# Patient Record
Sex: FEMALE | Race: WHITE | NOT HISPANIC OR LATINO
[De-identification: names, ages, dates, MRNs, and addresses within clinical notes are randomized per-mention and may not be internally consistent; named-entity substitution may affect disease eponyms.]

---

## 2017-01-25 ENCOUNTER — APPOINTMENT (OUTPATIENT)
Dept: PAIN MANAGEMENT | Facility: CLINIC | Age: 32
End: 2017-01-25

## 2017-01-25 VITALS
SYSTOLIC BLOOD PRESSURE: 108 MMHG | DIASTOLIC BLOOD PRESSURE: 60 MMHG | HEIGHT: 60 IN | WEIGHT: 155 LBS | HEART RATE: 73 BPM | BODY MASS INDEX: 30.43 KG/M2

## 2017-05-04 ENCOUNTER — APPOINTMENT (OUTPATIENT)
Dept: PAIN MANAGEMENT | Facility: CLINIC | Age: 32
End: 2017-05-04

## 2017-05-04 VITALS
HEART RATE: 78 BPM | WEIGHT: 155 LBS | BODY MASS INDEX: 30.43 KG/M2 | HEIGHT: 60 IN | SYSTOLIC BLOOD PRESSURE: 116 MMHG | DIASTOLIC BLOOD PRESSURE: 77 MMHG

## 2017-06-19 ENCOUNTER — APPOINTMENT (OUTPATIENT)
Dept: PAIN MANAGEMENT | Facility: CLINIC | Age: 32
End: 2017-06-19

## 2017-08-10 ENCOUNTER — APPOINTMENT (OUTPATIENT)
Dept: PAIN MANAGEMENT | Facility: CLINIC | Age: 32
End: 2017-08-10
Payer: COMMERCIAL

## 2017-08-10 VITALS
DIASTOLIC BLOOD PRESSURE: 71 MMHG | HEIGHT: 60 IN | WEIGHT: 146 LBS | SYSTOLIC BLOOD PRESSURE: 112 MMHG | HEART RATE: 73 BPM | BODY MASS INDEX: 28.66 KG/M2

## 2017-08-10 PROCEDURE — 99214 OFFICE O/P EST MOD 30 MIN: CPT

## 2017-08-16 ENCOUNTER — MESSAGE (OUTPATIENT)
Age: 32
End: 2017-08-16

## 2017-09-14 ENCOUNTER — APPOINTMENT (OUTPATIENT)
Dept: PAIN MANAGEMENT | Facility: CLINIC | Age: 32
End: 2017-09-14

## 2017-11-09 ENCOUNTER — APPOINTMENT (OUTPATIENT)
Dept: PAIN MANAGEMENT | Facility: CLINIC | Age: 32
End: 2017-11-09

## 2018-02-07 ENCOUNTER — MEDICATION RENEWAL (OUTPATIENT)
Age: 33
End: 2018-02-07

## 2018-02-12 ENCOUNTER — APPOINTMENT (OUTPATIENT)
Dept: PAIN MANAGEMENT | Facility: CLINIC | Age: 33
End: 2018-02-12

## 2018-02-14 ENCOUNTER — MEDICATION RENEWAL (OUTPATIENT)
Age: 33
End: 2018-02-14

## 2018-04-04 ENCOUNTER — MESSAGE (OUTPATIENT)
Age: 33
End: 2018-04-04

## 2018-04-09 ENCOUNTER — MESSAGE (OUTPATIENT)
Age: 33
End: 2018-04-09

## 2018-05-23 ENCOUNTER — APPOINTMENT (OUTPATIENT)
Dept: PAIN MANAGEMENT | Facility: CLINIC | Age: 33
End: 2018-05-23
Payer: COMMERCIAL

## 2018-05-23 VITALS
DIASTOLIC BLOOD PRESSURE: 75 MMHG | SYSTOLIC BLOOD PRESSURE: 118 MMHG | BODY MASS INDEX: 27.88 KG/M2 | WEIGHT: 142 LBS | HEART RATE: 77 BPM | HEIGHT: 60 IN

## 2018-05-23 PROCEDURE — 99213 OFFICE O/P EST LOW 20 MIN: CPT

## 2018-06-11 ENCOUNTER — MESSAGE (OUTPATIENT)
Age: 33
End: 2018-06-11

## 2018-11-13 ENCOUNTER — TRANSCRIPTION ENCOUNTER (OUTPATIENT)
Age: 33
End: 2018-11-13

## 2018-11-19 ENCOUNTER — APPOINTMENT (OUTPATIENT)
Dept: PAIN MANAGEMENT | Facility: CLINIC | Age: 33
End: 2018-11-19

## 2018-12-21 ENCOUNTER — TRANSCRIPTION ENCOUNTER (OUTPATIENT)
Age: 33
End: 2018-12-21

## 2019-01-03 ENCOUNTER — APPOINTMENT (OUTPATIENT)
Dept: PAIN MANAGEMENT | Facility: CLINIC | Age: 34
End: 2019-01-03
Payer: COMMERCIAL

## 2019-01-03 VITALS
WEIGHT: 130 LBS | SYSTOLIC BLOOD PRESSURE: 123 MMHG | HEART RATE: 64 BPM | HEIGHT: 60 IN | BODY MASS INDEX: 25.52 KG/M2 | DIASTOLIC BLOOD PRESSURE: 79 MMHG

## 2019-01-03 PROCEDURE — 99214 OFFICE O/P EST MOD 30 MIN: CPT

## 2019-04-10 ENCOUNTER — APPOINTMENT (OUTPATIENT)
Dept: PAIN MANAGEMENT | Facility: CLINIC | Age: 34
End: 2019-04-10

## 2019-10-28 ENCOUNTER — RECORD ABSTRACTING (OUTPATIENT)
Age: 34
End: 2019-10-28

## 2019-10-28 DIAGNOSIS — O09.292 SUPERVISION OF PREGNANCY WITH OTHER POOR REPRODUCTIVE OR OBSTETRIC HISTORY, SECOND TRIMESTER: ICD-10-CM

## 2019-10-28 DIAGNOSIS — O09.299 SUPERVISION OF PREGNANCY WITH OTHER POOR REPRODUCTIVE OR OBSTETRIC HISTORY, UNSPECIFIED TRIMESTER: ICD-10-CM

## 2019-10-28 DIAGNOSIS — Z31.69 ENCOUNTER FOR OTHER GENERAL COUNSELING AND ADVICE ON PROCREATION: ICD-10-CM

## 2019-11-07 ENCOUNTER — APPOINTMENT (OUTPATIENT)
Dept: MATERNAL FETAL MEDICINE | Facility: CLINIC | Age: 34
End: 2019-11-07

## 2019-11-07 ENCOUNTER — APPOINTMENT (OUTPATIENT)
Dept: ANTEPARTUM | Facility: CLINIC | Age: 34
End: 2019-11-07

## 2019-11-07 ENCOUNTER — APPOINTMENT (OUTPATIENT)
Dept: OBGYN | Facility: CLINIC | Age: 34
End: 2019-11-07
Payer: COMMERCIAL

## 2019-11-07 PROCEDURE — 99203 OFFICE O/P NEW LOW 30 MIN: CPT

## 2019-12-08 ENCOUNTER — FORM ENCOUNTER (OUTPATIENT)
Age: 34
End: 2019-12-08

## 2019-12-09 ENCOUNTER — RESULT REVIEW (OUTPATIENT)
Age: 34
End: 2019-12-09

## 2019-12-09 ENCOUNTER — APPOINTMENT (OUTPATIENT)
Dept: OBGYN | Facility: CLINIC | Age: 34
End: 2019-12-09
Payer: COMMERCIAL

## 2019-12-09 PROCEDURE — 99395 PREV VISIT EST AGE 18-39: CPT

## 2020-01-03 ENCOUNTER — FORM ENCOUNTER (OUTPATIENT)
Age: 35
End: 2020-01-03

## 2020-01-13 ENCOUNTER — APPOINTMENT (OUTPATIENT)
Dept: OBGYN | Facility: CLINIC | Age: 35
End: 2020-01-13
Payer: COMMERCIAL

## 2020-01-13 PROCEDURE — 99214 OFFICE O/P EST MOD 30 MIN: CPT | Mod: 25

## 2020-01-13 PROCEDURE — 76817 TRANSVAGINAL US OBSTETRIC: CPT

## 2020-01-13 PROCEDURE — 36415 COLL VENOUS BLD VENIPUNCTURE: CPT

## 2020-01-29 ENCOUNTER — FORM ENCOUNTER (OUTPATIENT)
Age: 35
End: 2020-01-29

## 2020-01-30 ENCOUNTER — APPOINTMENT (OUTPATIENT)
Dept: OBGYN | Facility: CLINIC | Age: 35
End: 2020-01-30
Payer: COMMERCIAL

## 2020-01-30 PROCEDURE — 76817 TRANSVAGINAL US OBSTETRIC: CPT

## 2020-02-02 ENCOUNTER — FORM ENCOUNTER (OUTPATIENT)
Age: 35
End: 2020-02-02

## 2020-02-12 ENCOUNTER — FORM ENCOUNTER (OUTPATIENT)
Age: 35
End: 2020-02-12

## 2020-02-13 ENCOUNTER — APPOINTMENT (OUTPATIENT)
Dept: OBGYN | Facility: CLINIC | Age: 35
End: 2020-02-13
Payer: COMMERCIAL

## 2020-02-13 PROCEDURE — 0502F SUBSEQUENT PRENATAL CARE: CPT

## 2020-02-13 PROCEDURE — 36415 COLL VENOUS BLD VENIPUNCTURE: CPT

## 2020-02-13 PROCEDURE — 76801 OB US < 14 WKS SINGLE FETUS: CPT | Mod: 59

## 2020-02-13 PROCEDURE — 76813 OB US NUCHAL MEAS 1 GEST: CPT

## 2020-02-21 ENCOUNTER — FORM ENCOUNTER (OUTPATIENT)
Age: 35
End: 2020-02-21

## 2020-02-24 ENCOUNTER — FORM ENCOUNTER (OUTPATIENT)
Age: 35
End: 2020-02-24

## 2020-02-25 ENCOUNTER — FORM ENCOUNTER (OUTPATIENT)
Age: 35
End: 2020-02-25

## 2020-02-26 ENCOUNTER — FORM ENCOUNTER (OUTPATIENT)
Age: 35
End: 2020-02-26

## 2020-03-02 ENCOUNTER — APPOINTMENT (OUTPATIENT)
Dept: OBGYN | Facility: CLINIC | Age: 35
End: 2020-03-02
Payer: COMMERCIAL

## 2020-03-02 PROCEDURE — 0502F SUBSEQUENT PRENATAL CARE: CPT

## 2020-03-05 ENCOUNTER — TRANSCRIPTION ENCOUNTER (OUTPATIENT)
Age: 35
End: 2020-03-05

## 2020-03-06 ENCOUNTER — OUTPATIENT (OUTPATIENT)
Dept: OUTPATIENT SERVICES | Facility: HOSPITAL | Age: 35
LOS: 1 days | Discharge: HOME | End: 2020-03-06
Payer: COMMERCIAL

## 2020-03-06 VITALS — HEIGHT: 60 IN | WEIGHT: 147.27 LBS

## 2020-03-06 VITALS
RESPIRATION RATE: 15 BRPM | DIASTOLIC BLOOD PRESSURE: 70 MMHG | TEMPERATURE: 99 F | OXYGEN SATURATION: 98 % | HEART RATE: 70 BPM | SYSTOLIC BLOOD PRESSURE: 116 MMHG

## 2020-03-06 DIAGNOSIS — O34.30 MATERNAL CARE FOR CERVICAL INCOMPETENCE, UNSPECIFIED TRIMESTER: ICD-10-CM

## 2020-03-06 LAB
BASOPHILS # BLD AUTO: 0.02 K/UL — SIGNIFICANT CHANGE UP (ref 0–0.2)
BASOPHILS NFR BLD AUTO: 0.3 % — SIGNIFICANT CHANGE UP (ref 0–2)
BLD GP AB SCN SERPL QL: NEGATIVE — SIGNIFICANT CHANGE UP
EOSINOPHIL # BLD AUTO: 0.16 K/UL — SIGNIFICANT CHANGE UP (ref 0–0.5)
EOSINOPHIL NFR BLD AUTO: 2.5 % — SIGNIFICANT CHANGE UP (ref 0–6)
HCT VFR BLD CALC: 31.7 % — LOW (ref 34.5–45)
HGB BLD-MCNC: 10.7 G/DL — LOW (ref 11.5–15.5)
IMM GRANULOCYTES NFR BLD AUTO: 0.6 % — SIGNIFICANT CHANGE UP (ref 0–1.5)
LYMPHOCYTES # BLD AUTO: 1.76 K/UL — SIGNIFICANT CHANGE UP (ref 1–3.3)
LYMPHOCYTES # BLD AUTO: 27.1 % — SIGNIFICANT CHANGE UP (ref 13–44)
MCHC RBC-ENTMCNC: 31.9 PG — SIGNIFICANT CHANGE UP (ref 27–34)
MCHC RBC-ENTMCNC: 33.8 GM/DL — SIGNIFICANT CHANGE UP (ref 32–36)
MCV RBC AUTO: 94.6 FL — SIGNIFICANT CHANGE UP (ref 80–100)
MONOCYTES # BLD AUTO: 0.45 K/UL — SIGNIFICANT CHANGE UP (ref 0–0.9)
MONOCYTES NFR BLD AUTO: 6.9 % — SIGNIFICANT CHANGE UP (ref 2–14)
NEUTROPHILS # BLD AUTO: 4.06 K/UL — SIGNIFICANT CHANGE UP (ref 1.8–7.4)
NEUTROPHILS NFR BLD AUTO: 62.6 % — SIGNIFICANT CHANGE UP (ref 43–77)
NRBC # BLD: 0 /100 WBCS — SIGNIFICANT CHANGE UP (ref 0–0)
PLATELET # BLD AUTO: 255 K/UL — SIGNIFICANT CHANGE UP (ref 150–400)
RBC # BLD: 3.35 M/UL — LOW (ref 3.8–5.2)
RBC # FLD: 14.1 % — SIGNIFICANT CHANGE UP (ref 10.3–14.5)
RH IG SCN BLD-IMP: POSITIVE — SIGNIFICANT CHANGE UP
WBC # BLD: 6.49 K/UL — SIGNIFICANT CHANGE UP (ref 3.8–10.5)
WBC # FLD AUTO: 6.49 K/UL — SIGNIFICANT CHANGE UP (ref 3.8–10.5)

## 2020-03-06 PROCEDURE — 86900 BLOOD TYPING SEROLOGIC ABO: CPT

## 2020-03-06 PROCEDURE — 85027 COMPLETE CBC AUTOMATED: CPT

## 2020-03-06 PROCEDURE — 59320 REVISION OF CERVIX: CPT

## 2020-03-06 PROCEDURE — 86901 BLOOD TYPING SEROLOGIC RH(D): CPT

## 2020-03-06 PROCEDURE — 59025 FETAL NON-STRESS TEST: CPT

## 2020-03-06 PROCEDURE — 86850 RBC ANTIBODY SCREEN: CPT

## 2020-03-06 RX ORDER — SODIUM CHLORIDE 9 MG/ML
1000 INJECTION, SOLUTION INTRAVENOUS
Refills: 0 | Status: DISCONTINUED | OUTPATIENT
Start: 2020-03-06 | End: 2020-03-06

## 2020-03-06 RX ORDER — INDOMETHACIN 50 MG
1 CAPSULE ORAL
Qty: 8 | Refills: 0
Start: 2020-03-06 | End: 2020-03-07

## 2020-03-06 RX ORDER — CITRIC ACID/SODIUM CITRATE 300-500 MG
15 SOLUTION, ORAL ORAL EVERY 6 HOURS
Refills: 0 | Status: DISCONTINUED | OUTPATIENT
Start: 2020-03-06 | End: 2020-03-06

## 2020-03-06 RX ORDER — INDOMETHACIN 50 MG
50 CAPSULE ORAL ONCE
Refills: 0 | Status: COMPLETED | OUTPATIENT
Start: 2020-03-06 | End: 2020-03-06

## 2020-03-06 RX ADMIN — Medication 50 MILLIGRAM(S): at 15:59

## 2020-03-09 ENCOUNTER — FORM ENCOUNTER (OUTPATIENT)
Age: 35
End: 2020-03-09

## 2020-03-12 ENCOUNTER — APPOINTMENT (OUTPATIENT)
Dept: OBGYN | Facility: CLINIC | Age: 35
End: 2020-03-12
Payer: COMMERCIAL

## 2020-03-12 PROCEDURE — 76815 OB US LIMITED FETUS(S): CPT | Mod: 59

## 2020-03-12 PROCEDURE — 36415 COLL VENOUS BLD VENIPUNCTURE: CPT

## 2020-03-12 PROCEDURE — 0502F SUBSEQUENT PRENATAL CARE: CPT

## 2020-03-12 PROCEDURE — 76817 TRANSVAGINAL US OBSTETRIC: CPT

## 2020-03-19 ENCOUNTER — APPOINTMENT (OUTPATIENT)
Dept: OBGYN | Facility: CLINIC | Age: 35
End: 2020-03-19
Payer: COMMERCIAL

## 2020-03-19 PROCEDURE — 0502F SUBSEQUENT PRENATAL CARE: CPT

## 2020-03-19 PROCEDURE — 96372 THER/PROPH/DIAG INJ SC/IM: CPT

## 2020-03-19 PROCEDURE — 76817 TRANSVAGINAL US OBSTETRIC: CPT

## 2020-03-19 PROCEDURE — 36415 COLL VENOUS BLD VENIPUNCTURE: CPT

## 2020-03-19 PROCEDURE — 76815 OB US LIMITED FETUS(S): CPT

## 2020-03-26 ENCOUNTER — APPOINTMENT (OUTPATIENT)
Dept: OBGYN | Facility: CLINIC | Age: 35
End: 2020-03-26
Payer: COMMERCIAL

## 2020-03-26 PROCEDURE — 96372 THER/PROPH/DIAG INJ SC/IM: CPT

## 2020-03-30 ENCOUNTER — APPOINTMENT (OUTPATIENT)
Dept: ANTEPARTUM | Facility: CLINIC | Age: 35
End: 2020-03-30
Payer: COMMERCIAL

## 2020-03-30 ENCOUNTER — ASOB RESULT (OUTPATIENT)
Age: 35
End: 2020-03-30

## 2020-03-30 PROCEDURE — 76817 TRANSVAGINAL US OBSTETRIC: CPT

## 2020-03-30 PROCEDURE — 76805 OB US >/= 14 WKS SNGL FETUS: CPT

## 2020-03-31 ENCOUNTER — ASOB RESULT (OUTPATIENT)
Age: 35
End: 2020-03-31

## 2020-04-02 ENCOUNTER — APPOINTMENT (OUTPATIENT)
Dept: OBGYN | Facility: CLINIC | Age: 35
End: 2020-04-02

## 2020-04-08 ENCOUNTER — APPOINTMENT (OUTPATIENT)
Dept: OBGYN | Facility: CLINIC | Age: 35
End: 2020-04-08
Payer: COMMERCIAL

## 2020-04-08 PROCEDURE — 96372 THER/PROPH/DIAG INJ SC/IM: CPT

## 2020-04-16 ENCOUNTER — APPOINTMENT (OUTPATIENT)
Dept: OBGYN | Facility: CLINIC | Age: 35
End: 2020-04-16

## 2020-04-17 ENCOUNTER — APPOINTMENT (OUTPATIENT)
Dept: OBGYN | Facility: CLINIC | Age: 35
End: 2020-04-17
Payer: COMMERCIAL

## 2020-04-17 PROCEDURE — 96372 THER/PROPH/DIAG INJ SC/IM: CPT

## 2020-04-17 PROCEDURE — 36415 COLL VENOUS BLD VENIPUNCTURE: CPT

## 2020-04-20 ENCOUNTER — ASOB RESULT (OUTPATIENT)
Age: 35
End: 2020-04-20

## 2020-04-20 ENCOUNTER — APPOINTMENT (OUTPATIENT)
Dept: ANTEPARTUM | Facility: CLINIC | Age: 35
End: 2020-04-20
Payer: COMMERCIAL

## 2020-04-20 PROCEDURE — 76811 OB US DETAILED SNGL FETUS: CPT

## 2020-04-23 ENCOUNTER — APPOINTMENT (OUTPATIENT)
Dept: OBGYN | Facility: CLINIC | Age: 35
End: 2020-04-23
Payer: COMMERCIAL

## 2020-04-23 PROCEDURE — 96372 THER/PROPH/DIAG INJ SC/IM: CPT

## 2020-04-30 ENCOUNTER — APPOINTMENT (OUTPATIENT)
Dept: OBGYN | Facility: CLINIC | Age: 35
End: 2020-04-30
Payer: COMMERCIAL

## 2020-04-30 PROCEDURE — 96372 THER/PROPH/DIAG INJ SC/IM: CPT

## 2020-05-04 ENCOUNTER — APPOINTMENT (OUTPATIENT)
Dept: OBGYN | Facility: CLINIC | Age: 35
End: 2020-05-04
Payer: COMMERCIAL

## 2020-05-04 PROCEDURE — 0502F SUBSEQUENT PRENATAL CARE: CPT

## 2020-05-07 ENCOUNTER — APPOINTMENT (OUTPATIENT)
Dept: OBGYN | Facility: CLINIC | Age: 35
End: 2020-05-07
Payer: COMMERCIAL

## 2020-05-07 PROCEDURE — 96372 THER/PROPH/DIAG INJ SC/IM: CPT

## 2020-05-14 ENCOUNTER — APPOINTMENT (OUTPATIENT)
Dept: OBGYN | Facility: CLINIC | Age: 35
End: 2020-05-14
Payer: COMMERCIAL

## 2020-05-14 PROCEDURE — 96372 THER/PROPH/DIAG INJ SC/IM: CPT

## 2020-05-14 PROCEDURE — 76816 OB US FOLLOW-UP PER FETUS: CPT

## 2020-05-14 PROCEDURE — 0502F SUBSEQUENT PRENATAL CARE: CPT

## 2020-05-14 PROCEDURE — 76817 TRANSVAGINAL US OBSTETRIC: CPT

## 2020-05-21 ENCOUNTER — APPOINTMENT (OUTPATIENT)
Dept: OBGYN | Facility: CLINIC | Age: 35
End: 2020-05-21
Payer: COMMERCIAL

## 2020-05-21 PROCEDURE — 96372 THER/PROPH/DIAG INJ SC/IM: CPT

## 2020-05-28 ENCOUNTER — FORM ENCOUNTER (OUTPATIENT)
Age: 35
End: 2020-05-28

## 2020-05-28 ENCOUNTER — APPOINTMENT (OUTPATIENT)
Dept: OBGYN | Facility: CLINIC | Age: 35
End: 2020-05-28
Payer: COMMERCIAL

## 2020-05-28 PROCEDURE — 36415 COLL VENOUS BLD VENIPUNCTURE: CPT

## 2020-05-28 PROCEDURE — 0502F SUBSEQUENT PRENATAL CARE: CPT

## 2020-05-28 PROCEDURE — 96372 THER/PROPH/DIAG INJ SC/IM: CPT

## 2020-06-04 ENCOUNTER — APPOINTMENT (OUTPATIENT)
Dept: OBGYN | Facility: CLINIC | Age: 35
End: 2020-06-04
Payer: COMMERCIAL

## 2020-06-04 PROCEDURE — 96372 THER/PROPH/DIAG INJ SC/IM: CPT

## 2020-06-11 ENCOUNTER — APPOINTMENT (OUTPATIENT)
Dept: OBGYN | Facility: CLINIC | Age: 35
End: 2020-06-11
Payer: COMMERCIAL

## 2020-06-11 PROCEDURE — 0502F SUBSEQUENT PRENATAL CARE: CPT

## 2020-06-11 PROCEDURE — 90471 IMMUNIZATION ADMIN: CPT

## 2020-06-11 PROCEDURE — 96372 THER/PROPH/DIAG INJ SC/IM: CPT

## 2020-06-11 PROCEDURE — 90715 TDAP VACCINE 7 YRS/> IM: CPT

## 2020-06-18 ENCOUNTER — APPOINTMENT (OUTPATIENT)
Dept: OBGYN | Facility: CLINIC | Age: 35
End: 2020-06-18
Payer: COMMERCIAL

## 2020-06-18 PROCEDURE — 36415 COLL VENOUS BLD VENIPUNCTURE: CPT

## 2020-06-18 PROCEDURE — 96372 THER/PROPH/DIAG INJ SC/IM: CPT

## 2020-06-24 ENCOUNTER — APPOINTMENT (OUTPATIENT)
Dept: OBGYN | Facility: CLINIC | Age: 35
End: 2020-06-24
Payer: COMMERCIAL

## 2020-06-24 PROCEDURE — 76819 FETAL BIOPHYS PROFIL W/O NST: CPT

## 2020-06-24 PROCEDURE — 0502F SUBSEQUENT PRENATAL CARE: CPT

## 2020-06-24 PROCEDURE — 76816 OB US FOLLOW-UP PER FETUS: CPT

## 2020-06-24 PROCEDURE — 96372 THER/PROPH/DIAG INJ SC/IM: CPT

## 2020-07-02 ENCOUNTER — APPOINTMENT (OUTPATIENT)
Dept: OBGYN | Facility: CLINIC | Age: 35
End: 2020-07-02
Payer: COMMERCIAL

## 2020-07-02 PROCEDURE — 96372 THER/PROPH/DIAG INJ SC/IM: CPT

## 2020-07-09 ENCOUNTER — APPOINTMENT (OUTPATIENT)
Dept: OBGYN | Facility: CLINIC | Age: 35
End: 2020-07-09
Payer: COMMERCIAL

## 2020-07-09 PROCEDURE — 0502F SUBSEQUENT PRENATAL CARE: CPT

## 2020-07-09 PROCEDURE — 96372 THER/PROPH/DIAG INJ SC/IM: CPT

## 2020-07-16 ENCOUNTER — APPOINTMENT (OUTPATIENT)
Dept: OBGYN | Facility: CLINIC | Age: 35
End: 2020-07-16
Payer: COMMERCIAL

## 2020-07-16 PROCEDURE — 96372 THER/PROPH/DIAG INJ SC/IM: CPT

## 2020-07-19 ENCOUNTER — OUTPATIENT (OUTPATIENT)
Dept: OUTPATIENT SERVICES | Facility: HOSPITAL | Age: 35
LOS: 1 days | End: 2020-07-19
Payer: COMMERCIAL

## 2020-07-19 DIAGNOSIS — O26.899 OTHER SPECIFIED PREGNANCY RELATED CONDITIONS, UNSPECIFIED TRIMESTER: ICD-10-CM

## 2020-07-19 DIAGNOSIS — Z3A.00 WEEKS OF GESTATION OF PREGNANCY NOT SPECIFIED: ICD-10-CM

## 2020-07-19 LAB
APPEARANCE UR: ABNORMAL
BILIRUB UR-MCNC: NEGATIVE — SIGNIFICANT CHANGE UP
COLOR SPEC: YELLOW — SIGNIFICANT CHANGE UP
DIFF PNL FLD: NEGATIVE — SIGNIFICANT CHANGE UP
GLUCOSE UR QL: NEGATIVE — SIGNIFICANT CHANGE UP
KETONES UR-MCNC: NEGATIVE — SIGNIFICANT CHANGE UP
LEUKOCYTE ESTERASE UR-ACNC: ABNORMAL
NITRITE UR-MCNC: NEGATIVE — SIGNIFICANT CHANGE UP
PH UR: 6.5 — SIGNIFICANT CHANGE UP (ref 5–8)
PROT UR-MCNC: ABNORMAL
SP GR SPEC: 1.02 — SIGNIFICANT CHANGE UP (ref 1.01–1.02)
UROBILINOGEN FLD QL: NEGATIVE — SIGNIFICANT CHANGE UP

## 2020-07-19 PROCEDURE — G0463: CPT

## 2020-07-19 PROCEDURE — 59025 FETAL NON-STRESS TEST: CPT

## 2020-07-19 PROCEDURE — 87086 URINE CULTURE/COLONY COUNT: CPT

## 2020-07-19 PROCEDURE — 59025 FETAL NON-STRESS TEST: CPT | Mod: 26

## 2020-07-19 PROCEDURE — 81001 URINALYSIS AUTO W/SCOPE: CPT

## 2020-07-19 RX ORDER — NITROFURANTOIN MACROCRYSTAL 50 MG
1 CAPSULE ORAL
Qty: 10 | Refills: 0
Start: 2020-07-19 | End: 2020-07-23

## 2020-07-21 ENCOUNTER — APPOINTMENT (OUTPATIENT)
Dept: OBGYN | Facility: CLINIC | Age: 35
End: 2020-07-21
Payer: COMMERCIAL

## 2020-07-21 LAB
CULTURE RESULTS: SIGNIFICANT CHANGE UP
SPECIMEN SOURCE: SIGNIFICANT CHANGE UP

## 2020-07-21 PROCEDURE — 76816 OB US FOLLOW-UP PER FETUS: CPT

## 2020-07-21 PROCEDURE — 0502F SUBSEQUENT PRENATAL CARE: CPT

## 2020-07-23 ENCOUNTER — APPOINTMENT (OUTPATIENT)
Dept: OBGYN | Facility: CLINIC | Age: 35
End: 2020-07-23
Payer: COMMERCIAL

## 2020-07-23 ENCOUNTER — FORM ENCOUNTER (OUTPATIENT)
Age: 35
End: 2020-07-23

## 2020-07-23 PROCEDURE — 96372 THER/PROPH/DIAG INJ SC/IM: CPT

## 2020-07-30 ENCOUNTER — APPOINTMENT (OUTPATIENT)
Dept: OBGYN | Facility: CLINIC | Age: 35
End: 2020-07-30
Payer: COMMERCIAL

## 2020-07-30 PROCEDURE — 96372 THER/PROPH/DIAG INJ SC/IM: CPT

## 2020-08-03 ENCOUNTER — APPOINTMENT (OUTPATIENT)
Dept: OBGYN | Facility: CLINIC | Age: 35
End: 2020-08-03
Payer: COMMERCIAL

## 2020-08-03 PROCEDURE — 96372 THER/PROPH/DIAG INJ SC/IM: CPT

## 2020-08-03 PROCEDURE — 0502F SUBSEQUENT PRENATAL CARE: CPT

## 2020-08-03 PROCEDURE — 36415 COLL VENOUS BLD VENIPUNCTURE: CPT

## 2020-08-06 ENCOUNTER — APPOINTMENT (OUTPATIENT)
Dept: OBGYN | Facility: CLINIC | Age: 35
End: 2020-08-06

## 2020-08-10 ENCOUNTER — APPOINTMENT (OUTPATIENT)
Dept: OBGYN | Facility: CLINIC | Age: 35
End: 2020-08-10
Payer: COMMERCIAL

## 2020-08-10 PROCEDURE — 0502F SUBSEQUENT PRENATAL CARE: CPT

## 2020-08-17 ENCOUNTER — APPOINTMENT (OUTPATIENT)
Dept: OBGYN | Facility: CLINIC | Age: 35
End: 2020-08-17
Payer: COMMERCIAL

## 2020-08-17 PROCEDURE — 0502F SUBSEQUENT PRENATAL CARE: CPT

## 2020-08-19 ENCOUNTER — APPOINTMENT (OUTPATIENT)
Dept: OBGYN | Facility: CLINIC | Age: 35
End: 2020-08-19
Payer: COMMERCIAL

## 2020-08-19 PROCEDURE — 99211 OFF/OP EST MAY X REQ PHY/QHP: CPT

## 2020-08-20 ENCOUNTER — FORM ENCOUNTER (OUTPATIENT)
Age: 35
End: 2020-08-20

## 2020-08-21 ENCOUNTER — APPOINTMENT (OUTPATIENT)
Dept: OBGYN | Facility: CLINIC | Age: 35
End: 2020-08-21
Payer: COMMERCIAL

## 2020-08-21 PROCEDURE — 99211 OFF/OP EST MAY X REQ PHY/QHP: CPT

## 2020-08-23 ENCOUNTER — FORM ENCOUNTER (OUTPATIENT)
Age: 35
End: 2020-08-23

## 2020-08-24 ENCOUNTER — APPOINTMENT (OUTPATIENT)
Dept: OBGYN | Facility: CLINIC | Age: 35
End: 2020-08-24
Payer: COMMERCIAL

## 2020-08-24 ENCOUNTER — TRANSCRIPTION ENCOUNTER (OUTPATIENT)
Age: 35
End: 2020-08-24

## 2020-08-24 PROCEDURE — 0502F SUBSEQUENT PRENATAL CARE: CPT

## 2020-08-25 ENCOUNTER — INPATIENT (INPATIENT)
Facility: HOSPITAL | Age: 35
LOS: 1 days | Discharge: HOME | End: 2020-08-27
Attending: OBSTETRICS & GYNECOLOGY | Admitting: OBSTETRICS & GYNECOLOGY
Payer: COMMERCIAL

## 2020-08-25 VITALS — HEIGHT: 60 IN | WEIGHT: 197.09 LBS

## 2020-08-25 DIAGNOSIS — O34.219 MATERNAL CARE FOR UNSPECIFIED TYPE SCAR FROM PREVIOUS CESAREAN DELIVERY: ICD-10-CM

## 2020-08-25 LAB
APTT BLD: 24 SEC — LOW (ref 27.5–35.5)
APTT BLD: 24.4 SEC — LOW (ref 27.5–35.5)
BASOPHILS # BLD AUTO: 0.03 K/UL — SIGNIFICANT CHANGE UP (ref 0–0.2)
BASOPHILS # BLD AUTO: 0.03 K/UL — SIGNIFICANT CHANGE UP (ref 0–0.2)
BASOPHILS NFR BLD AUTO: 0.3 % — SIGNIFICANT CHANGE UP (ref 0–2)
BASOPHILS NFR BLD AUTO: 0.5 % — SIGNIFICANT CHANGE UP (ref 0–2)
BLD GP AB SCN SERPL QL: NEGATIVE — SIGNIFICANT CHANGE UP
EOSINOPHIL # BLD AUTO: 0.06 K/UL — SIGNIFICANT CHANGE UP (ref 0–0.5)
EOSINOPHIL # BLD AUTO: 0.2 K/UL — SIGNIFICANT CHANGE UP (ref 0–0.5)
EOSINOPHIL NFR BLD AUTO: 0.6 % — SIGNIFICANT CHANGE UP (ref 0–6)
EOSINOPHIL NFR BLD AUTO: 3 % — SIGNIFICANT CHANGE UP (ref 0–6)
FIBRINOGEN PPP-MCNC: 462 MG/DL — SIGNIFICANT CHANGE UP (ref 350–510)
FIBRINOGEN PPP-MCNC: 480 MG/DL — SIGNIFICANT CHANGE UP (ref 350–510)
HCT VFR BLD CALC: 25.5 % — LOW (ref 34.5–45)
HCT VFR BLD CALC: 26.4 % — LOW (ref 34.5–45)
HCT VFR BLD CALC: 30.6 % — LOW (ref 34.5–45)
HCT VFR BLD CALC: 34.4 % — LOW (ref 34.5–45)
HGB BLD-MCNC: 11.4 G/DL — LOW (ref 11.5–15.5)
HGB BLD-MCNC: 8.4 G/DL — LOW (ref 11.5–15.5)
HGB BLD-MCNC: 8.7 G/DL — LOW (ref 11.5–15.5)
HGB BLD-MCNC: 9.9 G/DL — LOW (ref 11.5–15.5)
IMM GRANULOCYTES NFR BLD AUTO: 0.5 % — SIGNIFICANT CHANGE UP (ref 0–1.5)
IMM GRANULOCYTES NFR BLD AUTO: 1.4 % — SIGNIFICANT CHANGE UP (ref 0–1.5)
INR BLD: 0.99 RATIO — SIGNIFICANT CHANGE UP (ref 0.88–1.16)
INR BLD: 1.01 RATIO — SIGNIFICANT CHANGE UP (ref 0.88–1.16)
LYMPHOCYTES # BLD AUTO: 1.15 K/UL — SIGNIFICANT CHANGE UP (ref 1–3.3)
LYMPHOCYTES # BLD AUTO: 1.19 K/UL — SIGNIFICANT CHANGE UP (ref 1–3.3)
LYMPHOCYTES # BLD AUTO: 12 % — LOW (ref 13–44)
LYMPHOCYTES # BLD AUTO: 18 % — SIGNIFICANT CHANGE UP (ref 13–44)
MCHC RBC-ENTMCNC: 32 PG — SIGNIFICANT CHANGE UP (ref 27–34)
MCHC RBC-ENTMCNC: 32.1 PG — SIGNIFICANT CHANGE UP (ref 27–34)
MCHC RBC-ENTMCNC: 32.3 PG — SIGNIFICANT CHANGE UP (ref 27–34)
MCHC RBC-ENTMCNC: 32.4 GM/DL — SIGNIFICANT CHANGE UP (ref 32–36)
MCHC RBC-ENTMCNC: 32.5 PG — SIGNIFICANT CHANGE UP (ref 27–34)
MCHC RBC-ENTMCNC: 32.9 GM/DL — SIGNIFICANT CHANGE UP (ref 32–36)
MCHC RBC-ENTMCNC: 33 GM/DL — SIGNIFICANT CHANGE UP (ref 32–36)
MCHC RBC-ENTMCNC: 33.1 GM/DL — SIGNIFICANT CHANGE UP (ref 32–36)
MCV RBC AUTO: 96.9 FL — SIGNIFICANT CHANGE UP (ref 80–100)
MCV RBC AUTO: 98.1 FL — SIGNIFICANT CHANGE UP (ref 80–100)
MCV RBC AUTO: 98.5 FL — SIGNIFICANT CHANGE UP (ref 80–100)
MCV RBC AUTO: 99 FL — SIGNIFICANT CHANGE UP (ref 80–100)
MONOCYTES # BLD AUTO: 0.49 K/UL — SIGNIFICANT CHANGE UP (ref 0–0.9)
MONOCYTES # BLD AUTO: 0.69 K/UL — SIGNIFICANT CHANGE UP (ref 0–0.9)
MONOCYTES NFR BLD AUTO: 7.2 % — SIGNIFICANT CHANGE UP (ref 2–14)
MONOCYTES NFR BLD AUTO: 7.4 % — SIGNIFICANT CHANGE UP (ref 2–14)
NEUTROPHILS # BLD AUTO: 4.61 K/UL — SIGNIFICANT CHANGE UP (ref 1.8–7.4)
NEUTROPHILS # BLD AUTO: 7.61 K/UL — HIGH (ref 1.8–7.4)
NEUTROPHILS NFR BLD AUTO: 69.7 % — SIGNIFICANT CHANGE UP (ref 43–77)
NEUTROPHILS NFR BLD AUTO: 79.4 % — HIGH (ref 43–77)
NRBC # BLD: 0 /100 WBCS — SIGNIFICANT CHANGE UP (ref 0–0)
PLATELET # BLD AUTO: 143 K/UL — LOW (ref 150–400)
PLATELET # BLD AUTO: 147 K/UL — LOW (ref 150–400)
PLATELET # BLD AUTO: 166 K/UL — SIGNIFICANT CHANGE UP (ref 150–400)
PLATELET # BLD AUTO: 168 K/UL — SIGNIFICANT CHANGE UP (ref 150–400)
PROTHROM AB SERPL-ACNC: 11.8 SEC — SIGNIFICANT CHANGE UP (ref 10.6–13.6)
PROTHROM AB SERPL-ACNC: 12 SEC — SIGNIFICANT CHANGE UP (ref 10.6–13.6)
RBC # BLD: 2.6 M/UL — LOW (ref 3.8–5.2)
RBC # BLD: 2.68 M/UL — LOW (ref 3.8–5.2)
RBC # BLD: 3.09 M/UL — LOW (ref 3.8–5.2)
RBC # BLD: 3.55 M/UL — LOW (ref 3.8–5.2)
RBC # FLD: 13.7 % — SIGNIFICANT CHANGE UP (ref 10.3–14.5)
RBC # FLD: 13.8 % — SIGNIFICANT CHANGE UP (ref 10.3–14.5)
RBC # FLD: 13.9 % — SIGNIFICANT CHANGE UP (ref 10.3–14.5)
RBC # FLD: 13.9 % — SIGNIFICANT CHANGE UP (ref 10.3–14.5)
RH IG SCN BLD-IMP: POSITIVE — SIGNIFICANT CHANGE UP
SARS-COV-2 IGG SERPL QL IA: NEGATIVE — SIGNIFICANT CHANGE UP
SARS-COV-2 IGM SERPL IA-ACNC: <0.1 INDEX — SIGNIFICANT CHANGE UP
T PALLIDUM AB TITR SER: NEGATIVE — SIGNIFICANT CHANGE UP
WBC # BLD: 10.38 K/UL — SIGNIFICANT CHANGE UP (ref 3.8–10.5)
WBC # BLD: 11.08 K/UL — HIGH (ref 3.8–10.5)
WBC # BLD: 6.61 K/UL — SIGNIFICANT CHANGE UP (ref 3.8–10.5)
WBC # BLD: 9.59 K/UL — SIGNIFICANT CHANGE UP (ref 3.8–10.5)
WBC # FLD AUTO: 10.38 K/UL — SIGNIFICANT CHANGE UP (ref 3.8–10.5)
WBC # FLD AUTO: 11.08 K/UL — HIGH (ref 3.8–10.5)
WBC # FLD AUTO: 6.61 K/UL — SIGNIFICANT CHANGE UP (ref 3.8–10.5)
WBC # FLD AUTO: 9.59 K/UL — SIGNIFICANT CHANGE UP (ref 3.8–10.5)

## 2020-08-25 PROCEDURE — 88307 TISSUE EXAM BY PATHOLOGIST: CPT | Mod: 26

## 2020-08-25 PROCEDURE — 59510 CESAREAN DELIVERY: CPT

## 2020-08-25 RX ORDER — FAMOTIDINE 10 MG/ML
20 INJECTION INTRAVENOUS ONCE
Refills: 0 | Status: COMPLETED | OUTPATIENT
Start: 2020-08-25 | End: 2020-08-25

## 2020-08-25 RX ORDER — CITRIC ACID/SODIUM CITRATE 300-500 MG
30 SOLUTION, ORAL ORAL ONCE
Refills: 0 | Status: COMPLETED | OUTPATIENT
Start: 2020-08-25 | End: 2020-08-25

## 2020-08-25 RX ORDER — ACETAMINOPHEN 500 MG
975 TABLET ORAL
Refills: 0 | Status: DISCONTINUED | OUTPATIENT
Start: 2020-08-25 | End: 2020-08-27

## 2020-08-25 RX ORDER — METOCLOPRAMIDE HCL 10 MG
10 TABLET ORAL ONCE
Refills: 0 | Status: DISCONTINUED | OUTPATIENT
Start: 2020-08-25 | End: 2020-08-25

## 2020-08-25 RX ORDER — LANOLIN
1 OINTMENT (GRAM) TOPICAL EVERY 6 HOURS
Refills: 0 | Status: DISCONTINUED | OUTPATIENT
Start: 2020-08-25 | End: 2020-08-27

## 2020-08-25 RX ORDER — CARBOPROST TROMETHAMINE 250 UG/ML
250 INJECTION, SOLUTION INTRAMUSCULAR
Refills: 0 | Status: COMPLETED | OUTPATIENT
Start: 2020-08-25 | End: 2020-08-25

## 2020-08-25 RX ORDER — MAGNESIUM HYDROXIDE 400 MG/1
30 TABLET, CHEWABLE ORAL
Refills: 0 | Status: DISCONTINUED | OUTPATIENT
Start: 2020-08-25 | End: 2020-08-27

## 2020-08-25 RX ORDER — TRANEXAMIC ACID 100 MG/ML
1000 INJECTION, SOLUTION INTRAVENOUS ONCE
Refills: 0 | Status: COMPLETED | OUTPATIENT
Start: 2020-08-25 | End: 2020-08-25

## 2020-08-25 RX ORDER — HYDROMORPHONE HYDROCHLORIDE 2 MG/ML
0.25 INJECTION INTRAMUSCULAR; INTRAVENOUS; SUBCUTANEOUS
Refills: 0 | Status: COMPLETED | OUTPATIENT
Start: 2020-08-25 | End: 2020-08-25

## 2020-08-25 RX ORDER — OXYCODONE HYDROCHLORIDE 5 MG/1
5 TABLET ORAL ONCE
Refills: 0 | Status: DISCONTINUED | OUTPATIENT
Start: 2020-08-25 | End: 2020-08-27

## 2020-08-25 RX ORDER — DIPHENOXYLATE HCL/ATROPINE 2.5-.025MG
2 TABLET ORAL ONCE
Refills: 0 | Status: DISCONTINUED | OUTPATIENT
Start: 2020-08-25 | End: 2020-08-25

## 2020-08-25 RX ORDER — OXYCODONE HYDROCHLORIDE 5 MG/1
5 TABLET ORAL
Refills: 0 | Status: COMPLETED | OUTPATIENT
Start: 2020-08-25 | End: 2020-09-01

## 2020-08-25 RX ORDER — SODIUM CHLORIDE 9 MG/ML
1000 INJECTION, SOLUTION INTRAVENOUS ONCE
Refills: 0 | Status: COMPLETED | OUTPATIENT
Start: 2020-08-25 | End: 2020-08-25

## 2020-08-25 RX ORDER — SODIUM CHLORIDE 9 MG/ML
1000 INJECTION, SOLUTION INTRAVENOUS
Refills: 0 | Status: DISCONTINUED | OUTPATIENT
Start: 2020-08-25 | End: 2020-08-25

## 2020-08-25 RX ORDER — OXYCODONE HYDROCHLORIDE 5 MG/1
5 TABLET ORAL
Refills: 0 | Status: DISCONTINUED | OUTPATIENT
Start: 2020-08-25 | End: 2020-08-26

## 2020-08-25 RX ORDER — DIPHENHYDRAMINE HCL 50 MG
25 CAPSULE ORAL EVERY 6 HOURS
Refills: 0 | Status: DISCONTINUED | OUTPATIENT
Start: 2020-08-25 | End: 2020-08-27

## 2020-08-25 RX ORDER — DEXAMETHASONE 0.5 MG/5ML
4 ELIXIR ORAL EVERY 6 HOURS
Refills: 0 | Status: DISCONTINUED | OUTPATIENT
Start: 2020-08-25 | End: 2020-08-26

## 2020-08-25 RX ORDER — OXYTOCIN 10 UNIT/ML
333.33 VIAL (ML) INJECTION
Qty: 20 | Refills: 0 | Status: DISCONTINUED | OUTPATIENT
Start: 2020-08-25 | End: 2020-08-27

## 2020-08-25 RX ORDER — NALOXONE HYDROCHLORIDE 4 MG/.1ML
0.1 SPRAY NASAL
Refills: 0 | Status: DISCONTINUED | OUTPATIENT
Start: 2020-08-25 | End: 2020-08-26

## 2020-08-25 RX ORDER — SODIUM CHLORIDE 9 MG/ML
1000 INJECTION, SOLUTION INTRAVENOUS
Refills: 0 | Status: DISCONTINUED | OUTPATIENT
Start: 2020-08-25 | End: 2020-08-27

## 2020-08-25 RX ORDER — NALBUPHINE HYDROCHLORIDE 10 MG/ML
2.5 INJECTION, SOLUTION INTRAMUSCULAR; INTRAVENOUS; SUBCUTANEOUS EVERY 6 HOURS
Refills: 0 | Status: DISCONTINUED | OUTPATIENT
Start: 2020-08-25 | End: 2020-08-26

## 2020-08-25 RX ORDER — HEPARIN SODIUM 5000 [USP'U]/ML
5000 INJECTION INTRAVENOUS; SUBCUTANEOUS EVERY 12 HOURS
Refills: 0 | Status: DISCONTINUED | OUTPATIENT
Start: 2020-08-25 | End: 2020-08-27

## 2020-08-25 RX ORDER — IBUPROFEN 200 MG
600 TABLET ORAL EVERY 6 HOURS
Refills: 0 | Status: COMPLETED | OUTPATIENT
Start: 2020-08-25 | End: 2021-07-24

## 2020-08-25 RX ORDER — ONDANSETRON 8 MG/1
4 TABLET, FILM COATED ORAL EVERY 6 HOURS
Refills: 0 | Status: DISCONTINUED | OUTPATIENT
Start: 2020-08-25 | End: 2020-08-26

## 2020-08-25 RX ORDER — OXYTOCIN 10 UNIT/ML
5 VIAL (ML) INJECTION ONCE
Refills: 0 | Status: DISCONTINUED | OUTPATIENT
Start: 2020-08-25 | End: 2020-08-27

## 2020-08-25 RX ORDER — TETANUS TOXOID, REDUCED DIPHTHERIA TOXOID AND ACELLULAR PERTUSSIS VACCINE, ADSORBED 5; 2.5; 8; 8; 2.5 [IU]/.5ML; [IU]/.5ML; UG/.5ML; UG/.5ML; UG/.5ML
0.5 SUSPENSION INTRAMUSCULAR ONCE
Refills: 0 | Status: DISCONTINUED | OUTPATIENT
Start: 2020-08-25 | End: 2020-08-27

## 2020-08-25 RX ORDER — MORPHINE SULFATE 50 MG/1
0.1 CAPSULE, EXTENDED RELEASE ORAL ONCE
Refills: 0 | Status: DISCONTINUED | OUTPATIENT
Start: 2020-08-25 | End: 2020-08-26

## 2020-08-25 RX ORDER — SIMETHICONE 80 MG/1
80 TABLET, CHEWABLE ORAL EVERY 4 HOURS
Refills: 0 | Status: DISCONTINUED | OUTPATIENT
Start: 2020-08-25 | End: 2020-08-27

## 2020-08-25 RX ORDER — OXYCODONE HYDROCHLORIDE 5 MG/1
10 TABLET ORAL
Refills: 0 | Status: DISCONTINUED | OUTPATIENT
Start: 2020-08-25 | End: 2020-08-26

## 2020-08-25 RX ADMIN — Medication 2 TABLET(S): at 13:45

## 2020-08-25 RX ADMIN — OXYCODONE HYDROCHLORIDE 5 MILLIGRAM(S): 5 TABLET ORAL at 23:50

## 2020-08-25 RX ADMIN — Medication 1000 MILLIUNIT(S)/MIN: at 11:46

## 2020-08-25 RX ADMIN — Medication 0.2 MILLIGRAM(S): at 11:50

## 2020-08-25 RX ADMIN — CARBOPROST TROMETHAMINE 250 MICROGRAM(S): 250 INJECTION, SOLUTION INTRAMUSCULAR at 11:57

## 2020-08-25 RX ADMIN — TRANEXAMIC ACID 220 MILLIGRAM(S): 100 INJECTION, SOLUTION INTRAVENOUS at 12:04

## 2020-08-25 RX ADMIN — Medication 30 MILLILITER(S): at 09:18

## 2020-08-25 RX ADMIN — SODIUM CHLORIDE 2000 MILLILITER(S): 9 INJECTION, SOLUTION INTRAVENOUS at 09:18

## 2020-08-25 RX ADMIN — HYDROMORPHONE HYDROCHLORIDE 0.25 MILLIGRAM(S): 2 INJECTION INTRAMUSCULAR; INTRAVENOUS; SUBCUTANEOUS at 18:04

## 2020-08-25 RX ADMIN — FAMOTIDINE 20 MILLIGRAM(S): 10 INJECTION INTRAVENOUS at 09:18

## 2020-08-25 RX ADMIN — Medication 975 MILLIGRAM(S): at 20:16

## 2020-08-25 RX ADMIN — CARBOPROST TROMETHAMINE 250 MICROGRAM(S): 250 INJECTION, SOLUTION INTRAMUSCULAR at 12:08

## 2020-08-25 NOTE — PRE-ANESTHESIA EVALUATION ADULT - NSANTHADDINFOFT_GEN_ALL_CORE
CSE explained in detail;  risks include but not limited to HA, failure, nv injury.  All questions answered.

## 2020-08-25 NOTE — PRE-ANESTHESIA EVALUATION ADULT - NSANTHPMHFT_GEN_ALL_CORE
hypothyroidism  migraines (freq. as much as every 2-3 days, describes typical auro w/ B/L hand numbness, oral numbness  Sciatica for several years; unclear as to any prior w/u, denies treatment, describes primarily pain L lower back/buttock/LE  anxiety (no Rx)  s/p primary C/S 2015 (pepper, classical)  s/p elective repeat C/S 2016

## 2020-08-26 LAB
APTT BLD: 26.1 SEC — LOW (ref 27.5–35.5)
FIBRINOGEN PPP-MCNC: 554 MG/DL — HIGH (ref 350–510)
HCT VFR BLD CALC: 22.3 % — LOW (ref 34.5–45)
HCT VFR BLD CALC: 22.9 % — LOW (ref 34.5–45)
HGB BLD-MCNC: 7.3 G/DL — LOW (ref 11.5–15.5)
HGB BLD-MCNC: 7.4 G/DL — LOW (ref 11.5–15.5)
INR BLD: 0.95 RATIO — SIGNIFICANT CHANGE UP (ref 0.88–1.16)
MCHC RBC-ENTMCNC: 32 PG — SIGNIFICANT CHANGE UP (ref 27–34)
MCHC RBC-ENTMCNC: 32.2 PG — SIGNIFICANT CHANGE UP (ref 27–34)
MCHC RBC-ENTMCNC: 32.3 GM/DL — SIGNIFICANT CHANGE UP (ref 32–36)
MCHC RBC-ENTMCNC: 32.7 GM/DL — SIGNIFICANT CHANGE UP (ref 32–36)
MCV RBC AUTO: 98.2 FL — SIGNIFICANT CHANGE UP (ref 80–100)
MCV RBC AUTO: 99.1 FL — SIGNIFICANT CHANGE UP (ref 80–100)
NRBC # BLD: 0 /100 WBCS — SIGNIFICANT CHANGE UP (ref 0–0)
NRBC # BLD: 0 /100 WBCS — SIGNIFICANT CHANGE UP (ref 0–0)
PLATELET # BLD AUTO: 138 K/UL — LOW (ref 150–400)
PLATELET # BLD AUTO: 148 K/UL — LOW (ref 150–400)
PROTHROM AB SERPL-ACNC: 11.3 SEC — SIGNIFICANT CHANGE UP (ref 10.6–13.6)
RBC # BLD: 2.27 M/UL — LOW (ref 3.8–5.2)
RBC # BLD: 2.31 M/UL — LOW (ref 3.8–5.2)
RBC # FLD: 13.9 % — SIGNIFICANT CHANGE UP (ref 10.3–14.5)
RBC # FLD: 14.1 % — SIGNIFICANT CHANGE UP (ref 10.3–14.5)
WBC # BLD: 8.1 K/UL — SIGNIFICANT CHANGE UP (ref 3.8–10.5)
WBC # BLD: 8.5 K/UL — SIGNIFICANT CHANGE UP (ref 3.8–10.5)
WBC # FLD AUTO: 8.1 K/UL — SIGNIFICANT CHANGE UP (ref 3.8–10.5)
WBC # FLD AUTO: 8.5 K/UL — SIGNIFICANT CHANGE UP (ref 3.8–10.5)

## 2020-08-26 RX ORDER — IBUPROFEN 200 MG
600 TABLET ORAL EVERY 6 HOURS
Refills: 0 | Status: DISCONTINUED | OUTPATIENT
Start: 2020-08-26 | End: 2020-08-27

## 2020-08-26 RX ORDER — SENNA PLUS 8.6 MG/1
2 TABLET ORAL AT BEDTIME
Refills: 0 | Status: DISCONTINUED | OUTPATIENT
Start: 2020-08-26 | End: 2020-08-27

## 2020-08-26 RX ORDER — ASCORBIC ACID 60 MG
500 TABLET,CHEWABLE ORAL THREE TIMES A DAY
Refills: 0 | Status: DISCONTINUED | OUTPATIENT
Start: 2020-08-26 | End: 2020-08-27

## 2020-08-26 RX ORDER — OXYCODONE HYDROCHLORIDE 5 MG/1
5 TABLET ORAL
Refills: 0 | Status: DISCONTINUED | OUTPATIENT
Start: 2020-08-26 | End: 2020-08-27

## 2020-08-26 RX ORDER — FERROUS SULFATE 325(65) MG
325 TABLET ORAL THREE TIMES A DAY
Refills: 0 | Status: DISCONTINUED | OUTPATIENT
Start: 2020-08-26 | End: 2020-08-27

## 2020-08-26 RX ADMIN — OXYCODONE HYDROCHLORIDE 5 MILLIGRAM(S): 5 TABLET ORAL at 09:15

## 2020-08-26 RX ADMIN — OXYCODONE HYDROCHLORIDE 5 MILLIGRAM(S): 5 TABLET ORAL at 11:32

## 2020-08-26 RX ADMIN — Medication 975 MILLIGRAM(S): at 20:38

## 2020-08-26 RX ADMIN — HEPARIN SODIUM 5000 UNIT(S): 5000 INJECTION INTRAVENOUS; SUBCUTANEOUS at 06:22

## 2020-08-26 RX ADMIN — OXYCODONE HYDROCHLORIDE 5 MILLIGRAM(S): 5 TABLET ORAL at 14:10

## 2020-08-26 RX ADMIN — Medication 600 MILLIGRAM(S): at 17:20

## 2020-08-26 RX ADMIN — OXYCODONE HYDROCHLORIDE 5 MILLIGRAM(S): 5 TABLET ORAL at 20:36

## 2020-08-26 RX ADMIN — OXYCODONE HYDROCHLORIDE 5 MILLIGRAM(S): 5 TABLET ORAL at 13:00

## 2020-08-26 RX ADMIN — SODIUM CHLORIDE 125 MILLILITER(S): 9 INJECTION, SOLUTION INTRAVENOUS at 05:01

## 2020-08-26 RX ADMIN — OXYCODONE HYDROCHLORIDE 10 MILLIGRAM(S): 5 TABLET ORAL at 05:02

## 2020-08-26 RX ADMIN — SENNA PLUS 2 TABLET(S): 8.6 TABLET ORAL at 23:53

## 2020-08-26 RX ADMIN — Medication 25 MILLIGRAM(S): at 00:45

## 2020-08-26 RX ADMIN — Medication 325 MILLIGRAM(S): at 14:10

## 2020-08-26 RX ADMIN — Medication 975 MILLIGRAM(S): at 15:54

## 2020-08-26 RX ADMIN — Medication 975 MILLIGRAM(S): at 02:30

## 2020-08-26 RX ADMIN — Medication 975 MILLIGRAM(S): at 09:15

## 2020-08-26 RX ADMIN — OXYCODONE HYDROCHLORIDE 5 MILLIGRAM(S): 5 TABLET ORAL at 00:00

## 2020-08-26 RX ADMIN — Medication 975 MILLIGRAM(S): at 08:05

## 2020-08-26 RX ADMIN — Medication 975 MILLIGRAM(S): at 14:10

## 2020-08-26 RX ADMIN — Medication 500 MILLIGRAM(S): at 14:10

## 2020-08-26 RX ADMIN — Medication 975 MILLIGRAM(S): at 01:59

## 2020-08-26 RX ADMIN — Medication 975 MILLIGRAM(S): at 21:15

## 2020-08-26 RX ADMIN — OXYCODONE HYDROCHLORIDE 5 MILLIGRAM(S): 5 TABLET ORAL at 17:19

## 2020-08-26 RX ADMIN — HEPARIN SODIUM 5000 UNIT(S): 5000 INJECTION INTRAVENOUS; SUBCUTANEOUS at 17:19

## 2020-08-26 RX ADMIN — OXYCODONE HYDROCHLORIDE 5 MILLIGRAM(S): 5 TABLET ORAL at 08:10

## 2020-08-26 RX ADMIN — SIMETHICONE 80 MILLIGRAM(S): 80 TABLET, CHEWABLE ORAL at 14:11

## 2020-08-26 RX ADMIN — Medication 600 MILLIGRAM(S): at 11:16

## 2020-08-26 RX ADMIN — Medication 600 MILLIGRAM(S): at 13:00

## 2020-08-27 ENCOUNTER — TRANSCRIPTION ENCOUNTER (OUTPATIENT)
Age: 35
End: 2020-08-27

## 2020-08-27 VITALS
SYSTOLIC BLOOD PRESSURE: 113 MMHG | DIASTOLIC BLOOD PRESSURE: 73 MMHG | RESPIRATION RATE: 18 BRPM | OXYGEN SATURATION: 98 % | TEMPERATURE: 99 F | HEART RATE: 86 BPM

## 2020-08-27 PROCEDURE — 86780 TREPONEMA PALLIDUM: CPT

## 2020-08-27 PROCEDURE — P9016: CPT

## 2020-08-27 PROCEDURE — 86901 BLOOD TYPING SEROLOGIC RH(D): CPT

## 2020-08-27 PROCEDURE — 86900 BLOOD TYPING SEROLOGIC ABO: CPT

## 2020-08-27 PROCEDURE — 88307 TISSUE EXAM BY PATHOLOGIST: CPT

## 2020-08-27 PROCEDURE — 85730 THROMBOPLASTIN TIME PARTIAL: CPT

## 2020-08-27 PROCEDURE — 85027 COMPLETE CBC AUTOMATED: CPT

## 2020-08-27 PROCEDURE — 86850 RBC ANTIBODY SCREEN: CPT

## 2020-08-27 PROCEDURE — 86769 SARS-COV-2 COVID-19 ANTIBODY: CPT

## 2020-08-27 PROCEDURE — 85610 PROTHROMBIN TIME: CPT

## 2020-08-27 PROCEDURE — 59025 FETAL NON-STRESS TEST: CPT

## 2020-08-27 PROCEDURE — 85384 FIBRINOGEN ACTIVITY: CPT

## 2020-08-27 PROCEDURE — 86923 COMPATIBILITY TEST ELECTRIC: CPT

## 2020-08-27 RX ORDER — IBUPROFEN 200 MG
1 TABLET ORAL
Qty: 0 | Refills: 0 | DISCHARGE
Start: 2020-08-27

## 2020-08-27 RX ORDER — ASCORBIC ACID 60 MG
1 TABLET,CHEWABLE ORAL
Qty: 0 | Refills: 0 | DISCHARGE
Start: 2020-08-27

## 2020-08-27 RX ORDER — ACETAMINOPHEN 500 MG
3 TABLET ORAL
Qty: 0 | Refills: 0 | DISCHARGE
Start: 2020-08-27

## 2020-08-27 RX ORDER — LEVOTHYROXINE SODIUM 125 MCG
1 TABLET ORAL
Qty: 0 | Refills: 0 | DISCHARGE
Start: 2020-08-27

## 2020-08-27 RX ORDER — FERROUS SULFATE 325(65) MG
1 TABLET ORAL
Qty: 0 | Refills: 0 | DISCHARGE
Start: 2020-08-27

## 2020-08-27 RX ORDER — OXYCODONE HYDROCHLORIDE 5 MG/1
1 TABLET ORAL
Qty: 5 | Refills: 0
Start: 2020-08-27

## 2020-08-27 RX ORDER — LEVOTHYROXINE SODIUM 125 MCG
150 TABLET ORAL DAILY
Refills: 0 | Status: DISCONTINUED | OUTPATIENT
Start: 2020-08-27 | End: 2020-08-27

## 2020-08-27 RX ORDER — OXYCODONE HYDROCHLORIDE 5 MG/1
1 TABLET ORAL
Qty: 0 | Refills: 0 | DISCHARGE
Start: 2020-08-27

## 2020-08-27 RX ADMIN — Medication 975 MILLIGRAM(S): at 09:08

## 2020-08-27 RX ADMIN — Medication 600 MILLIGRAM(S): at 04:00

## 2020-08-27 RX ADMIN — Medication 600 MILLIGRAM(S): at 00:01

## 2020-08-27 RX ADMIN — Medication 500 MILLIGRAM(S): at 00:01

## 2020-08-27 RX ADMIN — Medication 975 MILLIGRAM(S): at 14:22

## 2020-08-27 RX ADMIN — Medication 500 MILLIGRAM(S): at 08:19

## 2020-08-27 RX ADMIN — Medication 600 MILLIGRAM(S): at 11:50

## 2020-08-27 RX ADMIN — Medication 600 MILLIGRAM(S): at 06:31

## 2020-08-27 RX ADMIN — Medication 975 MILLIGRAM(S): at 02:46

## 2020-08-27 RX ADMIN — Medication 325 MILLIGRAM(S): at 08:19

## 2020-08-27 RX ADMIN — Medication 325 MILLIGRAM(S): at 14:22

## 2020-08-27 RX ADMIN — Medication 975 MILLIGRAM(S): at 04:00

## 2020-08-27 RX ADMIN — Medication 600 MILLIGRAM(S): at 07:00

## 2020-08-27 RX ADMIN — Medication 150 MICROGRAM(S): at 09:09

## 2020-08-27 RX ADMIN — Medication 600 MILLIGRAM(S): at 12:46

## 2020-08-27 RX ADMIN — Medication 325 MILLIGRAM(S): at 00:01

## 2020-08-27 RX ADMIN — OXYCODONE HYDROCHLORIDE 5 MILLIGRAM(S): 5 TABLET ORAL at 02:47

## 2020-08-27 RX ADMIN — Medication 975 MILLIGRAM(S): at 08:19

## 2020-08-27 RX ADMIN — Medication 500 MILLIGRAM(S): at 14:22

## 2020-08-27 RX ADMIN — OXYCODONE HYDROCHLORIDE 5 MILLIGRAM(S): 5 TABLET ORAL at 11:49

## 2020-08-27 RX ADMIN — HEPARIN SODIUM 5000 UNIT(S): 5000 INJECTION INTRAVENOUS; SUBCUTANEOUS at 06:33

## 2020-08-27 NOTE — DISCHARGE NOTE OB - CARE PLAN
Principal Discharge DX:	 delivery delivered  Goal:	recovery  Assessment and plan of treatment:	call your doctor for fevers, chills, headache, blurry vision, nausea, vomiting, heavy vaginal bleeding, pain not controlled by medication, pain/swelling in legs, opening/drainage from incision. no heavy lifting, nothing in the vagina, no submerging bottom in water.

## 2020-08-27 NOTE — PROGRESS NOTE ADULT - PROBLEM SELECTOR PLAN 1
- Continue regular diet  - Increase ambulation  - Continue motrin, tylenol, oxycodone PRN for pain control.   -H/H is stable    Paramjit Lomeli, PGY-1
- Continue regular diet.  - Increase ambulation.  - Continue motrin, tylenol, oxycodone PRN for pain control  - F/u AM CBC  -Monitor VS  -Fundal checks for bleeding    Erich Orellana PGY-3

## 2020-08-27 NOTE — DISCHARGE NOTE OB - HOSPITAL COURSE
delivery c/b EBL . pt with hemoglobin 7.4 on discharge and asymptomatic. pt counseled re blood transfusion and asx from anemia and declining. vitals are stable. discharged home on pod2 in stable condition with strict precautions reviewed. to f/u in office in two weeks or sooner prn.

## 2020-08-27 NOTE — PROGRESS NOTE ADULT - ASSESSMENT
A/P: 33yo POD#1 s/p rLTCS w/ EBL of 2600 2/2 focal accreta.  Patient received TXA, methergine, Cytotec, hemabate. Hct trended with appropriate drop. Vitals have been stable.
A/P: 33yo POD#2 s/p LTCS.  Patient is stable and doing well post-operatively.

## 2020-08-27 NOTE — DISCHARGE NOTE OB - CARE PROVIDER_API CALL
Jersey Cota  MATERNAL/FETAL MEDICINE  [address] [place] [address], [facility] [address]  [place] [address]  Phone: [phone]  Fax: [phone]  Follow Up Time: 2 weeks

## 2020-08-27 NOTE — DISCHARGE NOTE OB - PLAN OF CARE
recovery call your doctor for fevers, chills, headache, blurry vision, nausea, vomiting, heavy vaginal bleeding, pain not controlled by medication, pain/swelling in legs, opening/drainage from incision. no heavy lifting, nothing in the vagina, no submerging bottom in water.

## 2020-08-27 NOTE — DISCHARGE NOTE OB - INSTRUCTIONS
Keep follow up appointment with doctor as instructed and call doctor with heavy vaginal bleeding or passing large amount of clots, feeling dizzy, light headed, chest discomfort and with any questions or concerns.

## 2020-08-27 NOTE — PROGRESS NOTE ADULT - ATTENDING COMMENTS
pt seen and plan discussed. denies sx of anemia. s/p multiple discussions and does not want blood transfusion. hgb stable yesterday and vital signs stable. pt for discharge with iron/vit c bid and f/u in office. reviewed strict precautions with pt. to f/u in office in two weeks or sooner kim Bucio

## 2020-08-27 NOTE — DISCHARGE NOTE OB - MEDICATION SUMMARY - MEDICATIONS TO STOP TAKING
I will STOP taking the medications listed below when I get home from the hospital:    indomethacin 25 mg oral capsule  -- 1 cap(s) by mouth every 6 hours. Take first dose 6 hours after dose given in PACU  -- Do not take aspirin or aspirin containing products without knowledge and consent of your physician.  It is very important that you take or use this exactly as directed.  Do not skip doses or discontinue unless directed by your doctor.  May cause drowsiness or dizziness.  Obtain medical advice before taking any non-prescription drugs as some may affect the action of this medication.  Take with food or milk.    Macrobid 100 mg oral capsule  -- 1 cap(s) by mouth every 12 hours   -- Finish all this medication unless otherwise directed by prescriber.  May discolor urine or feces.  Take with food or milk.

## 2020-08-27 NOTE — DISCHARGE NOTE OB - MEDICATION SUMMARY - MEDICATIONS TO TAKE
I will START or STAY ON the medications listed below when I get home from the hospital:    acetaminophen 325 mg oral tablet  -- 3 tab(s) by mouth every 6 hours, As Needed  -- Indication: For  delivery delivered    ibuprofen 600 mg oral tablet  -- 1 tab(s) by mouth every 6 hours, As Needed  -- Indication: For  delivery delivered    oxyCODONE 5 mg oral tablet  -- 1 tab(s) by mouth every 4 hours while awake, As Needed -10)  -- Indication: For  delivery delivered    ferrous sulfate 325 mg (65 mg elemental iron) oral tablet  -- 1 tab(s) by mouth 2 times a day  -- Indication: For  delivery delivered    Colace 100 mg oral capsule  -- 1 cap(s) by mouth 2 times a day  -- Indication: For  delivery delivered    levothyroxine 150 mcg (0.15 mg) oral tablet  -- 1 tab(s) by mouth once a day  -- Indication: For  delivery delivered    ascorbic acid 500 mg oral tablet  -- 1 tab(s) by mouth 3 times a day  -- Indication: For  delivery delivered

## 2020-08-27 NOTE — PROGRESS NOTE ADULT - SUBJECTIVE AND OBJECTIVE BOX
Day 1 of Anesthesia Pain Management Service    SUBJECTIVE: I'm having cramping  Pain Scale Score:          [X] Refer to charted pain scores    THERAPY:  s/p 100 mcg PF morphine on 8/25/2020       MEDICATIONS  (STANDING):  acetaminophen   Tablet .. 975 milliGRAM(s) Oral <User Schedule>  ascorbic acid 500 milliGRAM(s) Oral three times a day  diphtheria/tetanus/pertussis (acellular) Vaccine (ADAcel) 0.5 milliLiter(s) IntraMuscular once  ferrous    sulfate 325 milliGRAM(s) Oral three times a day  heparin   Injectable 5000 Unit(s) SubCutaneous every 12 hours  ibuprofen  Tablet. 600 milliGRAM(s) Oral every 6 hours  lactated ringers. 1000 milliLiter(s) (125 mL/Hr) IV Continuous <Continuous>  morphine PF Spinal 0.1 milliGRAM(s) IntraThecal. once  oxytocin Infusion 333.333 milliUNIT(s)/Min (1000 mL/Hr) IV Continuous <Continuous>  oxytocin Infusion 333.333 milliUNIT(s)/Min (1000 mL/Hr) IV Continuous <Continuous>  oxytocin Injectable 5 Unit(s) IntraMuscular once  senna 2 Tablet(s) Oral at bedtime    MEDICATIONS  (PRN):  dexAMETHasone  Injectable 4 milliGRAM(s) IV Push every 6 hours PRN Nausea  diphenhydrAMINE 25 milliGRAM(s) Oral every 6 hours PRN Itching  lanolin Ointment 1 Application(s) Topical every 6 hours PRN Sore Nipples  magnesium hydroxide Suspension 30 milliLiter(s) Oral two times a day PRN Constipation  nalbuphine Injectable 2.5 milliGRAM(s) IV Push every 6 hours PRN Pruritus  naloxone Injectable 0.1 milliGRAM(s) IV Push every 3 minutes PRN For ANY of the following changes in patient status:  A. Breaths Per Minute LESS THAN 10, B. Oxygen saturation LESS THAN 90%, C. Sedation score of 6 for Stop After: 4 Times  ondansetron Injectable 4 milliGRAM(s) IV Push every 6 hours PRN Nausea  oxyCODONE    IR 5 milliGRAM(s) Oral every 3 hours PRN Moderate to Severe Pain (4-10)  oxyCODONE    IR 5 milliGRAM(s) Oral once PRN Moderate to Severe Pain (4-10)  oxyCODONE    IR 5 milliGRAM(s) Oral every 3 hours PRN Mild Pain (1 - 3)  oxyCODONE    IR 10 milliGRAM(s) Oral every 3 hours PRN Moderate Pain (4 - 6)  simethicone 80 milliGRAM(s) Chew every 4 hours PRN Gas      OBJECTIVE:    Sedation:        	[X] Alert	 [ ] Drowsy	[ ] Arousable      [ ] Asleep       [ ] Unresponsive    Side Effects:	[X] None 	[ ] Nausea	[ ] Vomiting         [ ] Pruritus  		[ ] Weakness            [ ] Numbness	          [ ] Other:    Vital Signs Last 24 Hrs  T(C): 36.9 (26 Aug 2020 06:40), Max: 37 (25 Aug 2020 22:15)  T(F): 98.4 (26 Aug 2020 06:40), Max: 98.6 (25 Aug 2020 22:15)  HR: 94 (26 Aug 2020 06:40) (82 - 98)  BP: 103/65 (26 Aug 2020 06:40) (57/38 - 130/51)  BP(mean): 75 (25 Aug 2020 22:15) (43 - 78)  RR: 18 (26 Aug 2020 08:01) (14 - 36)  SpO2: 98% (26 Aug 2020 08:01) (92% - 100%)    ASSESSMENT/ PLAN: Received Oxycodone prn for incisional pain and cramping. Waiting on blood results for possible ketorolac / Motrin dosing prn   [X] Patient transitioned to prn analgesics  [X] Pain management per primary service, pain service to sign off   [X]Documentation and Verification of current medications
OB Progress Note:  Delivery, POD#1    S: 33yo POD#1 s/p rLTCS w/ EBL of 2600 2/2 focal accreta. No events overnight . Her pain is well controlled. She is tolerating a regular diet has not yet passed flatus. Denies N/V. Denies CP/SOB/lightheadedness/dizziness.   She is ambulating without difficulty.   Joseph in place w/ adequate UOP  Denies heavy vaginal bleeding.    O:   Vital Signs Last 24 Hrs  T(C): 36.8 (25 Aug 2020 23:30), Max: 37 (25 Aug 2020 22:15)  T(F): 98.3 (25 Aug 2020 23:30), Max: 98.6 (25 Aug 2020 22:15)  HR: 91 (25 Aug 2020 23:30) (82 - 98)  BP: 98/62 (25 Aug 2020 23:30) (57/38 - 130/51)  BP(mean): 75 (25 Aug 2020 22:15) (43 - 78)  RR: 18 (25 Aug 2020 23:30) (14 - 36)  SpO2: 98% (25 Aug 2020 23:30) (92% - 100%)    Labs:  Blood type: A Positive  Rubella IgG: RPR: Negative                          8.4<L>   9.59 >-----------< 143<L>    (  @ 21:38 )             25.5<L>                        8.7<L>   10.38 >-----------< 147<L>    (  @ 18:29 )             26.4<L>                        9.9<L>   11.08<H> >-----------< 166    (  @ 13:52 )             30.6<L>                        11.4<L>   6.61 >-----------< 168    (  @ 08:59 )             34.4<L>                  PE:  General: NAD  Abdomen: Mildly distended, appropriately tender, Fundus firm, incision c/d/i.  VE: No heavy vaginal bleeding  Extremities: No erythema, no pitting edema
OB Progress Note: LTCS, POD#2    S:  33 yo POD#2 s/p rLTCS w/ EBL 2600 2/2 focal accreta. Pain well controlled, tolerating regular diet, passing faltus, voiding spontaneously, ambulating without difficulty. Denies heavy vaginal bleeding, CP/SOB, lightheadedness/dizziness, nausea/vomiting,     O:  Vitals:  Vital Signs Last 24 Hrs  T(C): 36.6 (27 Aug 2020 06:03), Max: 37 (26 Aug 2020 08:29)  T(F): 97.9 (27 Aug 2020 06:03), Max: 98.6 (26 Aug 2020 08:29)  HR: 89 (27 Aug 2020 06:03) (89 - 94)  BP: 110/68 (27 Aug 2020 06:03) (106/66 - 117/68)  BP(mean): --  RR: 18 (27 Aug 2020 06:03) (18 - 20)  SpO2: 97% (27 Aug 2020 06:03) (97% - 99%)    MEDICATIONS  (STANDING):  acetaminophen   Tablet .. 975 milliGRAM(s) Oral <User Schedule>  ascorbic acid 500 milliGRAM(s) Oral three times a day  diphtheria/tetanus/pertussis (acellular) Vaccine (ADAcel) 0.5 milliLiter(s) IntraMuscular once  ferrous    sulfate 325 milliGRAM(s) Oral three times a day  heparin   Injectable 5000 Unit(s) SubCutaneous every 12 hours  ibuprofen  Tablet. 600 milliGRAM(s) Oral every 6 hours  lactated ringers. 1000 milliLiter(s) (125 mL/Hr) IV Continuous <Continuous>  oxytocin Infusion 333.333 milliUNIT(s)/Min (1000 mL/Hr) IV Continuous <Continuous>  oxytocin Infusion 333.333 milliUNIT(s)/Min (1000 mL/Hr) IV Continuous <Continuous>  oxytocin Injectable 5 Unit(s) IntraMuscular once  senna 2 Tablet(s) Oral at bedtime      MEDICATIONS  (PRN):  diphenhydrAMINE 25 milliGRAM(s) Oral every 6 hours PRN Itching  lanolin Ointment 1 Application(s) Topical every 6 hours PRN Sore Nipples  magnesium hydroxide Suspension 30 milliLiter(s) Oral two times a day PRN Constipation  oxyCODONE    IR 5 milliGRAM(s) Oral once PRN Moderate to Severe Pain (4-10)  oxyCODONE    IR 5 milliGRAM(s) Oral every 3 hours PRN Moderate to Severe Pain (4-10)  simethicone 80 milliGRAM(s) Chew every 4 hours PRN Gas      Labs:  Blood type: A Positive  Rubella IgG: RPR: Negative                          7.4<L>   8.50 >-----------< 148<L>    ( 08-26 @ 10:31 )             22.9<L>                        7.3<L>   8.10 >-----------< 138<L>    ( 08-26 @ 06:52 )             22.3<L>                        8.4<L>   9.59 >-----------< 143<L>    ( 08-25 @ 21:38 )             25.5<L>                        8.7<L>   10.38 >-----------< 147<L>    ( 08-25 @ 18:29 )             26.4<L>                        9.9<L>   11.08<H> >-----------< 166    ( 08-25 @ 13:52 )             30.6<L>                        11.4<L>   6.61 >-----------< 168    ( 08-25 @ 08:59 )             34.4<L>                  PE:  General: NAD  Abdomen: Soft, appropriately tender, Fundus firm incision c/d/i.  VE: No heavy vaginal bleeding  Extremities: No erythema, no pitting edema    A/P: 35yo POD#2 s/p LTCS.  Patient is stable and doing well post-operatively.    - Continue regular diet  - Increase ambulation  - Continue motrin, tylenol, oxycodone PRN for pain control.     Paramjit Lomeli, PGY-1

## 2020-08-31 ENCOUNTER — APPOINTMENT (OUTPATIENT)
Dept: OBGYN | Facility: CLINIC | Age: 35
End: 2020-08-31
Payer: COMMERCIAL

## 2020-08-31 ENCOUNTER — INPATIENT (INPATIENT)
Facility: HOSPITAL | Age: 35
LOS: 3 days | Discharge: HOME | DRG: 776 | End: 2020-09-04
Attending: OBSTETRICS & GYNECOLOGY | Admitting: OBSTETRICS & GYNECOLOGY
Payer: COMMERCIAL

## 2020-08-31 VITALS
SYSTOLIC BLOOD PRESSURE: 152 MMHG | TEMPERATURE: 99 F | WEIGHT: 179.9 LBS | HEART RATE: 96 BPM | DIASTOLIC BLOOD PRESSURE: 88 MMHG | RESPIRATION RATE: 22 BRPM | OXYGEN SATURATION: 99 % | HEIGHT: 63 IN

## 2020-08-31 DIAGNOSIS — N93.9 ABNORMAL UTERINE AND VAGINAL BLEEDING, UNSPECIFIED: ICD-10-CM

## 2020-08-31 DIAGNOSIS — Z98.891 HISTORY OF UTERINE SCAR FROM PREVIOUS SURGERY: Chronic | ICD-10-CM

## 2020-08-31 LAB
ALBUMIN SERPL ELPH-MCNC: 3.7 G/DL — SIGNIFICANT CHANGE UP (ref 3.3–5)
ALP SERPL-CCNC: 81 U/L — SIGNIFICANT CHANGE UP (ref 40–120)
ALT FLD-CCNC: 18 U/L — SIGNIFICANT CHANGE UP (ref 10–45)
ANION GAP SERPL CALC-SCNC: 12 MMOL/L — SIGNIFICANT CHANGE UP (ref 5–17)
APTT BLD: 31.6 SEC — SIGNIFICANT CHANGE UP (ref 27.5–35.5)
AST SERPL-CCNC: 18 U/L — SIGNIFICANT CHANGE UP (ref 10–40)
BASOPHILS # BLD AUTO: 0.02 K/UL — SIGNIFICANT CHANGE UP (ref 0–0.2)
BASOPHILS NFR BLD AUTO: 0.2 % — SIGNIFICANT CHANGE UP (ref 0–2)
BILIRUB SERPL-MCNC: 0.2 MG/DL — SIGNIFICANT CHANGE UP (ref 0.2–1.2)
BLD GP AB SCN SERPL QL: NEGATIVE — SIGNIFICANT CHANGE UP
BUN SERPL-MCNC: 8 MG/DL — SIGNIFICANT CHANGE UP (ref 7–23)
CALCIUM SERPL-MCNC: 9.4 MG/DL — SIGNIFICANT CHANGE UP (ref 8.4–10.5)
CHLORIDE SERPL-SCNC: 104 MMOL/L — SIGNIFICANT CHANGE UP (ref 96–108)
CO2 SERPL-SCNC: 21 MMOL/L — LOW (ref 22–31)
CREAT ?TM UR-MCNC: 71 MG/DL — SIGNIFICANT CHANGE UP
CREAT SERPL-MCNC: 0.73 MG/DL — SIGNIFICANT CHANGE UP (ref 0.5–1.3)
EOSINOPHIL # BLD AUTO: 0.3 K/UL — SIGNIFICANT CHANGE UP (ref 0–0.5)
EOSINOPHIL NFR BLD AUTO: 3.4 % — SIGNIFICANT CHANGE UP (ref 0–6)
GAS PNL BLDV: SIGNIFICANT CHANGE UP
GLUCOSE SERPL-MCNC: 97 MG/DL — SIGNIFICANT CHANGE UP (ref 70–99)
HCT VFR BLD CALC: 25 % — LOW (ref 34.5–45)
HGB BLD-MCNC: 7.9 G/DL — LOW (ref 11.5–15.5)
IMM GRANULOCYTES NFR BLD AUTO: 0.8 % — SIGNIFICANT CHANGE UP (ref 0–1.5)
INR BLD: 1.04 RATIO — SIGNIFICANT CHANGE UP (ref 0.88–1.16)
LYMPHOCYTES # BLD AUTO: 0.73 K/UL — LOW (ref 1–3.3)
LYMPHOCYTES # BLD AUTO: 8.2 % — LOW (ref 13–44)
MCHC RBC-ENTMCNC: 31.5 PG — SIGNIFICANT CHANGE UP (ref 27–34)
MCHC RBC-ENTMCNC: 31.6 GM/DL — LOW (ref 32–36)
MCV RBC AUTO: 99.6 FL — SIGNIFICANT CHANGE UP (ref 80–100)
MONOCYTES # BLD AUTO: 0.54 K/UL — SIGNIFICANT CHANGE UP (ref 0–0.9)
MONOCYTES NFR BLD AUTO: 6.1 % — SIGNIFICANT CHANGE UP (ref 2–14)
NEUTROPHILS # BLD AUTO: 7.22 K/UL — SIGNIFICANT CHANGE UP (ref 1.8–7.4)
NEUTROPHILS NFR BLD AUTO: 81.3 % — HIGH (ref 43–77)
NRBC # BLD: 0 /100 WBCS — SIGNIFICANT CHANGE UP (ref 0–0)
PLATELET # BLD AUTO: 304 K/UL — SIGNIFICANT CHANGE UP (ref 150–400)
POTASSIUM SERPL-MCNC: 4.3 MMOL/L — SIGNIFICANT CHANGE UP (ref 3.5–5.3)
POTASSIUM SERPL-SCNC: 4.3 MMOL/L — SIGNIFICANT CHANGE UP (ref 3.5–5.3)
PROT ?TM UR-MCNC: 14 MG/DL — HIGH (ref 0–12)
PROT SERPL-MCNC: 6.4 G/DL — SIGNIFICANT CHANGE UP (ref 6–8.3)
PROT/CREAT UR-RTO: 0.2 RATIO — SIGNIFICANT CHANGE UP (ref 0–0.2)
PROTHROM AB SERPL-ACNC: 12.3 SEC — SIGNIFICANT CHANGE UP (ref 10.6–13.6)
RBC # BLD: 2.51 M/UL — LOW (ref 3.8–5.2)
RBC # FLD: 13.4 % — SIGNIFICANT CHANGE UP (ref 10.3–14.5)
RH IG SCN BLD-IMP: POSITIVE — SIGNIFICANT CHANGE UP
SODIUM SERPL-SCNC: 137 MMOL/L — SIGNIFICANT CHANGE UP (ref 135–145)
WBC # BLD: 8.88 K/UL — SIGNIFICANT CHANGE UP (ref 3.8–10.5)
WBC # FLD AUTO: 8.88 K/UL — SIGNIFICANT CHANGE UP (ref 3.8–10.5)

## 2020-08-31 PROCEDURE — 99223 1ST HOSP IP/OBS HIGH 75: CPT

## 2020-08-31 PROCEDURE — 76831 ECHO EXAM UTERUS: CPT

## 2020-08-31 PROCEDURE — 74177 CT ABD & PELVIS W/CONTRAST: CPT | Mod: 26

## 2020-08-31 PROCEDURE — 99285 EMERGENCY DEPT VISIT HI MDM: CPT

## 2020-08-31 RX ORDER — AZTREONAM 2 G
1000 VIAL (EA) INJECTION ONCE
Refills: 0 | Status: COMPLETED | OUTPATIENT
Start: 2020-08-31 | End: 2020-08-31

## 2020-08-31 RX ORDER — SENNA PLUS 8.6 MG/1
2 TABLET ORAL AT BEDTIME
Refills: 0 | Status: DISCONTINUED | OUTPATIENT
Start: 2020-08-31 | End: 2020-09-04

## 2020-08-31 RX ORDER — ACETAMINOPHEN 500 MG
975 TABLET ORAL ONCE
Refills: 0 | Status: COMPLETED | OUTPATIENT
Start: 2020-08-31 | End: 2020-08-31

## 2020-08-31 RX ORDER — LEVOTHYROXINE SODIUM 125 MCG
150 TABLET ORAL DAILY
Refills: 0 | Status: DISCONTINUED | OUTPATIENT
Start: 2020-08-31 | End: 2020-09-04

## 2020-08-31 RX ORDER — SODIUM CHLORIDE 9 MG/ML
1000 INJECTION INTRAMUSCULAR; INTRAVENOUS; SUBCUTANEOUS ONCE
Refills: 0 | Status: COMPLETED | OUTPATIENT
Start: 2020-08-31 | End: 2020-08-31

## 2020-08-31 RX ORDER — ACETAMINOPHEN 500 MG
975 TABLET ORAL EVERY 6 HOURS
Refills: 0 | Status: DISCONTINUED | OUTPATIENT
Start: 2020-08-31 | End: 2020-09-04

## 2020-08-31 RX ORDER — FERROUS SULFATE 325(65) MG
325 TABLET ORAL
Refills: 0 | Status: DISCONTINUED | OUTPATIENT
Start: 2020-08-31 | End: 2020-09-04

## 2020-08-31 RX ORDER — HEPARIN SODIUM 5000 [USP'U]/ML
5000 INJECTION INTRAVENOUS; SUBCUTANEOUS EVERY 12 HOURS
Refills: 0 | Status: DISCONTINUED | OUTPATIENT
Start: 2020-08-31 | End: 2020-09-04

## 2020-08-31 RX ORDER — AZTREONAM 2 G
1000 VIAL (EA) INJECTION EVERY 12 HOURS
Refills: 0 | Status: DISCONTINUED | OUTPATIENT
Start: 2020-09-01 | End: 2020-09-01

## 2020-08-31 RX ORDER — ASCORBIC ACID 60 MG
500 TABLET,CHEWABLE ORAL DAILY
Refills: 0 | Status: DISCONTINUED | OUTPATIENT
Start: 2020-08-31 | End: 2020-09-04

## 2020-08-31 RX ORDER — IBUPROFEN 200 MG
600 TABLET ORAL EVERY 6 HOURS
Refills: 0 | Status: DISCONTINUED | OUTPATIENT
Start: 2020-08-31 | End: 2020-09-04

## 2020-08-31 RX ADMIN — Medication 975 MILLIGRAM(S): at 21:47

## 2020-08-31 RX ADMIN — SODIUM CHLORIDE 1000 MILLILITER(S): 9 INJECTION INTRAMUSCULAR; INTRAVENOUS; SUBCUTANEOUS at 22:07

## 2020-08-31 RX ADMIN — Medication 50 MILLIGRAM(S): at 21:19

## 2020-08-31 RX ADMIN — Medication 975 MILLIGRAM(S): at 20:49

## 2020-08-31 RX ADMIN — SODIUM CHLORIDE 1000 MILLILITER(S): 9 INJECTION INTRAMUSCULAR; INTRAVENOUS; SUBCUTANEOUS at 20:49

## 2020-08-31 NOTE — ED PROVIDER NOTE - PROGRESS NOTE DETAILS
Tong: patient febrile to 102F. Nolberto blood cultures, will give tylenol and IVF. Awaiting OB recs for choice of antibiotics. Patient reports bilateral breast soreness with yellow discharge. On exam (chaperone LEIF Burk), no obvious signs of mastitis, no drainage from nipples appreciated, mildly ttp bilaterally.

## 2020-08-31 NOTE — H&P ADULT - ASSESSMENT
33yo  POD#6 s/p rLTCS EBL 2600 2/2 focal placenta accreta admitted with fever. DDx: endometritis vs mastitis vs retained POC.     Neuro: PO Motrin/Tylenol PRN  - CT Head if intractable HA  CV: hemodynamically stable, H/H stable since discharge  - elevated BPs in pp period, continue to monitor VS; HELLP labs wnl  Pulm: saturating well on room air  FEN: SLIV, regular diet, replete lytes PRN  : voiding freely, pad counts  - BSS showing thin EM -> official TVUS to assess for retained POC  - CTAP with contrast to r/o intra-abdominal/pelvic abscess in setting of fevers  Heme: HSQ and SCDs for DVT ppx, coags wnl  ID: febrile x2 38.9 ( @), 38.3 ( @ ); Aztreonam 1g q12h; F/U COVID  - Consult ID in AM for recs  Endo: h/o hypothyroidism, for home Synthroid  Dispo: admit for inpatient care    seen with Cipriano Juarez Alkasab Risha Sinha R2  [id number] 33yo  POD#6 s/p rLTCS EBL 2600 2/2 focal placenta accreta admitted with fever. DDx: endometritis vs mastitis vs retained POC.     Neuro: PO Motrin/Tylenol PRN  - CT Head if intractable HA  CV: hemodynamically stable, H/H stable since discharge  - elevated BPs in pp period, continue to monitor VS; HELLP labs wnl  Pulm: saturating well on room air  FEN: SLIV, regular diet, replete lytes PRN  : voiding freely, pad counts  - BSS showing thin EM -> official TVUS to assess for retained POC  - CTAP with contrast to r/o intra-abdominal/pelvic abscess in setting of fevers  Heme: HSQ and SCDs for DVT ppx, coags wnl  ID: febrile x2 38.9 ( @), 38.3 ( @ ); Aztreonam 1g q12h; F/U COVID  - F/U BCx x2, UCx, b/l breast milk cx ()  - Consult ID in AM for recs  Endo: h/o hypothyroidism, for home Synthroid  Dispo: admit for inpatient care    seen with Cipriano Juarez Alkasab  [facility] R2  [id number]

## 2020-08-31 NOTE — H&P ADULT - NSHPLABSRESULTS_GEN_ALL_CORE
7.9    8.88  )-----------( 304      ( 31 Aug 2020 20:17 )             25.0     08-31    137  |  104  |  8   ----------------------------<  97  4.3   |  21<L>  |  0.73    Ca    9.4      31 Aug 2020 20:17    TPro  6.4  /  Alb  3.7  /  TBili  0.2  /  DBili  x   /  AST  18  /  ALT  18  /  AlkPhos  81  08-31    PT/INR - ( 31 Aug 2020 20:17 )   PT: 12.3 sec;   INR: 1.04 ratio         PTT - ( 31 Aug 2020 20:17 )  PTT:31.6 sec      Blood Type: A Positive      RADIOLOGY & ADDITIONAL STUDIES:

## 2020-08-31 NOTE — ED PROVIDER NOTE - OBJECTIVE STATEMENT
34F  C/S last week presenting with vaginal bleeding daily since the surgery. Saw her OB outpatient today who performed an TVUS showing retained POC. Patient states she is going through 2 pads an hour. Feels dizzy and nauesous. Denies fever, abdominal pain, dyspnea.

## 2020-08-31 NOTE — ED ADULT NURSE NOTE - OBJECTIVE STATEMENT
35 y/o Female presenting to the ED ambulatory, A&Ox3, s/p  section on Tuesday , with active bleeding about 2 pads per hour daily. Pt went to see her OB today where TVUS revealed retained products. Pt arrived today for D&C, OB aware of pt prior to arrival. Pt reports Nausea and dizziness. Pt denies CP, SOB, cough, fever, chills, weakness, numbness, tingling. Incision appears clean and dry, no pus or redness noted. OB at beside preforming ultrasound and discussing plan of care with patient. Pt reports slight pain in the area. Safety and comfort measures provided, bed locked and in lowest position, side rails up for safety. Call bell within reach. Awaiting all results.

## 2020-08-31 NOTE — H&P ADULT - HISTORY OF PRESENT ILLNESS
33yo  POD#6 s/p rLTCS EBL 2600 2/2 focal placenta accreta presenting from office with concern for retained POC in setting of increased lochia. Pt pregnancy and hospital course notable for     G_P_   Last Menstrual Period   presents with     OB/GYN HISTORY:   PAST MEDICAL & SURGICAL HISTORY:    Allergies    aspirin (Anaphylaxis; Hives)  Ceclor (Rash)  penicillins (Hives)    Intolerances      MEDICATIONS  (STANDING):    MEDICATIONS  (PRN):    FAMILY HISTORY:    SOCIAL HISTORY:  REVIEW OF SYSTEMS:    CONSTITUTIONAL: No weakness, fevers or chills  EYES/ENT: No visual changes;  No vertigo or throat pain   NECK: No pain or stiffness  RESPIRATORY: No cough, wheezing, hemoptysis; No shortness of breath  CARDIOVASCULAR: No chest pain or palpitations  GASTROINTESTINAL: No abdominal or epigastric pain. No nausea, vomiting, or hematemesis; No diarrhea or constipation. No melena or hematochezia.  GENITOURINARY: No dysuria, frequency or hematuria  NEUROLOGICAL: No numbness or weakness  SKIN: No itching, burning, rashes, or lesions   All other review of systems is negative unless indicated above.    Name of GYN Physician:  Date of Last Pap:  History of Abnormal Pap:  Date of Last Mammogram:  Date of Last Colonoscopy:    Vital Signs Last 24 Hrs  T(C): 38.9 (31 Aug 2020 20:29), Max: 38.9 (31 Aug 2020 20:29)  T(F): 102 (31 Aug 2020 20:29), Max: 102 (31 Aug 2020 20:29)  HR: 96 (31 Aug 2020 20:54) (96 - 100)  BP: 129/70 (31 Aug 2020 21:15) (129/70 - 152/88)  BP(mean): 87 (31 Aug 2020 21:15) (87 - 87)  RR: 18 (31 Aug 2020 20:54) (18 - 22)  SpO2: 99% (31 Aug 2020 20:54) (99% - 99%)    PHYSICAL EXAM:      Constitutional: alert and oriented x 3    Breasts: no tenderness, no nodules    Respiratory: clear    Cardiovascular: regular rate and rhythm    Gastrointestinal: soft, non tender, + bowel sounds. No hepatosplenomegaly, no palpable masses    Genitourinary: NEFG  Cervix: closed/ long, no CMT  Uterus: normal size, non tender  Adnexa: non tender, no palpable masses    Rectal:     Extremities:    Neurological:    Skin:    Lymph Nodes:        LABS:                        7.9    8.88  )-----------( 304      ( 31 Aug 2020 20:17 )             25.0         137  |  104  |  8   ----------------------------<  97  4.3   |  21<L>  |  0.73    Ca    9.4      31 Aug 2020 20:17    TPro  6.4  /  Alb  3.7  /  TBili  0.2  /  DBili  x   /  AST  18  /  ALT  18  /  AlkPhos  81      PT/INR - ( 31 Aug 2020 20:17 )   PT: 12.3 sec;   INR: 1.04 ratio         PTT - ( 31 Aug 2020 20:17 )  PTT:31.6 sec      Blood Type: A Positive      RADIOLOGY & ADDITIONAL STUDIES: 35yo  POD#6 s/p rLTCS EBL 2600 2/2 focal placenta accreta presenting from OBGYN office (Dr. Cota) with concern for retained POC in setting of increased lochia. Pt pregnancy notable for cervical insufficiency s/p Shirodkar cerclage and reported elevated BPs (no baseline HELLP labs). Pt had an uncomplicated postoperative course until this AM when she awoke with heavy vaginal bleeding saturating her pad. She reports that she continued to change pads [id number], requiring 7-8 pads throughout the day. She presented to the office where she underwent an ultrasound that was concerning for retained POC. She was give Cytotec and instructed to present to ED with any symptoms of anemia or continued vaginal bleeding.    In setting of having lightheadedness after appointment, she presented to ED. She denies CP, SOB, dizziness, fevers, dysuria, nausea/vomiting, abdominal or pelvic pain, breast pain. Pt is not breastfeeding or pumping. She reports slight HA but believes its 2/2 dehydration and having a new child at home. +fatigue. Denies vision changes (floaters, blurriness) or RUQ/epigastric pain.    While in ED, BSS showing thin EM stripe. Decision was made to proceed with official ultrasound imaging.   BPs in ED: 152/81 (), 146/82 (), 138/91 () -> HELLP labs sent. Pt received Tylenol for HA. Reports as feeling pressure behind eyes but unsure if 2/2 crying. Continues to deny vision changes, RUQ/epigastric pain, nausea/vomiting.   142/80 () -> 129/70 () -> 132/69 () -> 132/74 ()    Pt additionally found to be febrile to 38.9C. Decision made to admit for poss endometritis vs mastitis. She reports having a h/o mastitis with a previous pregnancy.     Vital Signs Last 24 Hrs  T(C): 38.9 (31 Aug 2020 20:29), Max: 38.9 (31 Aug 2020 20:29)  T(F): 102 (31 Aug 2020 20:29), Max: 102 (31 Aug 2020 20:29)  HR: 96 (31 Aug 2020 20:54) (96 - 100)  BP: 129/70 (31 Aug 2020 21:15) (129/70 - 152/88)  BP(mean): 87 (31 Aug 2020 21:15) (87 - 87)  RR: 18 (31 Aug 2020 20:54) (18 - 22)  SpO2: 99% (31 Aug 2020 20:54) (99% - 99%)

## 2020-08-31 NOTE — H&P ADULT - NSHPPHYSICALEXAM_GEN_ALL_CORE
Constitutional: tearful, A&Ox3  Breasts: +engorged, nontender, nonerythematous  Respiratory: CTAB  Cardiovascular: Normal S1/S2  Gastrointestinal: soft, no rebound/guarding, uterine fundus nontender  Incision: Pfannenstiel C/D/I with steri strips  Genitourinary: NEFG, pad 3/4 saturated with old blood  Speculum: approx 50cc clot old blood in vault removed; no active bleeding from os   Extremities: +2 edema b/l LE  Neurological: +2 DTR Constitutional: tearful, A&Ox3  Breasts: +engorged, nontender, nonerythematous, no skin changes   Respiratory: CTAB  Cardiovascular: Normal S1/S2  Gastrointestinal: soft, no rebound/guarding, uterine fundus nontender  Incision: Pfannenstiel C/D/I with steri strips  Genitourinary: NEFG, pad 3/4 saturated with old blood  Speculum: approx 50cc clot old blood in vault removed; no active bleeding from os   Extremities: +2 edema b/l LE  Neurological: +2 DTR Constitutional: tearful, A&Ox3  Breasts: +engorged, nontender, nonerythematous, no skin changes   Respiratory: CTAB  Cardiovascular: Normal S1/S2  Gastrointestinal: soft, no rebound/guarding, uterine fundus nontender  Incision: Pfannenstiel C/D/I with steri strips, not indurated, no skin changes  Genitourinary: NEFG, pad 3/4 saturated with old blood  Speculum: approx 50cc clot old blood in vault removed; no active bleeding from os   Extremities: +2 edema b/l LE  Neurological: +2 DTR

## 2020-08-31 NOTE — ED ADULT NURSE NOTE - NS ED NOTE ABUSE SUSPICION NEGLECT YN
Patient given discharge instructions, instructions for use of prescribed medication x 1 and f/u with orthopedics. Patient verbalized understanding and states \"I have a knee brace at home that I use.      Hunter Hollins RN  06/10/19 [id number] No

## 2020-08-31 NOTE — ED ADULT NURSE REASSESSMENT NOTE - NS ED NURSE REASSESS COMMENT FT1
MD aware of fever. OB at bedside to collect breast milk cultures. Blood cultures sent. Safety and comfort measures provided, bed locked and in lowest position, side rails up for safety. Call bell within reach. Awaiting transvaginal ultrasound

## 2020-08-31 NOTE — ED PROVIDER NOTE - PHYSICAL EXAMINATION
GENERAL: non-toxic appearing, in NAD  HEAD: atraumatic, normocephalic  EYES: vision grossly intact, no conjunctivitis or discharge  EARS: hearing grossly intact  NOSE: no nasal discharge, epistaxis   CARDIAC: RRR, normal S1S2,  no appreciable murmurs, no cyanosis, cap refill < 2 seconds  PULM: no respiratory distress, oxygen saturation on RA wnl, CTAB, no crackles, rales, rhonchi, or wheezing  GI: abdomen nondistended, soft, nontender, no guarding or rebound tenderness, no palpable masses  NEURO: awake and alert, follow commands, normal speech, PERRLA, EOMI, no focal motor or sensory deficits, normal gait  MSK: spine appears normal, no joint swelling or erythema, no gross deformities of extremities  EXT: no peripheral edema, calf tenderness, redness or swelling  SKIN: warm, dry, and intact, no rashes  PSYCH: appropriate mood and affect  : deferred to OB resident who was at bedside when patient was being evalutated

## 2020-08-31 NOTE — ED PROVIDER NOTE - ATTENDING CONTRIBUTION TO CARE
34F  C/S last week presenting with vaginal bleeding daily since the surgery here for retained products seen on outpt us, repeat us ordered, seen by ob/gyn for admission for possible d and c but us unremarkable at the bedside,  abd soft mild lower tenderness noted, spiked fever in ED, ob aware abx ordered, cultures sent, ct ordered, thought is the fever is due to mastitis admitted to OB for further work up.

## 2020-08-31 NOTE — H&P ADULT - NSHPREVIEWOFSYSTEMS_GEN_ALL_CORE
CONSTITUTIONAL: +fever   RESPIRATORY: No cough, wheezing; no shortness of breath  CARDIOVASCULAR: No chest pain or palpitations  GASTROINTESTINAL: No abdominal or epigastric pain. No nausea, vomiting; No diarrhea or constipation.   GENITOURINARY: No dysuria, frequency or hematuria  NEUROLOGICAL: No numbness or weakness  SKIN: No itching, burning, rashes, or lesions   All other review of systems is negative unless indicated above.

## 2020-08-31 NOTE — ED PROVIDER NOTE - CLINICAL SUMMARY MEDICAL DECISION MAKING FREE TEXT BOX
34F  C/S last week presenting with vaginal bleeding daily since the surgery. Had retained POC on outpatient TVUS today. On PE, appears well, VS wnl, otherwise non-focal exam.  exam deferred to OB resident as she was in the room upon ED evaluation. Will check pre-ops, +/- repeat TVUS, likely TBA for d/c for retained POC.

## 2020-09-01 DIAGNOSIS — N71.9 INFLAMMATORY DISEASE OF UTERUS, UNSPECIFIED: ICD-10-CM

## 2020-09-01 LAB
BASOPHILS # BLD AUTO: 0.01 K/UL — SIGNIFICANT CHANGE UP (ref 0–0.2)
BASOPHILS NFR BLD AUTO: 0.1 % — SIGNIFICANT CHANGE UP (ref 0–2)
CULTURE RESULTS: SIGNIFICANT CHANGE UP
EOSINOPHIL # BLD AUTO: 0.32 K/UL — SIGNIFICANT CHANGE UP (ref 0–0.5)
EOSINOPHIL NFR BLD AUTO: 4.1 % — SIGNIFICANT CHANGE UP (ref 0–6)
GRAM STN FLD: SIGNIFICANT CHANGE UP
GRAM STN FLD: SIGNIFICANT CHANGE UP
HCT VFR BLD CALC: 26.3 % — LOW (ref 34.5–45)
HGB BLD-MCNC: 8.3 G/DL — LOW (ref 11.5–15.5)
IMM GRANULOCYTES NFR BLD AUTO: 1.3 % — SIGNIFICANT CHANGE UP (ref 0–1.5)
LYMPHOCYTES # BLD AUTO: 1.19 K/UL — SIGNIFICANT CHANGE UP (ref 1–3.3)
LYMPHOCYTES # BLD AUTO: 15.3 % — SIGNIFICANT CHANGE UP (ref 13–44)
MCHC RBC-ENTMCNC: 31.3 PG — SIGNIFICANT CHANGE UP (ref 27–34)
MCHC RBC-ENTMCNC: 31.6 GM/DL — LOW (ref 32–36)
MCV RBC AUTO: 99.2 FL — SIGNIFICANT CHANGE UP (ref 80–100)
MONOCYTES # BLD AUTO: 0.59 K/UL — SIGNIFICANT CHANGE UP (ref 0–0.9)
MONOCYTES NFR BLD AUTO: 7.6 % — SIGNIFICANT CHANGE UP (ref 2–14)
NEUTROPHILS # BLD AUTO: 5.55 K/UL — SIGNIFICANT CHANGE UP (ref 1.8–7.4)
NEUTROPHILS NFR BLD AUTO: 71.6 % — SIGNIFICANT CHANGE UP (ref 43–77)
NRBC # BLD: 0 /100 WBCS — SIGNIFICANT CHANGE UP (ref 0–0)
PLATELET # BLD AUTO: 325 K/UL — SIGNIFICANT CHANGE UP (ref 150–400)
RBC # BLD: 2.65 M/UL — LOW (ref 3.8–5.2)
RBC # FLD: 13.5 % — SIGNIFICANT CHANGE UP (ref 10.3–14.5)
SARS-COV-2 RNA SPEC QL NAA+PROBE: SIGNIFICANT CHANGE UP
SPECIMEN SOURCE: SIGNIFICANT CHANGE UP
WBC # BLD: 7.76 K/UL — SIGNIFICANT CHANGE UP (ref 3.8–10.5)
WBC # FLD AUTO: 7.76 K/UL — SIGNIFICANT CHANGE UP (ref 3.8–10.5)

## 2020-09-01 PROCEDURE — 99223 1ST HOSP IP/OBS HIGH 75: CPT

## 2020-09-01 PROCEDURE — 76856 US EXAM PELVIC COMPLETE: CPT | Mod: 26

## 2020-09-01 PROCEDURE — 93976 VASCULAR STUDY: CPT | Mod: 26,59

## 2020-09-01 PROCEDURE — 99232 SBSQ HOSP IP/OBS MODERATE 35: CPT

## 2020-09-01 RX ORDER — AZTREONAM 2 G
1000 VIAL (EA) INJECTION EVERY 8 HOURS
Refills: 0 | Status: DISCONTINUED | OUTPATIENT
Start: 2020-09-01 | End: 2020-09-03

## 2020-09-01 RX ORDER — METRONIDAZOLE 500 MG
500 TABLET ORAL ONCE
Refills: 0 | Status: COMPLETED | OUTPATIENT
Start: 2020-09-01 | End: 2020-09-01

## 2020-09-01 RX ORDER — AZTREONAM 2 G
VIAL (EA) INJECTION
Refills: 0 | Status: DISCONTINUED | OUTPATIENT
Start: 2020-09-01 | End: 2020-09-03

## 2020-09-01 RX ORDER — VANCOMYCIN HCL 1 G
1000 VIAL (EA) INTRAVENOUS EVERY 12 HOURS
Refills: 0 | Status: DISCONTINUED | OUTPATIENT
Start: 2020-09-01 | End: 2020-09-03

## 2020-09-01 RX ORDER — METRONIDAZOLE 500 MG
500 TABLET ORAL EVERY 8 HOURS
Refills: 0 | Status: DISCONTINUED | OUTPATIENT
Start: 2020-09-01 | End: 2020-09-03

## 2020-09-01 RX ORDER — DIPHENHYDRAMINE HCL 50 MG
25 CAPSULE ORAL EVERY 4 HOURS
Refills: 0 | Status: DISCONTINUED | OUTPATIENT
Start: 2020-09-01 | End: 2020-09-04

## 2020-09-01 RX ORDER — AZTREONAM 2 G
1000 VIAL (EA) INJECTION ONCE
Refills: 0 | Status: COMPLETED | OUTPATIENT
Start: 2020-09-01 | End: 2020-09-01

## 2020-09-01 RX ORDER — METRONIDAZOLE 500 MG
TABLET ORAL
Refills: 0 | Status: DISCONTINUED | OUTPATIENT
Start: 2020-09-01 | End: 2020-09-03

## 2020-09-01 RX ADMIN — Medication 25 MILLIGRAM(S): at 15:40

## 2020-09-01 RX ADMIN — Medication 975 MILLIGRAM(S): at 12:32

## 2020-09-01 RX ADMIN — HEPARIN SODIUM 5000 UNIT(S): 5000 INJECTION INTRAVENOUS; SUBCUTANEOUS at 17:33

## 2020-09-01 RX ADMIN — Medication 325 MILLIGRAM(S): at 17:33

## 2020-09-01 RX ADMIN — Medication 500 MILLIGRAM(S): at 11:42

## 2020-09-01 RX ADMIN — Medication 50 MILLIGRAM(S): at 22:16

## 2020-09-01 RX ADMIN — Medication 975 MILLIGRAM(S): at 11:41

## 2020-09-01 RX ADMIN — Medication 150 MICROGRAM(S): at 05:52

## 2020-09-01 RX ADMIN — Medication 975 MILLIGRAM(S): at 05:52

## 2020-09-01 RX ADMIN — Medication 975 MILLIGRAM(S): at 17:32

## 2020-09-01 RX ADMIN — Medication 100 MILLIGRAM(S): at 22:15

## 2020-09-01 RX ADMIN — Medication 50 MILLIGRAM(S): at 09:31

## 2020-09-01 RX ADMIN — Medication 325 MILLIGRAM(S): at 05:52

## 2020-09-01 RX ADMIN — Medication 250 MILLIGRAM(S): at 17:32

## 2020-09-01 RX ADMIN — Medication 25 MILLIGRAM(S): at 19:07

## 2020-09-01 RX ADMIN — Medication 100 MILLIGRAM(S): at 15:37

## 2020-09-01 RX ADMIN — Medication 50 MILLIGRAM(S): at 14:45

## 2020-09-01 RX ADMIN — HEPARIN SODIUM 5000 UNIT(S): 5000 INJECTION INTRAVENOUS; SUBCUTANEOUS at 05:52

## 2020-09-01 NOTE — ED ADULT NURSE REASSESSMENT NOTE - NS ED NURSE REASSESS COMMENT FT1
Pt received from LEIF BARKER in purple, pt NAD, VSS, ambulated independently to bathroom. Pt pending covid swab before bed assignment.

## 2020-09-01 NOTE — PROGRESS NOTE ADULT - SUBJECTIVE AND OBJECTIVE BOX
OB Progress Note:  Delivery, POD#7    S: 35yo POD#7 s/p LTCS a/w elevated BP, fever 2/2 endometritis vs retanied POC vs mastitis POD#6 s/p rLTCS (2600/3400/175) and partial cerclage removal c/b focal accreta and uterine atony. She said that her pain has significantly improved. She endorses some breast tenderness. She is not breast feeding, but she is expressing milk. Her pain is well controlled. She is tolerating a regular diet   She is ambulating without difficulty.   Voiding spontaneously  Denies heavy vaginal bleeding.    O:   Vital Signs Last 24 Hrs  T(C): 37.9 (01 Sep 2020 05:39), Max: 38.9 (31 Aug 2020 20:29)  T(F): 100.2 (01 Sep 2020 05:39), Max: 102 (31 Aug 2020 20:29)  HR: 85 (01 Sep 2020 05:39) (82 - 101)  BP: 128/82 (01 Sep 2020 05:39) (128/82 - 152/88)  BP(mean): 87 (31 Aug 2020 21:15) (87 - 87)  RR: 18 (01 Sep 2020 05:39) (17 - 22)  SpO2: 98% (01 Sep 2020 05:39) (98% - 100%)    Labs:  Blood type: A Positive  Rubella IgG: RPR: Negative                          7.9<L>   8.88 >-----------< 304    (  @ 20:17 )             25.0<L>    20 @ 20:17      137  |  104  |  8   ----------------------------<  97  4.3   |  21<L>  |  0.73        Ca    9.4      31 Aug 2020 20:17    TPro  6.4  /  Alb  3.7  /  TBili  0.2  /  DBili  x   /  AST  18  /  ALT  18  /  AlkPhos  81  20 @ 20:17          PE:  General: NAD  Breast: Area on right breast approximately 4cm at 2-3o'clock that is erythematous & tender.   Abdomen: Mildly distended, appropriately tender, Fundus firm, incision c/d/i.  VE: No heavy vaginal bleeding  Extremities: No erythema, no pitting edema    TVUS (): Questionable increased vascularity w/i endometrial canal favoring normal uterine tissue vs. RPOC(p)  CTAP (): Small post-op fluid collection vs hematoma w/in pelvis anterior to lower uterus. Post op air in endometrial canal (normal vs. endometritis) (p)

## 2020-09-01 NOTE — PROGRESS NOTE ADULT - ASSESSMENT
A/P: 33yo POD#1 s/p LTCS a/w elevated BP, fever 2/2 endometritis vs retanied POC vs mastitis POD#6 s/p rLTCS (2600/3400/175) and partial cerclage removal c/b focal accreta and uterine atony. Breast milk culture grew gram variable coccobacilli. Pain improved after antibiotics. - Increase ambulation. A/P: 33yo POD#7 s/p LTCS a/w elevated BP, fever 2/2 endometritis vs retanied POC vs mastitis POD#6 s/p rLTCS (2600/3400/175) and partial cerclage removal c/b focal accreta and uterine atony. Breast milk culture grew gram variable coccobacilli. Pain improved after antibiotics. - Increase ambulation.

## 2020-09-01 NOTE — CONSULT NOTE ADULT - ASSESSMENT
33yo  POD#6 s/p rLTCS EBL 2600 2/2 focal placenta accreta presenting from OBGYN office (Dr. Cota) with concern for retained POC in setting of increased lochia. Pt pregnancy notable for cervical insufficiency s/p Shirodkar cerclage and reported elevated BPs (no baseline HELLP labs). Pt had an uncomplicated postoperative course until this AM when she awoke with heavy vaginal bleeding saturating her pad. She reports that she continued to change pads [id number], requiring 7-8 pads throughout the day. She presented to the office where she underwent an ultrasound that was concerning for retained POC. She was give Cytotec and instructed to present to ED with any symptoms of anemia or continued vaginal bleeding.    In setting of having lightheadedness after appointment, she presented to ED. She denies CP, SOB, dizziness, fevers, dysuria, nausea/vomiting, abdominal or pelvic pain, breast pain. Pt is not breastfeeding or pumping. She reports slight HA but believes its 2/2 dehydration and having a new child at home. +fatigue. Denies vision changes (floaters, blurriness) or RUQ/epigastric pain.    While in ED, BSS showing thin EM stripe. Decision was made to proceed with official ultrasound imaging.   BPs in ED: 152/81 (193), 146/82 (), 138/91 () -> HELLP labs sent. Pt received Tylenol for HA. Reports as feeling pressure behind eyes but unsure if 2/2 crying. Continues to deny vision changes, RUQ/epigastric pain, nausea/vomiting.   142/80 () -> 129/70 () -> 132/69 () -> 132/74 ()    Pt additionally found to be febrile to 38.9C. Decision made to admit for poss endometritis vs mastitis. She reports having a h/o mastitis with a previous pregnancy.   Pt is not breast feeding and is letting the breasts "dry up"    Unclear if patient is actually infected or not. Pt with normal WBC but has fever  Pt had blood and urine cultured. unclear the significance of the breast milk as the breasts do not seem infected    OB and Radiology are carefully following the uterus for any evidence of RP  Await urine cultures and blood cultures    Would include gram positive and anaerobic coverage  Hesitant to challenge pt with a carbapenem with h/o anaphylaxis to aspirin and being such an "alergic :" person  would start vancomycin ( follow levels) and flagyl and increase the aztreonam to 1 gm IVSs Q 8 hours    Suggest allergy input to see if we could use ertapenem    follow HCT    follow WBC and temp curve

## 2020-09-01 NOTE — CONSULT NOTE ADULT - SUBJECTIVE AND OBJECTIVE BOX
Patient is a 34y old  Female who presents with a chief complaint of     HPI:  35yo  POD#6 s/p rLTCS EBL 2600 2/2 focal placenta accreta presenting from OBGYN office (Dr. Cota) with concern for retained POC in setting of increased lochia. Pt pregnancy notable for cervical insufficiency s/p Shirodkar cerclage and reported elevated BPs (no baseline HELLP labs). Pt had an uncomplicated postoperative course until this AM when she awoke with heavy vaginal bleeding saturating her pad. She reports that she continued to change pads [id number], requiring 7-8 pads throughout the day. She presented to the office where she underwent an ultrasound that was concerning for retained POC. She was give Cytotec and instructed to present to ED with any symptoms of anemia or continued vaginal bleeding.    In setting of having lightheadedness after appointment, she presented to ED. She denies CP, SOB, dizziness, fevers, dysuria, nausea/vomiting, abdominal or pelvic pain, breast pain. Pt is not breastfeeding or pumping. She reports slight HA but believes its 2/2 dehydration and having a new child at home. +fatigue. Denies vision changes (floaters, blurriness) or RUQ/epigastric pain.    While in ED, BSS showing thin EM stripe. Decision was made to proceed with official ultrasound imaging.   BPs in ED: 152/81 (193), 146/82 (), 138/91 () -> HELLP labs sent. Pt received Tylenol for HA. Reports as feeling pressure behind eyes but unsure if 2/2 crying. Continues to deny vision changes, RUQ/epigastric pain, nausea/vomiting.   142/80 () -> 129/70 () -> 132/69 () -> 132/74 ()    Pt additionally found to be febrile to 38.9C. Decision made to admit for poss endometritis vs mastitis. She reports having a h/o mastitis with a previous pregnancy.   Pt is not breast feeding and is letting the breasts "dry up"    Vital Signs Last 24 Hrs  T(C): 38.9 (31 Aug 2020 20:29), Max: 38.9 (31 Aug 2020 20:29)  T(F): 102 (31 Aug 2020 20:29), Max: 102 (31 Aug 2020 20:29)  HR: 96 (31 Aug 2020 20:54) (96 - 100)  BP: 129/70 (31 Aug 2020 21:15) (129/70 - 152/88)  BP(mean): 87 (31 Aug 2020 21:15) (87 - 87)  RR: 18 (31 Aug 2020 20:54) (18 - 22)  SpO2: 99% (31 Aug 2020 20:54) (99% - 99%) (31 Aug 2020 21:36)      PAST MEDICAL & SURGICAL HISTORY:  Migraines  Hypothyroidism  Vaginal delivery  H/O  section: 1x LTCS, 1x CCS      Social history:   Marital Status:   Occupation: teaches special ed  Lives with:  and children    Substance Use : denies  Tobacco Usage:  (  x ) never smoked   (   ) former smoker   (   ) current smoker  (     ) pack year  (        ) last tobacco use date  Alcohol Usage: denies  Travel: denies  Pets: denies          FAMILY HISTORY:  mother - 60- thyroid cancer  father -63- htn    REVIEW OF SYSTEMS  General:	fevers, chills    Skin:No rash  	  Ophthalmologic:Denies any visual complaints,discharge redness or photophobia  	  ENMT:No nasal discharge,headache,sinus congestion or throat pain.No dental complaints    Respiratory and Thorax:No cough,sputum or chest pain.Denies shortness of breath  	  Cardiovascular:	No chest pain,palpitaions or dizziness    Gastrointestinal:	soft stools BID    Genitourinary:	No dysuria,or flank pain  has frequency   less vaginal bleeding today    Musculoskeletal:	No joint swelling or pain.No weakness    Neurological:No confusion,diziness.No extremity weakness.No bladder or bowel incontinence	    Psychiatric:No delusions or hallucinations	    Hematology/Lymphatics:	No LN swelling.No gum bleeding     Endocrine:	No recent weight gain or loss. No abnormal heat/cold intolerance    Allergic/Immunologic:	No hives or rash     Allergies    aspirin (Anaphylaxis; Hives)- trouble breathing  Ceclor (Rash)  keflex-> rash on neck and itching  penicillins - rash when young    Intolerances        Antimicrobials:       MEDICATIONS  (prior antimicrobials ):  aztreonam  IVPB   50 mL/Hr IV Intermittent (20 @ 21:19)    aztreonam  IVPB   50 mL/Hr IV Intermittent (20 @ 09:31)             aztreonam  IVPB 1000 milliGRAM(s) IV Intermittent every 12 hours      MEDICATIONS  (STANDING):  ascorbic acid 500 milliGRAM(s) Oral daily  aztreonam  IVPB 1000 milliGRAM(s) IV Intermittent every 12 hours  ferrous    sulfate 325 milliGRAM(s) Oral two times a day  heparin   Injectable 5000 Unit(s) SubCutaneous every 12 hours  levothyroxine 150 MICROGram(s) Oral daily  senna 2 Tablet(s) Oral at bedtime    MEDICATIONS  (PRN):  acetaminophen   Tablet .. 975 milliGRAM(s) Oral every 6 hours PRN Moderate Pain (4 - 6)  ibuprofen  Tablet. 600 milliGRAM(s) Oral every 6 hours PRN Moderate Pain (4 - 6)        Vital Signs Last 24 Hrs  T(C): 37.4 (01 Sep 2020 13:00), Max: 38.9 (31 Aug 2020 20:29)  T(F): 99.4 (01 Sep 2020 13:00), Max: 102 (31 Aug 2020 20:29)  HR: 89 (01 Sep 2020 13:00) (82 - 101)  BP: 130/85 (01 Sep 2020 13:00) (127/82 - 152/88)  BP(mean): 87 (31 Aug 2020 21:15) (87 - 87)  RR: 18 (01 Sep 2020 13:00) (17 - 22)  SpO2: 98% (01 Sep 2020 13:00) (98% - 100%)    PHYSICAL EXAM:Pt is upset       Constitutional:Comfortable.Awake and alert  No cachexia     Eyes:PERRL EOMI.NO discharge or conjunctival injection    ENMT:No sinus tenderness.No thrush.No pharyngeal exudate or erythema.F    Neck:Supple,No LN,no JVD    breasts- engorged, no erythema   Respiratory:Good air entry bilaterally,CTA    Cardiovascular:S1 S2     Gastrointestinal:Soft BS(+) no tenderness    incision- C/D/I    Extremities:bipedal  edema.    Vascular:peripheral pulses felt    Neurological:AAO X 3,No grossly focal deficits    Skin:No rash     Lymph Nodes:No palpable LNs    Musculoskeletal:No joint swelling or LOM    Psychiatric:Affect normal.                          7.9    8.88  )-----------( 304      ( 31 Aug 2020 20:17 )             25.0     LIVER FUNCTIONS - ( 31 Aug 2020 20:17 )  Alb: 3.7 g/dL / Pro: 6.4 g/dL / ALK PHOS: 81 U/L / ALT: 18 U/L / AST: 18 U/L / GGT: x                 137  |  104  |  8   ----------------------------<  97  4.3   |  21<L>  |  0.73    Ca    9.4      31 Aug 2020 20:17    TPro  6.4  /  Alb  3.7  /  TBili  0.2  /  DBili  x   /  AST  18  /  ALT  18  /  AlkPhos  81                RECENT CULTURES:   @ 01:25  .Body Fluid Breast Milk  --            Radiology:        < from: US Pelvis Complete (20 @ 00:25) >  ******PRELIMINARY REPORT******    ******PRELIMINARY REPORT******          EXAM:  US TRANSVAGINAL                          EXAM:  US DPLX PELVIC                          EXAM:  US PELVIC COMPLETE                            PROCEDURE DATE:  2020      ******PRELIMINARY REPORT******    ******PRELIMINARY REPORT******              INTERPRETATION:  limited study secondary to large amount of air within the endometrial canal. questionable area of increaesd vascularity within the endometrial canal,favored to represent normal uterine tissue over RPOC. recommend continued trend beta hCG and possible f/u ultrasound to ensure no RPOC.  reviewed with radiology attending Dr. Vasquez.  f/u official report in AM.            ******PRELIMINARY REPORT******    ******PRELIMINARY REPORT******          MI HASSAN M.D., RADIOLOGY RESIDENT    < end of copied text >    < from: CT Abdomen and Pelvis w/ IV Cont (20 @ 23:24) >  EXAM:  CT ABDOMEN AND PELVIS IC                            PROCEDURE DATE:  2020            INTERPRETATION:  CLINICAL INFORMATION: Abdominal pain with recent .    COMPARISON: None.    PROCEDURE:  CT of the Abdomen and Pelvis was performed with intravenous contrast.  Intravenous contrast: 90 ml Omnipaque 350. 10 ml discarded.  Oral contrast: None.  Sagittal and coronal reformats were performed.    FINDINGS:  LOWER CHEST: Within normal limits.    LIVER: Within normal limits.  BILE DUCTS: Normal caliber.  GALLBLADDER: Within normal limits.  SPLEEN: Within normal limits.  PANCREAS: Within normal limits.  ADRENALS: Within normal limits.  KIDNEYS/URETERS: Within normal limits.    BLADDER: Within normal limits.  REPRODUCTIVE ORGANS:Status post  with small free fluid and hemorrhage anterior to the uterus. Multiple foci of air within the endometrial canal.    BOWEL: No bowel obstruction. Appendix is normal.  PERITONEUM: Trace ascites. No pneumoperitoneum.  VESSELS: Within normal limits.  RETROPERITONEUM/LYMPH NODES: No lymphadenopathy.  ABDOMINAL WALL: Postsurgical changes.  BONES: Within normal limits.    IMPRESSION:    Status post  with small free fluid and hemorrhage anterior to the uterus.    Multiple foci of air within the endometrial canal; correlate for endometritis.              BOBBY BERUMEN M.D., RADIOLOGY RESIDENT  This document has been electronically signed.  SELVIN VÁZQUEZ M.D., ATTENDING RADIOLOGIST  This document has been electronically signed. Sep  1 2020  9:52AM              < end of copied text >

## 2020-09-02 LAB
-  AMPICILLIN/SULBACTAM: SIGNIFICANT CHANGE UP
-  AMPICILLIN/SULBACTAM: SIGNIFICANT CHANGE UP
-  CEFAZOLIN: SIGNIFICANT CHANGE UP
-  CEFAZOLIN: SIGNIFICANT CHANGE UP
-  CLINDAMYCIN: SIGNIFICANT CHANGE UP
-  CLINDAMYCIN: SIGNIFICANT CHANGE UP
-  ERYTHROMYCIN: SIGNIFICANT CHANGE UP
-  ERYTHROMYCIN: SIGNIFICANT CHANGE UP
-  GENTAMICIN: SIGNIFICANT CHANGE UP
-  GENTAMICIN: SIGNIFICANT CHANGE UP
-  OXACILLIN: SIGNIFICANT CHANGE UP
-  OXACILLIN: SIGNIFICANT CHANGE UP
-  PENICILLIN: SIGNIFICANT CHANGE UP
-  PENICILLIN: SIGNIFICANT CHANGE UP
-  RIFAMPIN: SIGNIFICANT CHANGE UP
-  RIFAMPIN: SIGNIFICANT CHANGE UP
-  TETRACYCLINE: SIGNIFICANT CHANGE UP
-  TETRACYCLINE: SIGNIFICANT CHANGE UP
-  TRIMETHOPRIM/SULFAMETHOXAZOLE: SIGNIFICANT CHANGE UP
-  TRIMETHOPRIM/SULFAMETHOXAZOLE: SIGNIFICANT CHANGE UP
-  VANCOMYCIN: SIGNIFICANT CHANGE UP
-  VANCOMYCIN: SIGNIFICANT CHANGE UP
BASOPHILS # BLD AUTO: 0.02 K/UL — SIGNIFICANT CHANGE UP (ref 0–0.2)
BASOPHILS NFR BLD AUTO: 0.2 % — SIGNIFICANT CHANGE UP (ref 0–2)
EOSINOPHIL # BLD AUTO: 0.54 K/UL — HIGH (ref 0–0.5)
EOSINOPHIL NFR BLD AUTO: 6.5 % — HIGH (ref 0–6)
HCT VFR BLD CALC: 27.7 % — LOW (ref 34.5–45)
HGB BLD-MCNC: 8.5 G/DL — LOW (ref 11.5–15.5)
IMM GRANULOCYTES NFR BLD AUTO: 0.8 % — SIGNIFICANT CHANGE UP (ref 0–1.5)
LYMPHOCYTES # BLD AUTO: 1.29 K/UL — SIGNIFICANT CHANGE UP (ref 1–3.3)
LYMPHOCYTES # BLD AUTO: 15.6 % — SIGNIFICANT CHANGE UP (ref 13–44)
MCHC RBC-ENTMCNC: 30.5 PG — SIGNIFICANT CHANGE UP (ref 27–34)
MCHC RBC-ENTMCNC: 30.7 GM/DL — LOW (ref 32–36)
MCV RBC AUTO: 99.3 FL — SIGNIFICANT CHANGE UP (ref 80–100)
METHOD TYPE: SIGNIFICANT CHANGE UP
MONOCYTES # BLD AUTO: 0.57 K/UL — SIGNIFICANT CHANGE UP (ref 0–0.9)
MONOCYTES NFR BLD AUTO: 6.9 % — SIGNIFICANT CHANGE UP (ref 2–14)
NEUTROPHILS # BLD AUTO: 5.76 K/UL — SIGNIFICANT CHANGE UP (ref 1.8–7.4)
NEUTROPHILS NFR BLD AUTO: 70 % — SIGNIFICANT CHANGE UP (ref 43–77)
NRBC # BLD: 0 /100 WBCS — SIGNIFICANT CHANGE UP (ref 0–0)
PLATELET # BLD AUTO: 364 K/UL — SIGNIFICANT CHANGE UP (ref 150–400)
RBC # BLD: 2.79 M/UL — LOW (ref 3.8–5.2)
RBC # FLD: 13.5 % — SIGNIFICANT CHANGE UP (ref 10.3–14.5)
WBC # BLD: 8.25 K/UL — SIGNIFICANT CHANGE UP (ref 3.8–10.5)
WBC # FLD AUTO: 8.25 K/UL — SIGNIFICANT CHANGE UP (ref 3.8–10.5)

## 2020-09-02 PROCEDURE — 99232 SBSQ HOSP IP/OBS MODERATE 35: CPT

## 2020-09-02 RX ADMIN — Medication 975 MILLIGRAM(S): at 11:39

## 2020-09-02 RX ADMIN — Medication 250 MILLIGRAM(S): at 20:16

## 2020-09-02 RX ADMIN — Medication 25 MILLIGRAM(S): at 07:55

## 2020-09-02 RX ADMIN — Medication 50 MILLIGRAM(S): at 14:35

## 2020-09-02 RX ADMIN — HEPARIN SODIUM 5000 UNIT(S): 5000 INJECTION INTRAVENOUS; SUBCUTANEOUS at 17:56

## 2020-09-02 RX ADMIN — Medication 600 MILLIGRAM(S): at 14:35

## 2020-09-02 RX ADMIN — Medication 250 MILLIGRAM(S): at 07:55

## 2020-09-02 RX ADMIN — Medication 325 MILLIGRAM(S): at 17:55

## 2020-09-02 RX ADMIN — Medication 975 MILLIGRAM(S): at 12:15

## 2020-09-02 RX ADMIN — Medication 100 MILLIGRAM(S): at 23:57

## 2020-09-02 RX ADMIN — Medication 150 MICROGRAM(S): at 06:35

## 2020-09-02 RX ADMIN — Medication 50 MILLIGRAM(S): at 05:18

## 2020-09-02 RX ADMIN — Medication 325 MILLIGRAM(S): at 05:57

## 2020-09-02 RX ADMIN — Medication 500 MILLIGRAM(S): at 17:54

## 2020-09-02 RX ADMIN — Medication 100 MILLIGRAM(S): at 06:35

## 2020-09-02 RX ADMIN — Medication 975 MILLIGRAM(S): at 05:17

## 2020-09-02 RX ADMIN — Medication 100 MILLIGRAM(S): at 15:41

## 2020-09-02 RX ADMIN — Medication 975 MILLIGRAM(S): at 17:55

## 2020-09-02 RX ADMIN — Medication 600 MILLIGRAM(S): at 15:30

## 2020-09-02 RX ADMIN — HEPARIN SODIUM 5000 UNIT(S): 5000 INJECTION INTRAVENOUS; SUBCUTANEOUS at 05:57

## 2020-09-02 RX ADMIN — Medication 975 MILLIGRAM(S): at 06:00

## 2020-09-02 RX ADMIN — Medication 25 MILLIGRAM(S): at 20:14

## 2020-09-02 NOTE — PROGRESS NOTE ADULT - SUBJECTIVE AND OBJECTIVE BOX
Patient is a 34y old  Female who presents with a chief complaint of   Being followed by ID for        Interval history:  pt feeling improved  less bleeding  No other acute events        PAST MEDICAL & SURGICAL HISTORY:  Migraines  Hypothyroidism  Vaginal delivery  H/O  section: 1x LTCS, 1x CCS    Allergies    aspirin (Anaphylaxis; Hives)  Ceclor (Rash)  penicillins (Hives)    Intolerances      Antimicrobials:    aztreonam  IVPB      aztreonam  IVPB 1000 milliGRAM(s) IV Intermittent every 8 hours  metroNIDAZOLE  IVPB 500 milliGRAM(s) IV Intermittent every 8 hours  metroNIDAZOLE  IVPB      vancomycin  IVPB 1000 milliGRAM(s) IV Intermittent every 12 hours    MEDICATIONS  (STANDING):  ascorbic acid 500 milliGRAM(s) Oral daily  aztreonam  IVPB      aztreonam  IVPB 1000 milliGRAM(s) IV Intermittent every 8 hours  ferrous    sulfate 325 milliGRAM(s) Oral two times a day  heparin   Injectable 5000 Unit(s) SubCutaneous every 12 hours  levothyroxine 150 MICROGram(s) Oral daily  metroNIDAZOLE  IVPB 500 milliGRAM(s) IV Intermittent every 8 hours  metroNIDAZOLE  IVPB      senna 2 Tablet(s) Oral at bedtime  vancomycin  IVPB 1000 milliGRAM(s) IV Intermittent every 12 hours      Vital Signs Last 24 Hrs  T(C): 37 (20 @ 17:00), Max: 37.3 (20 @ 00:11)  T(F): 98.6 (20 @ 17:00), Max: 99.2 (20 @ 00:11)  HR: 80 (20 @ 17:00) (80 - 93)  BP: 116/75 (20 @ 17:00) (116/75 - 132/89)  BP(mean): --  RR: 18 (20 @ 17:00) (17 - 18)  SpO2: 98% (20 @ 17:00) (95% - 98%)    Physical Exam:    Constitutional well preserved,comfortable,pleasant    HEENT PERRLA EOMI,No pallor or icterus    No oral exudate or erythema    Neck supple no JVD or LN    Chest Good AE,CTA    CVS RRR S1 S2     Abd soft BS normal No tenderness      incision - C/D/I    Ext No cyanosis clubbing or edema    IV site no erythema tenderness or discharge    Joints no swelling or LOM    CNS AAO X 3 no focal    Lab Data:                          8.5    8.25  )-----------( 364      ( 02 Sep 2020 06:48 )             27.7           137  |  104  |  8   ----------------------------<  97  4.3   |  21<L>  |  0.73    Ca    9.4      31 Aug 2020 20:17    TPro  6.4  /  Alb  3.7  /  TBili  0.2  /  DBili  x   /  AST  18  /  ALT  18  /  AlkPhos  81            .Body Fluid Breast Milk  20   Few Staphylococcus epidermidis  --    polymorphonuclear leukocytes seen  Gram Variable Coccobacilli seen  by cytocentrifuge      .Body Fluid Breast Milk  20   Few Staphylococcus epidermidis  --    polymorphonuclear leukocytes seen  No organisms seen  by cytocentrifuge      .Urine Clean Catch (Midstream)  20   <10,000 CFU/mL Normal Urogenital Sasha  --  --      .Blood Blood-Peripheral  20   No growth to date.  --  --      WBC Count: 8.25 (20 @ 06:48)  WBC Count: 7.76 (20 @ 12:44)  WBC Count: 8.88 (20 @ 20:17)      < from: US Transvaginal (20 @ 09:11) >  ******PRELIMINARY REPORT******    ******PRELIMINARY REPORT******          EXAM:  US TRANSVAGINAL                          EXAM:  US DPLX PELVIC                          EXAM:  US PELVIC COMPLETE                            PROCEDURE DATE:  2020      ******PRELIMINARY REPORT******    ******PRELIMINARY REPORT******              INTERPRETATION:  limited study secondary to large amount of air within the endometrial canal. questionable area of increaesd vascularity within the endometrial canal,favored to represent normal uterine tissue over RPOC. recommend continued trend beta hCG and possible f/u ultrasound to ensure no RPOC.  reviewed with radiology attending Dr. Vasquez.  f/u official report in AM.            ******PRELIMINARY REPORT******    ******PRELIMINARY REPORT******          MI HASSAN M.D., RADIOLOGY RESIDENT    < end of copied text >

## 2020-09-02 NOTE — PROGRESS NOTE ADULT - ASSESSMENT
33yo  POD#6 s/p rLTCS EBL 2600 2/2 focal placenta accreta presenting from OBGYN office (Dr. Cota) with concern for retained POC in setting of increased lochia. Pt pregnancy notable for cervical insufficiency s/p Shirodkar cerclage and reported elevated BPs (no baseline HELLP labs). Pt had an uncomplicated postoperative course until this AM when she awoke with heavy vaginal bleeding saturating her pad. She reports that she continued to change pads [id number], requiring 7-8 pads throughout the day. She presented to the office where she underwent an ultrasound that was concerning for retained POC. She was give Cytotec and instructed to present to ED with any symptoms of anemia or continued vaginal bleeding.    In setting of having lightheadedness after appointment, she presented to ED. She denies CP, SOB, dizziness, fevers, dysuria, nausea/vomiting, abdominal or pelvic pain, breast pain. Pt is not breastfeeding or pumping. She reports slight HA but believes its 2/2 dehydration and having a new child at home. +fatigue. Denies vision changes (floaters, blurriness) or RUQ/epigastric pain.    While in ED, BSS showing thin EM stripe. Decision was made to proceed with official ultrasound imaging.   BPs in ED: 152/81 (193), 146/82 (), 138/91 () -> HELLP labs sent. Pt received Tylenol for HA. Reports as feeling pressure behind eyes but unsure if 2/2 crying. Continues to deny vision changes, RUQ/epigastric pain, nausea/vomiting.   142/80 () -> 129/70 () -> 132/69 () -> 132/74 ()    Pt additionally found to be febrile to 38.9C. Decision made to admit for poss endometritis vs mastitis. She reports having a h/o mastitis with a previous pregnancy.   Pt is not breast feeding and is letting the breasts "dry up"    Unclear if patient is actually infected or not. Pt with normal WBC but had fever, this has resolved, ? from the antibiotics  Pt had blood and urine cultured. unclear the significance of the breast milk as the breasts do not seem infected    OB and Radiology are carefully following the uterus for any evidence of retained prodcuts  Await urine cultures and blood cultures- so far NGTD    continue vancomycin/flagyl and aztreonam  Suggest allergy input to see if we could use ertapenem, if we need to do a longer course of antibiotics for now diagnosis is still not clear    follow HCT    follow WBC and temp curve

## 2020-09-02 NOTE — PROGRESS NOTE ADULT - ASSESSMENT
A/P: 35yo POD#8 s/p LTCS a/w elevated BP, fever 2/2 endometritis vs retanied POC vs mastitis POD#6 s/p rLTCS (2600/3400/175) and partial cerclage removal c/b focal accreta and uterine atony. Likely endometritis given the CT A/P & TVUS results.

## 2020-09-02 NOTE — PROGRESS NOTE ADULT - SUBJECTIVE AND OBJECTIVE BOX
OB Progress Note:  Delivery, POD#8    S:  33yo POD#8 s/p LTCS a/w elevated BP, fever 2/2 endometritis vs retanied POC vs mastitis POD#6 s/p rLTCS (2600/3400/175) and partial cerclage removal c/b focal accreta and uterine atony.  Breast tenderness improved after binding. Her pain is well controlled. She is tolerating a regular diet has not yet passed flatus. Denies N/V. Denies CP/SOB/lightheadedness/dizziness.   She is ambulating without difficulty.   Voiding spontaneously  Denies heavy vaginal bleeding.    O:   Vital Signs Last 24 Hrs  T(C): 37.3 (02 Sep 2020 00:11), Max: 37.9 (01 Sep 2020 05:39)  T(F): 99.2 (02 Sep 2020 00:11), Max: 100.2 (01 Sep 2020 05:39)  HR: 85 (02 Sep 2020 00:11) (83 - 93)  BP: 118/80 (02 Sep 2020 00:11) (118/80 - 130/85)  BP(mean): --  RR: 18 (02 Sep 2020 00:11) (17 - 18)  SpO2: 97% (02 Sep 2020 00:11) (97% - 99%)    Labs:  Blood type: A Positive  Rubella IgG: RPR: Negative                          8.3<L>   7.76 >-----------< 325    (  @ 12:44 )             26.3<L>                        7.9<L>   8.88 >-----------< 304    (  @ 20:17 )             25.0<L>    20 @ 20:17      137  |  104  |  8   ----------------------------<  97  4.3   |  21<L>  |  0.73        Ca    9.4      31 Aug 2020 20:17    TPro  6.4  /  Alb  3.7  /  TBili  0.2  /  DBili  x   /  AST  18  /  ALT  18  /  AlkPhos  81  20 @ 20:17    TVUS (): Air within endometrial cavity suggestive of endometritis  CTAP (8/31): Small free fluid & hemorrhage anterior to uterus. Multiple foci of air w/in endometrial canal correlate for endometritis      PE:  General: NAD  Abdomen: Mildly distended, appropriately tender, Fundus firm, incision c/d/i.  VE: No heavy vaginal bleeding  Extremities: No erythema, no pitting edema

## 2020-09-03 ENCOUNTER — APPOINTMENT (OUTPATIENT)
Dept: PAIN MANAGEMENT | Facility: CLINIC | Age: 35
End: 2020-09-03

## 2020-09-03 LAB
ALBUMIN SERPL ELPH-MCNC: 3.5 G/DL — SIGNIFICANT CHANGE UP (ref 3.3–5)
ALP SERPL-CCNC: 84 U/L — SIGNIFICANT CHANGE UP (ref 40–120)
ALT FLD-CCNC: 9 U/L — LOW (ref 10–45)
ANION GAP SERPL CALC-SCNC: 16 MMOL/L — SIGNIFICANT CHANGE UP (ref 5–17)
APTT BLD: 26 SEC — LOW (ref 27.5–35.5)
AST SERPL-CCNC: 12 U/L — SIGNIFICANT CHANGE UP (ref 10–40)
BASOPHILS # BLD AUTO: 0.03 K/UL — SIGNIFICANT CHANGE UP (ref 0–0.2)
BASOPHILS # BLD AUTO: 0.03 K/UL — SIGNIFICANT CHANGE UP (ref 0–0.2)
BASOPHILS NFR BLD AUTO: 0.4 % — SIGNIFICANT CHANGE UP (ref 0–2)
BASOPHILS NFR BLD AUTO: 0.4 % — SIGNIFICANT CHANGE UP (ref 0–2)
BILIRUB SERPL-MCNC: 0.1 MG/DL — LOW (ref 0.2–1.2)
BUN SERPL-MCNC: 6 MG/DL — LOW (ref 7–23)
CALCIUM SERPL-MCNC: 9.4 MG/DL — SIGNIFICANT CHANGE UP (ref 8.4–10.5)
CHLORIDE SERPL-SCNC: 104 MMOL/L — SIGNIFICANT CHANGE UP (ref 96–108)
CO2 SERPL-SCNC: 20 MMOL/L — LOW (ref 22–31)
CREAT SERPL-MCNC: 0.6 MG/DL — SIGNIFICANT CHANGE UP (ref 0.5–1.3)
EOSINOPHIL # BLD AUTO: 0.59 K/UL — HIGH (ref 0–0.5)
EOSINOPHIL # BLD AUTO: 0.74 K/UL — HIGH (ref 0–0.5)
EOSINOPHIL NFR BLD AUTO: 10.9 % — HIGH (ref 0–6)
EOSINOPHIL NFR BLD AUTO: 8.4 % — HIGH (ref 0–6)
FIBRINOGEN PPP-MCNC: 665 MG/DL — HIGH (ref 350–510)
GLUCOSE SERPL-MCNC: 77 MG/DL — SIGNIFICANT CHANGE UP (ref 70–99)
HCT VFR BLD CALC: 26.3 % — LOW (ref 34.5–45)
HCT VFR BLD CALC: 27.7 % — LOW (ref 34.5–45)
HGB BLD-MCNC: 8.3 G/DL — LOW (ref 11.5–15.5)
HGB BLD-MCNC: 8.7 G/DL — LOW (ref 11.5–15.5)
IMM GRANULOCYTES NFR BLD AUTO: 0.9 % — SIGNIFICANT CHANGE UP (ref 0–1.5)
IMM GRANULOCYTES NFR BLD AUTO: 1.1 % — SIGNIFICANT CHANGE UP (ref 0–1.5)
INR BLD: 1.07 RATIO — SIGNIFICANT CHANGE UP (ref 0.88–1.16)
LDH SERPL L TO P-CCNC: 243 U/L — HIGH (ref 50–242)
LYMPHOCYTES # BLD AUTO: 1.28 K/UL — SIGNIFICANT CHANGE UP (ref 1–3.3)
LYMPHOCYTES # BLD AUTO: 1.41 K/UL — SIGNIFICANT CHANGE UP (ref 1–3.3)
LYMPHOCYTES # BLD AUTO: 18.9 % — SIGNIFICANT CHANGE UP (ref 13–44)
LYMPHOCYTES # BLD AUTO: 20 % — SIGNIFICANT CHANGE UP (ref 13–44)
MCHC RBC-ENTMCNC: 31.3 PG — SIGNIFICANT CHANGE UP (ref 27–34)
MCHC RBC-ENTMCNC: 31.4 GM/DL — LOW (ref 32–36)
MCHC RBC-ENTMCNC: 31.6 GM/DL — LOW (ref 32–36)
MCHC RBC-ENTMCNC: 31.6 PG — SIGNIFICANT CHANGE UP (ref 27–34)
MCV RBC AUTO: 100 FL — SIGNIFICANT CHANGE UP (ref 80–100)
MCV RBC AUTO: 99.6 FL — SIGNIFICANT CHANGE UP (ref 80–100)
MONOCYTES # BLD AUTO: 0.44 K/UL — SIGNIFICANT CHANGE UP (ref 0–0.9)
MONOCYTES # BLD AUTO: 0.53 K/UL — SIGNIFICANT CHANGE UP (ref 0–0.9)
MONOCYTES NFR BLD AUTO: 6.5 % — SIGNIFICANT CHANGE UP (ref 2–14)
MONOCYTES NFR BLD AUTO: 7.5 % — SIGNIFICANT CHANGE UP (ref 2–14)
NEUTROPHILS # BLD AUTO: 4.21 K/UL — SIGNIFICANT CHANGE UP (ref 1.8–7.4)
NEUTROPHILS # BLD AUTO: 4.41 K/UL — SIGNIFICANT CHANGE UP (ref 1.8–7.4)
NEUTROPHILS NFR BLD AUTO: 62.4 % — SIGNIFICANT CHANGE UP (ref 43–77)
NEUTROPHILS NFR BLD AUTO: 62.6 % — SIGNIFICANT CHANGE UP (ref 43–77)
NRBC # BLD: 0 /100 WBCS — SIGNIFICANT CHANGE UP (ref 0–0)
NRBC # BLD: 0 /100 WBCS — SIGNIFICANT CHANGE UP (ref 0–0)
PLATELET # BLD AUTO: 346 K/UL — SIGNIFICANT CHANGE UP (ref 150–400)
PLATELET # BLD AUTO: 351 K/UL — SIGNIFICANT CHANGE UP (ref 150–400)
POTASSIUM SERPL-MCNC: 3.9 MMOL/L — SIGNIFICANT CHANGE UP (ref 3.5–5.3)
POTASSIUM SERPL-SCNC: 3.9 MMOL/L — SIGNIFICANT CHANGE UP (ref 3.5–5.3)
PROT SERPL-MCNC: 6.5 G/DL — SIGNIFICANT CHANGE UP (ref 6–8.3)
PROTHROM AB SERPL-ACNC: 12.7 SEC — SIGNIFICANT CHANGE UP (ref 10.6–13.6)
RBC # BLD: 2.63 M/UL — LOW (ref 3.8–5.2)
RBC # BLD: 2.78 M/UL — LOW (ref 3.8–5.2)
RBC # FLD: 13.2 % — SIGNIFICANT CHANGE UP (ref 10.3–14.5)
RBC # FLD: 13.7 % — SIGNIFICANT CHANGE UP (ref 10.3–14.5)
SODIUM SERPL-SCNC: 140 MMOL/L — SIGNIFICANT CHANGE UP (ref 135–145)
URATE SERPL-MCNC: 4.9 MG/DL — SIGNIFICANT CHANGE UP (ref 2.5–7)
VANCOMYCIN TROUGH SERPL-MCNC: 9 UG/ML — LOW (ref 10–20)
WBC # BLD: 6.76 K/UL — SIGNIFICANT CHANGE UP (ref 3.8–10.5)
WBC # BLD: 7.05 K/UL — SIGNIFICANT CHANGE UP (ref 3.8–10.5)
WBC # FLD AUTO: 6.76 K/UL — SIGNIFICANT CHANGE UP (ref 3.8–10.5)
WBC # FLD AUTO: 7.05 K/UL — SIGNIFICANT CHANGE UP (ref 3.8–10.5)

## 2020-09-03 PROCEDURE — 99232 SBSQ HOSP IP/OBS MODERATE 35: CPT

## 2020-09-03 RX ORDER — LOPERAMIDE HCL 2 MG
2 TABLET ORAL ONCE
Refills: 0 | Status: COMPLETED | OUTPATIENT
Start: 2020-09-03 | End: 2020-09-03

## 2020-09-03 RX ADMIN — Medication 2 MILLIGRAM(S): at 14:02

## 2020-09-03 RX ADMIN — Medication 975 MILLIGRAM(S): at 17:07

## 2020-09-03 RX ADMIN — Medication 250 MILLIGRAM(S): at 08:45

## 2020-09-03 RX ADMIN — HEPARIN SODIUM 5000 UNIT(S): 5000 INJECTION INTRAVENOUS; SUBCUTANEOUS at 18:22

## 2020-09-03 RX ADMIN — Medication 100 MILLIGRAM(S): at 13:34

## 2020-09-03 RX ADMIN — Medication 300 MILLIGRAM(S): at 21:31

## 2020-09-03 RX ADMIN — Medication 50 MILLIGRAM(S): at 11:01

## 2020-09-03 RX ADMIN — Medication 500 MILLIGRAM(S): at 11:01

## 2020-09-03 RX ADMIN — HEPARIN SODIUM 5000 UNIT(S): 5000 INJECTION INTRAVENOUS; SUBCUTANEOUS at 06:18

## 2020-09-03 RX ADMIN — Medication 150 MICROGRAM(S): at 06:18

## 2020-09-03 RX ADMIN — Medication 100 MILLIGRAM(S): at 06:17

## 2020-09-03 RX ADMIN — Medication 50 MILLIGRAM(S): at 02:30

## 2020-09-03 RX ADMIN — Medication 975 MILLIGRAM(S): at 16:13

## 2020-09-03 RX ADMIN — Medication 325 MILLIGRAM(S): at 06:18

## 2020-09-03 RX ADMIN — Medication 325 MILLIGRAM(S): at 18:22

## 2020-09-03 NOTE — PROGRESS NOTE ADULT - ASSESSMENT
35yo  POD#6 s/p rLTCS EBL 2600 2/2 focal placenta accreta presenting from OBGYN office (Dr. Cota) with concern for retained POC in setting of increased lochia. Pt pregnancy notable for cervical insufficiency s/p Shirodkar cerclage and reported elevated BPs (no baseline HELLP labs). Pt had an uncomplicated postoperative course until this AM when she awoke with heavy vaginal bleeding saturating her pad. She reports that she continued to change pads [id number], requiring 7-8 pads throughout the day. She presented to the office where she underwent an ultrasound that was concerning for retained POC. She was give Cytotec and instructed to present to ED with any symptoms of anemia or continued vaginal bleeding.    In setting of having lightheadedness after appointment, she presented to ED. She denies CP, SOB, dizziness, fevers, dysuria, nausea/vomiting, abdominal or pelvic pain, breast pain. Pt is not breastfeeding or pumping. She reports slight HA but believes its 2/2 dehydration and having a new child at home. +fatigue. Denies vision changes (floaters, blurriness) or RUQ/epigastric pain.    While in ED, BSS showing thin EM stripe. Decision was made to proceed with official ultrasound imaging.   BPs in ED: 152/81 (193), 146/82 (), 138/91 () -> HELLP labs sent. Pt received Tylenol for HA. Reports as feeling pressure behind eyes but unsure if 2/2 crying. Continues to deny vision changes, RUQ/epigastric pain, nausea/vomiting.   142/80 () -> 129/70 () -> 132/69 () -> 132/74 ()    Pt additionally found to be febrile to 38.9C. Decision made to admit for poss endometritis vs mastitis. She reports having a h/o mastitis with a previous pregnancy.   Pt is not breast feeding and is letting the breasts "dry up"    Unclear if patient is actually infected or not. Pt with normal WBC but had fever, this has resolved, ? from the antibiotics  Pt had blood and urine cultured. unclear the significance of the breast milk as the breasts do not seem infected    OB and Radiology are carefully following the uterus for any evidence of retained prodcuts  Await urine cultures and blood cultures- so far NGTD    was on vancomycin/flagyl and aztreonam and changed to po in anticipation of possible discharge soon    follow HCT    follow WBC and temp curve    consider probiotics    monitor for signs of allergic reaction

## 2020-09-03 NOTE — PROGRESS NOTE ADULT - SUBJECTIVE AND OBJECTIVE BOX
Patient is a 34y old  Female who presents with a chief complaint of   Being followed by ID for        Interval history:  No other acute events      ROS:  No cough,SOB,CP  No N/V/D  No abd pain  No urinary complaints  No HA  No joint or limb pain  No other complaints    PAST MEDICAL & SURGICAL HISTORY:  Migraines  Hypothyroidism  Vaginal delivery  H/O  section: 1x LTCS, 1x CCS    Allergies    aspirin (Anaphylaxis; Hives)  Ceclor (Rash)  penicillins (Hives)    Intolerances      Antimicrobials:    clindamycin   Capsule 300 milliGRAM(s) Oral every 8 hours  levoFLOXacin  Tablet 500 milliGRAM(s) Oral every 24 hours    MEDICATIONS  (STANDING):  ascorbic acid 500 milliGRAM(s) Oral daily  clindamycin   Capsule 300 milliGRAM(s) Oral every 8 hours  ferrous    sulfate 325 milliGRAM(s) Oral two times a day  heparin   Injectable 5000 Unit(s) SubCutaneous every 12 hours  levoFLOXacin  Tablet 500 milliGRAM(s) Oral every 24 hours  levothyroxine 150 MICROGram(s) Oral daily  senna 2 Tablet(s) Oral at bedtime      Vital Signs Last 24 Hrs  T(C): 37.6 (20 @ 14:39), Max: 37.6 (20 @ 14:39)  T(F): 99.7 (20 @ 14:39), Max: 99.7 (20 @ 14:39)  HR: 86 (20 @ 14:39) (70 - 86)  BP: 137/89 (20 @ 14:39) (116/75 - 137/89)  BP(mean): --  RR: 18 (20 @ 14:39) (18 - 18)  SpO2: 98% (20 @ 14:39) (98% - 98%)    Physical Exam:    Constitutional well preserved,comfortable,pleasant    HEENT PERRLA EOMI,No pallor or icterus    No oral exudate or erythema    Neck supple no JVD or LN    Chest Good AE,CTA    CVS RRR S1 S2 WNl No murmur or rub or gallop    Abd soft BS normal No tenderness no masses    Ext No cyanosis clubbing or edema    IV site no erythema tenderness or discharge    Joints no swelling or LOM    CNS AAO X 3 no focal    Lab Data:                          8.3    6.76  )-----------( 351      ( 03 Sep 2020 07:01 )             26.3                   .Body Fluid Breast Milk  20   Few Staphylococcus epidermidis  Few Lactobacillus gasseri "Susceptibilities not performed"  --  Staphylococcus epidermidis  Lactobacillus gasseri      .Body Fluid Breast Milk  20   Few Staphylococcus epidermidis  --  Staphylococcus epidermidis      .Urine Clean Catch (Midstream)  20   <10,000 CFU/mL Normal Urogenital Sasha  --  --      .Blood Blood-Peripheral  20   No growth to date.  --  --        Vancomycin Level, Trough: 9.0 ug/mL (20 @ 07:01)      WBC Count: 6.76 (20 @ 07:01)  WBC Count: 8.25 (20 @ 06:48)  WBC Count: 7.76 (20 @ 12:44)  WBC Count: 8.88 (20 @ 20:17) Patient is a 34y old  Female who presents with a chief complaint of   Being followed by ID for        Interval history:  pt concerned that she might be sent home too quickly  no breast pain  Pt with watery diarrhea earlier, lab wouldn't accept specimen for c diff , so patient took one imodium  No other acute events        PAST MEDICAL & SURGICAL HISTORY:  Migraines  Hypothyroidism  Vaginal delivery  H/O  section: 1x LTCS, 1x CCS    Allergies    aspirin (Anaphylaxis; Hives)  Ceclor (Rash)  penicillins (Hives)    Intolerances      Antimicrobials:    clindamycin   Capsule 300 milliGRAM(s) Oral every 8 hours  levoFLOXacin  Tablet 500 milliGRAM(s) Oral every 24 hours    MEDICATIONS  (STANDING):  ascorbic acid 500 milliGRAM(s) Oral daily  clindamycin   Capsule 300 milliGRAM(s) Oral every 8 hours  ferrous    sulfate 325 milliGRAM(s) Oral two times a day  heparin   Injectable 5000 Unit(s) SubCutaneous every 12 hours  levoFLOXacin  Tablet 500 milliGRAM(s) Oral every 24 hours  levothyroxine 150 MICROGram(s) Oral daily  senna 2 Tablet(s) Oral at bedtime      Vital Signs Last 24 Hrs  T(C): 37.6 (20 @ 14:39), Max: 37.6 (20 @ 14:39)  T(F): 99.7 (20 @ 14:39), Max: 99.7 (20 @ 14:39)  HR: 86 (20 @ 14:39) (70 - 86)  BP: 137/89 (20 @ 14:39) (116/75 - 137/89)  BP(mean): --  RR: 18 (20 @ 14:39) (18 - 18)  SpO2: 98% (20 @ 14:39) (98% - 98%)    Physical Exam:    Constitutional well preserved,comfortable,pleasant    HEENT PERRLA EOMI,No pallor or icterus    No oral exudate or erythema    Neck supple no JVD or LN    Chest Good AE,CTA    CVS RRR S1 S2     Abd soft BS normal No tenderness  incision C/D/I    Ext No cyanosis clubbing or edema    IV site no erythema tenderness or discharge    Joints no swelling or LOM    CNS AAO X 3 no focal    Lab Data:                          8.3    6.76  )-----------( 351      ( 03 Sep 2020 07:01 )             26.3         .Body Fluid Breast Milk  20   Few Staphylococcus epidermidis  Few Lactobacillus gasseri "Susceptibilities not performed"  --  Staphylococcus epidermidis  Lactobacillus gasseri      .Body Fluid Breast Milk  20   Few Staphylococcus epidermidis  --  Staphylococcus epidermidis      .Urine Clean Catch (Midstream)  20   <10,000 CFU/mL Normal Urogenital Sasha  --  --      .Blood Blood-Peripheral  20   No growth to date.  --  --        Vancomycin Level, Trough: 9.0 ug/mL (20 @ 07:01)      WBC Count: 6.76 (20 @ 07:01)  WBC Count: 8.25 (20 @ 06:48)  WBC Count: 7.76 (20 @ 12:44)  WBC Count: 8.88 (20 @ 20:17)

## 2020-09-03 NOTE — PROGRESS NOTE ADULT - ATTENDING COMMENTS
pt seen and examined. discussed hospital course with patient. ultrasound with no retained products of conception. ID following and transitioned to oral antibiotics today. wbc nl. h/h stable. VSS. patient feeling well. will observe on oral antibiotics and evaluate for discharge tomorrow.    AYDEE Bucio

## 2020-09-03 NOTE — PROGRESS NOTE ADULT - ASSESSMENT
A/P: s/p LTCS a/w elevated BP, fever 2/2 endometritis vs retanied POC vs mastitis POD#6 s/p rLTCS (2600/3400/175) and partial cerclage removal c/b focal accreta and uterine atony.   Bleeding improved with minimal tenderness. VS stable. Patient doing well.

## 2020-09-03 NOTE — PROGRESS NOTE ADULT - SUBJECTIVE AND OBJECTIVE BOX
OB Progress Note:  Delivery, POD#9    S: 33yo POD#9 s/p LTCS a/w elevated BP, fever 2/2 endometritis vs retanied POC vs mastitis POD#6 s/p rLTCS (2600/3400/175) and partial cerclage removal c/b focal accreta and uterine atony. Breast/abdominal tenderness improved. Her pain is well controlled. She is tolerating a regular diet has not yet passed flatus. Denies N/V. Denies CP/SOB/lightheadedness/dizziness.   She is ambulating without difficulty.   Voiding spontaneously  Denies heavy vaginal bleeding.    O:   Vital Signs Last 24 Hrs  T(C): 36.9 (03 Sep 2020 00:34), Max: 37.2 (02 Sep 2020 13:22)  T(F): 98.4 (03 Sep 2020 00:34), Max: 99 (02 Sep 2020 13:22)  HR: 70 (03 Sep 2020 00:34) (70 - 87)  BP: 128/89 (03 Sep 2020 00:34) (116/75 - 132/89)  BP(mean): --  RR: 18 (03 Sep 2020 00:34) (18 - 18)  SpO2: 98% (03 Sep 2020 00:34) (95% - 98%)    Labs:  Blood type: A Positive  Rubella IgG: RPR: Negative                          8.5<L>   8.25 >-----------< 364    (  @ 06:48 )             27.7<L>                        8.3<L>   7.76 >-----------< 325    (  @ 12:44 )             26.3<L>                        7.9<L>   8.88 >-----------< 304    (  @ 20:17 )             25.0<L>    20 @ 20:17      137  |  104  |  8   ----------------------------<  97  4.3   |  21<L>  |  0.73        Ca    9.4      31 Aug 2020 20:17    TPro  6.4  /  Alb  3.7  /  TBili  0.2  /  DBili  x   /  AST  18  /  ALT  18  /  AlkPhos  81  20 @ 20:17          PE:  General: NAD  Abdomen: Mildly distended, appropriately tender, Fundus firm, incision c/d/i.  VE: No heavy vaginal bleeding noted on pad, last changed last night  Extremities: No erythema, no pitting edema

## 2020-09-04 ENCOUNTER — FORM ENCOUNTER (OUTPATIENT)
Age: 35
End: 2020-09-04

## 2020-09-04 ENCOUNTER — TRANSCRIPTION ENCOUNTER (OUTPATIENT)
Age: 35
End: 2020-09-04

## 2020-09-04 VITALS
DIASTOLIC BLOOD PRESSURE: 72 MMHG | OXYGEN SATURATION: 98 % | TEMPERATURE: 99 F | SYSTOLIC BLOOD PRESSURE: 120 MMHG | RESPIRATION RATE: 18 BRPM | HEART RATE: 96 BPM

## 2020-09-04 LAB
ALBUMIN SERPL ELPH-MCNC: 3.6 G/DL — SIGNIFICANT CHANGE UP (ref 3.3–5)
ALP SERPL-CCNC: 84 U/L — SIGNIFICANT CHANGE UP (ref 40–120)
ALT FLD-CCNC: 7 U/L — LOW (ref 10–45)
ANION GAP SERPL CALC-SCNC: 14 MMOL/L — SIGNIFICANT CHANGE UP (ref 5–17)
APTT BLD: 29.7 SEC — SIGNIFICANT CHANGE UP (ref 27.5–35.5)
AST SERPL-CCNC: 10 U/L — SIGNIFICANT CHANGE UP (ref 10–40)
BASOPHILS # BLD AUTO: 0.03 K/UL — SIGNIFICANT CHANGE UP (ref 0–0.2)
BASOPHILS NFR BLD AUTO: 0.4 % — SIGNIFICANT CHANGE UP (ref 0–2)
BILIRUB SERPL-MCNC: 0.2 MG/DL — SIGNIFICANT CHANGE UP (ref 0.2–1.2)
BUN SERPL-MCNC: 8 MG/DL — SIGNIFICANT CHANGE UP (ref 7–23)
CALCIUM SERPL-MCNC: 9.5 MG/DL — SIGNIFICANT CHANGE UP (ref 8.4–10.5)
CHLORIDE SERPL-SCNC: 104 MMOL/L — SIGNIFICANT CHANGE UP (ref 96–108)
CO2 SERPL-SCNC: 23 MMOL/L — SIGNIFICANT CHANGE UP (ref 22–31)
CREAT SERPL-MCNC: 0.76 MG/DL — SIGNIFICANT CHANGE UP (ref 0.5–1.3)
EOSINOPHIL # BLD AUTO: 0.59 K/UL — HIGH (ref 0–0.5)
EOSINOPHIL NFR BLD AUTO: 8.7 % — HIGH (ref 0–6)
FIBRINOGEN PPP-MCNC: 688 MG/DL — HIGH (ref 350–510)
GLUCOSE SERPL-MCNC: 85 MG/DL — SIGNIFICANT CHANGE UP (ref 70–99)
HCT VFR BLD CALC: 28.2 % — LOW (ref 34.5–45)
HGB BLD-MCNC: 8.9 G/DL — LOW (ref 11.5–15.5)
IMM GRANULOCYTES NFR BLD AUTO: 1.3 % — SIGNIFICANT CHANGE UP (ref 0–1.5)
INR BLD: 1.17 RATIO — HIGH (ref 0.88–1.16)
LDH SERPL L TO P-CCNC: 226 U/L — SIGNIFICANT CHANGE UP (ref 50–242)
LYMPHOCYTES # BLD AUTO: 1.23 K/UL — SIGNIFICANT CHANGE UP (ref 1–3.3)
LYMPHOCYTES # BLD AUTO: 18.1 % — SIGNIFICANT CHANGE UP (ref 13–44)
MCHC RBC-ENTMCNC: 30.9 PG — SIGNIFICANT CHANGE UP (ref 27–34)
MCHC RBC-ENTMCNC: 31.6 GM/DL — LOW (ref 32–36)
MCV RBC AUTO: 97.9 FL — SIGNIFICANT CHANGE UP (ref 80–100)
MONOCYTES # BLD AUTO: 0.55 K/UL — SIGNIFICANT CHANGE UP (ref 0–0.9)
MONOCYTES NFR BLD AUTO: 8.1 % — SIGNIFICANT CHANGE UP (ref 2–14)
NEUTROPHILS # BLD AUTO: 4.31 K/UL — SIGNIFICANT CHANGE UP (ref 1.8–7.4)
NEUTROPHILS NFR BLD AUTO: 63.4 % — SIGNIFICANT CHANGE UP (ref 43–77)
NRBC # BLD: 0 /100 WBCS — SIGNIFICANT CHANGE UP (ref 0–0)
PLATELET # BLD AUTO: 332 K/UL — SIGNIFICANT CHANGE UP (ref 150–400)
POTASSIUM SERPL-MCNC: 4.4 MMOL/L — SIGNIFICANT CHANGE UP (ref 3.5–5.3)
POTASSIUM SERPL-SCNC: 4.4 MMOL/L — SIGNIFICANT CHANGE UP (ref 3.5–5.3)
PROT SERPL-MCNC: 6.4 G/DL — SIGNIFICANT CHANGE UP (ref 6–8.3)
PROTHROM AB SERPL-ACNC: 13.8 SEC — HIGH (ref 10.6–13.6)
RBC # BLD: 2.88 M/UL — LOW (ref 3.8–5.2)
RBC # FLD: 13.3 % — SIGNIFICANT CHANGE UP (ref 10.3–14.5)
SODIUM SERPL-SCNC: 141 MMOL/L — SIGNIFICANT CHANGE UP (ref 135–145)
URATE SERPL-MCNC: 5.5 MG/DL — SIGNIFICANT CHANGE UP (ref 2.5–7)
WBC # BLD: 6.8 K/UL — SIGNIFICANT CHANGE UP (ref 3.8–10.5)
WBC # FLD AUTO: 6.8 K/UL — SIGNIFICANT CHANGE UP (ref 3.8–10.5)

## 2020-09-04 PROCEDURE — 99238 HOSP IP/OBS DSCHRG MGMT 30/<: CPT

## 2020-09-04 PROCEDURE — 84295 ASSAY OF SERUM SODIUM: CPT

## 2020-09-04 PROCEDURE — 85027 COMPLETE CBC AUTOMATED: CPT

## 2020-09-04 PROCEDURE — 36415 COLL VENOUS BLD VENIPUNCTURE: CPT

## 2020-09-04 PROCEDURE — 93975 VASCULAR STUDY: CPT

## 2020-09-04 PROCEDURE — 87040 BLOOD CULTURE FOR BACTERIA: CPT

## 2020-09-04 PROCEDURE — 80053 COMPREHEN METABOLIC PANEL: CPT

## 2020-09-04 PROCEDURE — 84156 ASSAY OF PROTEIN URINE: CPT

## 2020-09-04 PROCEDURE — 87186 SC STD MICRODIL/AGAR DIL: CPT

## 2020-09-04 PROCEDURE — 84132 ASSAY OF SERUM POTASSIUM: CPT

## 2020-09-04 PROCEDURE — 76856 US EXAM PELVIC COMPLETE: CPT

## 2020-09-04 PROCEDURE — 83615 LACTATE (LD) (LDH) ENZYME: CPT

## 2020-09-04 PROCEDURE — 82435 ASSAY OF BLOOD CHLORIDE: CPT

## 2020-09-04 PROCEDURE — 87205 SMEAR GRAM STAIN: CPT

## 2020-09-04 PROCEDURE — 85014 HEMATOCRIT: CPT

## 2020-09-04 PROCEDURE — 87075 CULTR BACTERIA EXCEPT BLOOD: CPT

## 2020-09-04 PROCEDURE — 82330 ASSAY OF CALCIUM: CPT

## 2020-09-04 PROCEDURE — 87086 URINE CULTURE/COLONY COUNT: CPT

## 2020-09-04 PROCEDURE — 85384 FIBRINOGEN ACTIVITY: CPT

## 2020-09-04 PROCEDURE — 74177 CT ABD & PELVIS W/CONTRAST: CPT

## 2020-09-04 PROCEDURE — 84550 ASSAY OF BLOOD/URIC ACID: CPT

## 2020-09-04 PROCEDURE — 86850 RBC ANTIBODY SCREEN: CPT

## 2020-09-04 PROCEDURE — 82803 BLOOD GASES ANY COMBINATION: CPT

## 2020-09-04 PROCEDURE — 85730 THROMBOPLASTIN TIME PARTIAL: CPT

## 2020-09-04 PROCEDURE — 99285 EMERGENCY DEPT VISIT HI MDM: CPT | Mod: 25

## 2020-09-04 PROCEDURE — 99232 SBSQ HOSP IP/OBS MODERATE 35: CPT

## 2020-09-04 PROCEDURE — 86901 BLOOD TYPING SEROLOGIC RH(D): CPT

## 2020-09-04 PROCEDURE — 80202 ASSAY OF VANCOMYCIN: CPT

## 2020-09-04 PROCEDURE — 86900 BLOOD TYPING SEROLOGIC ABO: CPT

## 2020-09-04 PROCEDURE — 76830 TRANSVAGINAL US NON-OB: CPT

## 2020-09-04 PROCEDURE — 87070 CULTURE OTHR SPECIMN AEROBIC: CPT

## 2020-09-04 PROCEDURE — 85610 PROTHROMBIN TIME: CPT

## 2020-09-04 PROCEDURE — 85018 HEMOGLOBIN: CPT

## 2020-09-04 PROCEDURE — 83605 ASSAY OF LACTIC ACID: CPT

## 2020-09-04 PROCEDURE — 82570 ASSAY OF URINE CREATININE: CPT

## 2020-09-04 PROCEDURE — 82947 ASSAY GLUCOSE BLOOD QUANT: CPT

## 2020-09-04 RX ORDER — DOCUSATE SODIUM 100 MG
1 CAPSULE ORAL
Qty: 0 | Refills: 0 | DISCHARGE

## 2020-09-04 RX ADMIN — Medication 150 MICROGRAM(S): at 06:03

## 2020-09-04 RX ADMIN — Medication 25 MILLIGRAM(S): at 00:55

## 2020-09-04 RX ADMIN — HEPARIN SODIUM 5000 UNIT(S): 5000 INJECTION INTRAVENOUS; SUBCUTANEOUS at 06:03

## 2020-09-04 RX ADMIN — Medication 975 MILLIGRAM(S): at 12:19

## 2020-09-04 RX ADMIN — Medication 300 MILLIGRAM(S): at 14:19

## 2020-09-04 RX ADMIN — Medication 500 MILLIGRAM(S): at 12:19

## 2020-09-04 RX ADMIN — Medication 300 MILLIGRAM(S): at 06:03

## 2020-09-04 RX ADMIN — Medication 325 MILLIGRAM(S): at 06:03

## 2020-09-04 NOTE — DISCHARGE NOTE PROVIDER - NSDCFUSCHEDAPPT_GEN_ALL_CORE_FT
JYOTSNA ACKERMAN ; 09/08/2020 ; NPP OB/GYN  [facility]  JYOTSNA ACKERMAN ; 10/22/2020 ; NPP Neuro Pain [facility]

## 2020-09-04 NOTE — PROGRESS NOTE ADULT - SUBJECTIVE AND OBJECTIVE BOX
OB Progress Note:  Delivery, POD#10    S: 35yo POD#10 s/p LTCS a/w elevated BP, fever 2/2 endometritis vs retanied POC vs mastitis POD#6 s/p rLTCS (2600/3400/175) and partial cerclage removal c/b focal accreta and uterine atony. Pain has resolved. Did not change pad overnight She is tolerating a regular diet has not yet passed flatus. Denies N/V. Denies CP/SOB/lightheadedness/dizziness.   She is ambulating without difficulty.   Voiding spontaneously  Denies heavy vaginal bleeding.    O:   Vital Signs Last 24 Hrs  T(C): 36.9 (04 Sep 2020 00:38), Max: 37.6 (03 Sep 2020 14:39)  T(F): 98.4 (04 Sep 2020 00:38), Max: 99.7 (03 Sep 2020 14:39)  HR: 85 (04 Sep 2020 00:38) (73 - 94)  BP: 133/86 (04 Sep 2020 00:38) (132/88 - 139/92)  BP(mean): --  RR: 18 (04 Sep 2020 00:38) (18 - 20)  SpO2: 97% (04 Sep 2020 00:38) (97% - 99%)    Labs:  Blood type: A Positive  Rubella IgG: RPR: Negative                          8.7<L>   7.05 >-----------< 346    (  @ 19:38 )             27.7<L>                        8.3<L>   6.76 >-----------< 351    (  @ 07:01 )             26.3<L>                        8.5<L>   8.25 >-----------< 364    (  @ 06:48 )             27.7<L>                        8.3<L>   7.76 >-----------< 325    (  @ 12:44 )             26.3<L>    - @ 19:38      140  |  104  |  6<L>  ----------------------------<  77  3.9   |  20<L>  |  0.60        Ca    9.4      03 Sep 2020 19:38    TPro  6.5  /  Alb  3.5  /  TBili  0.1<L>  /  DBili  x   /  AST  12  /  ALT  9<L>  /  AlkPhos  84  20 @ 19:38        PE:  General: NAD  Abdomen: Mildly distended, appropriately tender, Fundus firm, incision c/d/i.  VE: No heavy vaginal bleeding  Extremities: No erythema, no pitting edema OB Progress Note:  Delivery, POD#10    S: 35yo POD#10 s/p LTCS a/w elevated BP, fever 2/2 endometritis vs retanied POC vs mastitis POD#6 s/p rLTCS (2600/3400/175) and partial cerclage removal c/b focal accreta and uterine atony. Pain has resolved. Did not change pad overnight She is tolerating a regular diet has not yet passed flatus. Denies N/V. Denies CP/SOB/lightheadedness/dizziness.   She is ambulating without difficulty.   Voiding spontaneously  Denies heavy vaginal bleeding.    O:   Vital Signs Last 24 Hrs  T(C): 36.9 (04 Sep 2020 00:38), Max: 37.6 (03 Sep 2020 14:39)  T(F): 98.4 (04 Sep 2020 00:38), Max: 99.7 (03 Sep 2020 14:39)  HR: 85 (04 Sep 2020 00:38) (73 - 94)  BP: 133/86 (04 Sep 2020 00:38) (132/88 - 139/92)  BP(mean): --  RR: 18 (04 Sep 2020 00:38) (18 - 20)  SpO2: 97% (04 Sep 2020 00:38) (97% - 99%)    Labs:  Blood type: A Positive  Rubella IgG: RPR: Negative                        8.9    6.80  )-----------( 332      ( 04 Sep 2020 06:55 )             28.2                           8.7<L>   7.05 >-----------< 346    (  @ 19:38 )             27.7<L>                        8.3<L>   6.76 >-----------< 351    (  @ 07:01 )             26.3<L>                        8.5<L>   8.25 >-----------< 364    (  @ 06:48 )             27.7<L>                        8.3<L>   7.76 >-----------< 325    (  @ 12:44 )             26.3<L>    - @ 19:38      140  |  104  |  6<L>  ----------------------------<  77  3.9   |  20<L>  |  0.60        Ca    9.4      03 Sep 2020 19:38    TPro  6.5  /  Alb  3.5  /  TBili  0.1<L>  /  DBili  x   /  AST  12  /  ALT  9<L>  /  AlkPhos  84  20 @ 19:38        PE:  General: NAD  Abdomen: Mildly distended, appropriately tender, Fundus firm, incision c/d/i.  VE: No heavy vaginal bleeding  Extremities: No erythema, no pitting edema

## 2020-09-04 NOTE — DISCHARGE NOTE NURSING/CASE MANAGEMENT/SOCIAL WORK - PATIENT PORTAL LINK FT
You can access the FollowMyHealth Patient Portal offered by [facility] by registering at the following website: [email or web address]. By joining [employer]’s FollowMyHealth portal, you will also be able to view your health information using other applications (apps) compatible with our system.

## 2020-09-04 NOTE — DISCHARGE NOTE NURSING/CASE MANAGEMENT/SOCIAL WORK - NSDCPNINST_GEN_ALL_CORE
Call MD for any elevated temps, chills , body aches, heavy vaginal bleeding , chest pain, shortness of breath, difficulties tolerating medicatiions

## 2020-09-04 NOTE — DISCHARGE NOTE PROVIDER - CARE PROVIDER_API CALL
Cherri Dow)  OBSN  General  [address] [place] [address], [address] [id number]  Phone: [phone]  Fax: [phone]  Follow Up Time:

## 2020-09-04 NOTE — DISCHARGE NOTE PROVIDER - NSDCMRMEDTOKEN_GEN_ALL_CORE_FT
acetaminophen 325 mg oral tablet: 3 tab(s) orally every 6 hours, As Needed  clindamycin 300 mg oral capsule: 1 cap(s) orally every 8 hours   ibuprofen 600 mg oral tablet: 1 tab(s) orally every 6 hours, As Needed  levoFLOXacin 500 mg oral tablet: 1 tab(s) orally once a day

## 2020-09-04 NOTE — DISCHARGE NOTE NURSING/CASE MANAGEMENT/SOCIAL WORK - NSDCPETBCESMAN_GEN_ALL_CORE
If you are a smoker, it is important for your health to stop smoking. Please be aware that second hand smoke is also harmful. 109

## 2020-09-04 NOTE — PROGRESS NOTE ADULT - PROBLEM SELECTOR PLAN 1
-VS  -AM HELLP Labs, previous wnl. Down-trending WBC  -TVUS suggesting endometritis  -ID: Levequin/Clinda  -F/U BCx x2 (8/31)- NGTD  -UCx Neg    Erich Orellana PGY-3
- Continue motrin, tylenol, oxycodone PRN for pain control.  -f/u final reads on TVUS/CTAP  -ID Consult in AM   -Continue Aztreonam   -f/u AM CBC w/ diff  -CT Head if headache persists  -f/u BCx/UCx  -Monitor VS  -Continue HSQ    Erich Orellana PGY-3
- Continue regular diet.  - Increase ambulation.  - Continue motrin, tylenol, oxycodone PRN for pain control.   -VS  -Daily CBC w diff  -F/U TVUS(f)  -ID: Aztreonam, Vanc, Flagyl  -F/U BCx x2 (8/31), UCx (8/31)  -f/u final TVUS read    Erich Orellana PGY-3
- Increase ambulation.  - Continue motrin, tylenol, oxycodone PRN for pain control.   -VS  -Daily CBC w diff  -F/U TVUS(f)  -ID: Aztreonam, Vanc, Flagyl, appreciate Recs  -F/U BCx x2 (8/31)- NGTD  -UCx Neg  -f/u final TVUS read    Erich Orellana PGY-3

## 2020-09-04 NOTE — DISCHARGE NOTE PROVIDER - HOSPITAL COURSE
Patient is a 34 yhear old  admitted with elevated fevers/BP, secondary to endometritis, POD10 status post rLTCS and cerclage removal. CD with partial acreta. TVUS and CT performed showing endometritis. Mild range BPs, with normal HELLP labs. BldCx, NGTD. Patient seen by ID with plans for home PO Cipro/flagyl. Afebrile x 72 hours on discharge.

## 2020-09-04 NOTE — PROGRESS NOTE ADULT - ASSESSMENT
35yo  POD#6 s/p rLTCS EBL 2600 2/2 focal placenta accreta presenting from OBGYN office (Dr. Cota) with concern for retained POC in setting of increased lochia. Pt pregnancy notable for cervical insufficiency s/p Shirodkar cerclage and reported elevated BPs (no baseline HELLP labs). Pt had an uncomplicated postoperative course until this AM when she awoke with heavy vaginal bleeding saturating her pad. She reports that she continued to change pads [id number], requiring 7-8 pads throughout the day. She presented to the office where she underwent an ultrasound that was concerning for retained POC. She was give Cytotec and instructed to present to ED with any symptoms of anemia or continued vaginal bleeding.    In setting of having lightheadedness after appointment, she presented to ED. She denies CP, SOB, dizziness, fevers, dysuria, nausea/vomiting, abdominal or pelvic pain, breast pain. Pt is not breastfeeding or pumping. She reports slight HA but believes its 2/2 dehydration and having a new child at home. +fatigue. Denies vision changes (floaters, blurriness) or RUQ/epigastric pain.    While in ED, BSS showing thin EM stripe. Decision was made to proceed with official ultrasound imaging.   BPs in ED: 152/81 (193), 146/82 (), 138/91 () -> HELLP labs sent. Pt received Tylenol for HA. Reports as feeling pressure behind eyes but unsure if 2/2 crying. Continues to deny vision changes, RUQ/epigastric pain, nausea/vomiting.   142/80 () -> 129/70 () -> 132/69 () -> 132/74 ()    Pt additionally found to be febrile to 38.9C. Decision made to admit for poss endometritis vs mastitis. She reports having a h/o mastitis with a previous pregnancy.   Pt is not breast feeding and is letting the breasts "dry up"    Unclear if patient is actually infected or not. Pt with normal WBC but had fever, this has resolved, ? from the antibiotics  Pt had blood and urine cultured. unclear the significance of the breast milk as the breasts do not seem infected    OB and Radiology are carefully following the uterus for any evidence of retained prodcuts  Await urine cultures and blood cultures- so far NGTD    was on vancomycin/flagyl and aztreonam and changed to po , tolerating and will d/c today to complete ab course  pt advised to take probiotic     will follow up with GYN

## 2020-09-04 NOTE — PROGRESS NOTE ADULT - ATTENDING COMMENTS
Patient seen at bedside, plan of care discussed at length. Has been afebrile >24 hours, no leukocytosis. seen by ID with reccs for home ciprofloxacin, Levaquin x 7 days. Patient BPs well controlled. labs reviewed. Has an appointment to see me Tuesday. Discussed plan of care, patient in agreement with plan. all questions answered. given precautions of when to Call the office.

## 2020-09-04 NOTE — PROGRESS NOTE ADULT - ASSESSMENT
A/P: s/p LTCS a/w elevated BP, fever 2/2 endometritis vs retanied POC vs mastitis POD#6 s/p rLTCS (2600/3400/175) and partial cerclage removal c/b focal accreta and uterine atony.  Bleeding improved with no tenderness. Patient afebrile overnight w/ PO antibiotics. She had mild range BP. HELLP Labs wnl. VS stable. Patient doing well.

## 2020-09-04 NOTE — PROGRESS NOTE ADULT - SUBJECTIVE AND OBJECTIVE BOX
Patient is a 34y old  Female who presents with a chief complaint of vaginal bleeding (04 Sep 2020 10:11)    Being followed by ID for        Interval history:  pt seen  for discharge today  only one bowel mvmt, soft  no urinary sxs  feeling better  vaginal bleeding is less  No other acute events        PAST MEDICAL & SURGICAL HISTORY:  Migraines  Hypothyroidism  Vaginal delivery  H/O  section: 1x LTCS, 1x CCS    Allergies    aspirin (Anaphylaxis; Hives)  Ceclor (Rash)  penicillins (Hives)    Intolerances      Antimicrobials:    clindamycin   Capsule 300 milliGRAM(s) Oral every 8 hours  levoFLOXacin  Tablet 500 milliGRAM(s) Oral every 24 hours    MEDICATIONS  (STANDING):  ascorbic acid 500 milliGRAM(s) Oral daily  clindamycin   Capsule 300 milliGRAM(s) Oral every 8 hours  ferrous    sulfate 325 milliGRAM(s) Oral two times a day  heparin   Injectable 5000 Unit(s) SubCutaneous every 12 hours  levoFLOXacin  Tablet 500 milliGRAM(s) Oral every 24 hours  levothyroxine 150 MICROGram(s) Oral daily  senna 2 Tablet(s) Oral at bedtime      Vital Signs Last 24 Hrs  T(C): 37.4 (20 @ 13:42), Max: 37.4 (20 @ 13:42)  T(F): 99.3 (20 @ 13:42), Max: 99.3 (20 @ 13:42)  HR: 96 (20 @ 13:42) (85 - 96)  BP: 120/72 (20 @ 13:42) (120/72 - 139/92)  BP(mean): --  RR: 18 (20 @ 13:42) (18 - 20)  SpO2: 98% (20 @ 13:42) (97% - 99%)    Physical Exam:    Constitutional well preserved,comfortable,pleasant    HEENT PERRLA EOMI,No pallor or icterus    No oral exudate or erythema    Neck supple no JVD or LN    Chest Good AE,CTA    CVS RRR S1 S2     Abd soft BS normal No tenderness  c -section well healed    Ext No cyanosis clubbing or edema    IV site no erythema tenderness or discharge    Joints no swelling or LOM    CNS AAO X 3 no focal    Lab Data:                          8.9    6.80  )-----------( 332      ( 04 Sep 2020 06:55 )             28.2           141  |  104  |  8   ----------------------------<  85  4.4   |  23  |  0.76    Ca    9.5      04 Sep 2020 06:55    TPro  6.4  /  Alb  3.6  /  TBili  0.2  /  DBili  x   /  AST  10  /  ALT  7<L>  /  AlkPhos  84            .Body Fluid Breast Milk  20   Few Staphylococcus epidermidis  Few Lactobacillus gasseri "Susceptibilities not performed"  --  Staphylococcus epidermidis  Lactobacillus gasseri      .Body Fluid Breast Milk  20   Few Staphylococcus epidermidis  --  Staphylococcus epidermidis      .Urine Clean Catch (Midstream)  20   <10,000 CFU/mL Normal Urogenital Sasha  --  --      .Blood Blood-Peripheral  20   No growth to date.  --  --                Vancomycin Level, Trough: 9.0 ug/mL (20 @ 07:01)      WBC Count: 6.80 (20 @ 06:55)  WBC Count: 7.05 (20 @ 19:38)  WBC Count: 6.76 (20 @ 07:01)  WBC Count: 8.25 (20 @ 06:48)  WBC Count: 7.76 (20 @ 12:44)  WBC Count: 8.88 (20 @ 20:17)

## 2020-09-05 LAB
CULTURE RESULTS: SIGNIFICANT CHANGE UP
CULTURE RESULTS: SIGNIFICANT CHANGE UP
ORGANISM # SPEC MICROSCOPIC CNT: SIGNIFICANT CHANGE UP
SPECIMEN SOURCE: SIGNIFICANT CHANGE UP
SPECIMEN SOURCE: SIGNIFICANT CHANGE UP

## 2020-09-06 LAB
CULTURE RESULTS: SIGNIFICANT CHANGE UP
CULTURE RESULTS: SIGNIFICANT CHANGE UP
SPECIMEN SOURCE: SIGNIFICANT CHANGE UP
SPECIMEN SOURCE: SIGNIFICANT CHANGE UP

## 2020-09-08 ENCOUNTER — APPOINTMENT (OUTPATIENT)
Dept: OBGYN | Facility: CLINIC | Age: 35
End: 2020-09-08
Payer: COMMERCIAL

## 2020-09-08 LAB — SURGICAL PATHOLOGY STUDY: SIGNIFICANT CHANGE UP

## 2020-09-08 PROCEDURE — 0502F SUBSEQUENT PRENATAL CARE: CPT

## 2020-09-09 PROBLEM — E03.9 HYPOTHYROIDISM, UNSPECIFIED: Chronic | Status: ACTIVE | Noted: 2020-08-31

## 2020-09-09 PROBLEM — G43.909 MIGRAINE, UNSPECIFIED, NOT INTRACTABLE, WITHOUT STATUS MIGRAINOSUS: Chronic | Status: ACTIVE | Noted: 2020-08-31

## 2020-09-10 ENCOUNTER — INPATIENT (INPATIENT)
Facility: HOSPITAL | Age: 35
LOS: 2 days | Discharge: HOME | DRG: 776 | End: 2020-09-13
Attending: INTERNAL MEDICINE | Admitting: INTERNAL MEDICINE
Payer: COMMERCIAL

## 2020-09-10 VITALS
RESPIRATION RATE: 18 BRPM | WEIGHT: 169.98 LBS | SYSTOLIC BLOOD PRESSURE: 112 MMHG | OXYGEN SATURATION: 97 % | HEIGHT: 63 IN | HEART RATE: 115 BPM | TEMPERATURE: 99 F | DIASTOLIC BLOOD PRESSURE: 75 MMHG

## 2020-09-10 DIAGNOSIS — I26.99 OTHER PULMONARY EMBOLISM WITHOUT ACUTE COR PULMONALE: ICD-10-CM

## 2020-09-10 DIAGNOSIS — N71.9 INFLAMMATORY DISEASE OF UTERUS, UNSPECIFIED: ICD-10-CM

## 2020-09-10 DIAGNOSIS — Z98.891 HISTORY OF UTERINE SCAR FROM PREVIOUS SURGERY: Chronic | ICD-10-CM

## 2020-09-10 DIAGNOSIS — G43.009 MIGRAINE WITHOUT AURA, NOT INTRACTABLE, WITHOUT STATUS MIGRAINOSUS: ICD-10-CM

## 2020-09-10 LAB
ALBUMIN SERPL ELPH-MCNC: 3.9 G/DL — SIGNIFICANT CHANGE UP (ref 3.3–5)
ALP SERPL-CCNC: 98 U/L — SIGNIFICANT CHANGE UP (ref 40–120)
ALT FLD-CCNC: 6 U/L — LOW (ref 10–45)
ANION GAP SERPL CALC-SCNC: 15 MMOL/L — SIGNIFICANT CHANGE UP (ref 5–17)
APTT BLD: 28.4 SEC — SIGNIFICANT CHANGE UP (ref 27.5–35.5)
AST SERPL-CCNC: 12 U/L — SIGNIFICANT CHANGE UP (ref 10–40)
BASOPHILS # BLD AUTO: 0.04 K/UL — SIGNIFICANT CHANGE UP (ref 0–0.2)
BASOPHILS NFR BLD AUTO: 0.6 % — SIGNIFICANT CHANGE UP (ref 0–2)
BILIRUB SERPL-MCNC: 0.2 MG/DL — SIGNIFICANT CHANGE UP (ref 0.2–1.2)
BLD GP AB SCN SERPL QL: NEGATIVE — SIGNIFICANT CHANGE UP
BUN SERPL-MCNC: 8 MG/DL — SIGNIFICANT CHANGE UP (ref 7–23)
CALCIUM SERPL-MCNC: 9.2 MG/DL — SIGNIFICANT CHANGE UP (ref 8.4–10.5)
CHLORIDE SERPL-SCNC: 104 MMOL/L — SIGNIFICANT CHANGE UP (ref 96–108)
CO2 SERPL-SCNC: 21 MMOL/L — LOW (ref 22–31)
CREAT SERPL-MCNC: 0.77 MG/DL — SIGNIFICANT CHANGE UP (ref 0.5–1.3)
EOSINOPHIL # BLD AUTO: 0.45 K/UL — SIGNIFICANT CHANGE UP (ref 0–0.5)
EOSINOPHIL NFR BLD AUTO: 6.6 % — HIGH (ref 0–6)
GLUCOSE SERPL-MCNC: 87 MG/DL — SIGNIFICANT CHANGE UP (ref 70–99)
HCT VFR BLD CALC: 29 % — LOW (ref 34.5–45)
HGB BLD-MCNC: 9.2 G/DL — LOW (ref 11.5–15.5)
IMM GRANULOCYTES NFR BLD AUTO: 1 % — SIGNIFICANT CHANGE UP (ref 0–1.5)
INR BLD: 1.22 RATIO — HIGH (ref 0.88–1.16)
LYMPHOCYTES # BLD AUTO: 1.15 K/UL — SIGNIFICANT CHANGE UP (ref 1–3.3)
LYMPHOCYTES # BLD AUTO: 17 % — SIGNIFICANT CHANGE UP (ref 13–44)
MAGNESIUM SERPL-MCNC: 2 MG/DL — SIGNIFICANT CHANGE UP (ref 1.6–2.6)
MCHC RBC-ENTMCNC: 30.3 PG — SIGNIFICANT CHANGE UP (ref 27–34)
MCHC RBC-ENTMCNC: 31.7 GM/DL — LOW (ref 32–36)
MCV RBC AUTO: 95.4 FL — SIGNIFICANT CHANGE UP (ref 80–100)
MONOCYTES # BLD AUTO: 0.51 K/UL — SIGNIFICANT CHANGE UP (ref 0–0.9)
MONOCYTES NFR BLD AUTO: 7.5 % — SIGNIFICANT CHANGE UP (ref 2–14)
NEUTROPHILS # BLD AUTO: 4.56 K/UL — SIGNIFICANT CHANGE UP (ref 1.8–7.4)
NEUTROPHILS NFR BLD AUTO: 67.3 % — SIGNIFICANT CHANGE UP (ref 43–77)
NRBC # BLD: 0 /100 WBCS — SIGNIFICANT CHANGE UP (ref 0–0)
PHOSPHATE SERPL-MCNC: 3.4 MG/DL — SIGNIFICANT CHANGE UP (ref 2.5–4.5)
PLATELET # BLD AUTO: 305 K/UL — SIGNIFICANT CHANGE UP (ref 150–400)
POTASSIUM SERPL-MCNC: 3.7 MMOL/L — SIGNIFICANT CHANGE UP (ref 3.5–5.3)
POTASSIUM SERPL-SCNC: 3.7 MMOL/L — SIGNIFICANT CHANGE UP (ref 3.5–5.3)
PROT SERPL-MCNC: 6.8 G/DL — SIGNIFICANT CHANGE UP (ref 6–8.3)
PROTHROM AB SERPL-ACNC: 14.4 SEC — HIGH (ref 10.6–13.6)
RBC # BLD: 3.04 M/UL — LOW (ref 3.8–5.2)
RBC # FLD: 13.1 % — SIGNIFICANT CHANGE UP (ref 10.3–14.5)
RH IG SCN BLD-IMP: POSITIVE — SIGNIFICANT CHANGE UP
SARS-COV-2 RNA SPEC QL NAA+PROBE: SIGNIFICANT CHANGE UP
SODIUM SERPL-SCNC: 140 MMOL/L — SIGNIFICANT CHANGE UP (ref 135–145)
TROPONIN T, HIGH SENSITIVITY RESULT: 7 NG/L — SIGNIFICANT CHANGE UP (ref 0–51)
TROPONIN T, HIGH SENSITIVITY RESULT: 7 NG/L — SIGNIFICANT CHANGE UP (ref 0–51)
WBC # BLD: 6.78 K/UL — SIGNIFICANT CHANGE UP (ref 3.8–10.5)
WBC # FLD AUTO: 6.78 K/UL — SIGNIFICANT CHANGE UP (ref 3.8–10.5)

## 2020-09-10 PROCEDURE — 71045 X-RAY EXAM CHEST 1 VIEW: CPT | Mod: 26

## 2020-09-10 PROCEDURE — 93308 TTE F-UP OR LMTD: CPT | Mod: 26

## 2020-09-10 PROCEDURE — 71275 CT ANGIOGRAPHY CHEST: CPT | Mod: 26

## 2020-09-10 PROCEDURE — 99285 EMERGENCY DEPT VISIT HI MDM: CPT

## 2020-09-10 PROCEDURE — 93010 ELECTROCARDIOGRAM REPORT: CPT

## 2020-09-10 RX ORDER — LEVOTHYROXINE SODIUM 125 MCG
1 TABLET ORAL
Qty: 0 | Refills: 0 | DISCHARGE

## 2020-09-10 RX ORDER — HEPARIN SODIUM 5000 [USP'U]/ML
3000 INJECTION INTRAVENOUS; SUBCUTANEOUS EVERY 6 HOURS
Refills: 0 | Status: DISCONTINUED | OUTPATIENT
Start: 2020-09-10 | End: 2020-09-12

## 2020-09-10 RX ORDER — HEPARIN SODIUM 5000 [USP'U]/ML
6500 INJECTION INTRAVENOUS; SUBCUTANEOUS EVERY 6 HOURS
Refills: 0 | Status: DISCONTINUED | OUTPATIENT
Start: 2020-09-10 | End: 2020-09-12

## 2020-09-10 RX ORDER — FERROUS SULFATE 325(65) MG
1 TABLET ORAL
Qty: 0 | Refills: 0 | DISCHARGE

## 2020-09-10 RX ORDER — LEVOTHYROXINE SODIUM 125 MCG
150 TABLET ORAL DAILY
Refills: 0 | Status: DISCONTINUED | OUTPATIENT
Start: 2020-09-10 | End: 2020-09-13

## 2020-09-10 RX ORDER — ACETAMINOPHEN 500 MG
975 TABLET ORAL EVERY 6 HOURS
Refills: 0 | Status: DISCONTINUED | OUTPATIENT
Start: 2020-09-10 | End: 2020-09-13

## 2020-09-10 RX ORDER — HEPARIN SODIUM 5000 [USP'U]/ML
INJECTION INTRAVENOUS; SUBCUTANEOUS
Qty: 25000 | Refills: 0 | Status: DISCONTINUED | OUTPATIENT
Start: 2020-09-10 | End: 2020-09-12

## 2020-09-10 RX ORDER — HEPARIN SODIUM 5000 [USP'U]/ML
6500 INJECTION INTRAVENOUS; SUBCUTANEOUS ONCE
Refills: 0 | Status: COMPLETED | OUTPATIENT
Start: 2020-09-10 | End: 2020-09-10

## 2020-09-10 RX ADMIN — HEPARIN SODIUM 1400 UNIT(S)/HR: 5000 INJECTION INTRAVENOUS; SUBCUTANEOUS at 18:13

## 2020-09-10 RX ADMIN — Medication 300 MILLIGRAM(S): at 22:30

## 2020-09-10 RX ADMIN — HEPARIN SODIUM 6500 UNIT(S): 5000 INJECTION INTRAVENOUS; SUBCUTANEOUS at 18:12

## 2020-09-10 NOTE — ED PROVIDER NOTE - NS ED ROS FT
Constitutional: no fevers; no chills  HEENT: no visual changes, no sore throat, no rhinorrhea  CV: cp; no palpitations  Resp: sob; no cough  GI: no abd pain, nausea, no vomiting, no diarrhea, no constipation  : no dysuria, no hematuria; vaginal bleeding;  MSK: no myalgais; no arthralgias  skin: no rashes  neuro: no HA, no numbness; no weakness, no tingling  ROS statement: all other ROS negative except as per HPI

## 2020-09-10 NOTE — ED ADULT NURSE NOTE - OBJECTIVE STATEMENT
35 y/o female with PMH of H 33 y/o female with PMH of Hypothyroidism and migraines c/o "feeling short of breath and pressure in chest s/p  2 weeks ago. Sent by OBGYN for concern of a PE. Vitals stable. No fever, respiratory distress, chest pain, nausea, vomiting, dizziness, diarrhea or any diff. urinating. Pt is ambulatory to bathroom without any difficulties. EKG- NSR.

## 2020-09-10 NOTE — ED PROVIDER NOTE - PHYSICAL EXAMINATION
PHYSICAL EXAM:  GENERAL: non-toxic appearing; in no respiratory distress  HEAD Atraumatic, Normocephalic  NECK: No JVD; FROM  EYES: PERRL, EOMs intact b/l w/out deficits  CHEST/LUNG: CTAB no wheezes/rhonchi/rales  HEART: RRR no murmur/gallops/rubs  ABDOMEN: +BS, soft, NT, ND  EXTREMITIES: No LE edema, +2 radial pulses b/l, +2 DP/PT pulses b/l  MUSCULOSKELETAL: FROM of all 4 extremities;  NERVOUS SYSTEM:  A&Ox3, No motor deficits or sensory deficits; CNII-XII intact; no focal neurologic deficits  SKIN:  No new rashes; well-healed surgical scar on lower abd;

## 2020-09-10 NOTE — ED PROVIDER NOTE - OBJECTIVE STATEMENT
35 yo F pmhx hypothyroid, POD#16 s/p  2/2 placenta accreta c/b heavy vaginal bleeding and endometritis, currently on cipro and levaquin, presents to the ED for midsternal chest discomfort and SOB x3 days. Pt states she mostly notices it while at rest and comes on without exacerbating factors. It is not exertional nor pleuritic in nature. Pt having vaginal bleeding which she states has normalized. Denies cough, vomiting, dysuria, hematuria, leg swelling, blurred vision, dizziness, palpitations, diarrhea.   OB Dr. Cota

## 2020-09-10 NOTE — H&P ADULT - HISTORY OF PRESENT ILLNESS
35 yo F pmhx hypothyroid, POD#16 s/p  2/2 placenta accreta c/b heavy vaginal bleeding and endometritis, currently on cipro and levaquin, presents to the ED for midsternal chest discomfort and SOB x3 days. Pt states she mostly notices it while at rest and comes on without exacerbating factors. It is not exertional nor pleuritic in nature. Pt having vaginal bleeding which she states has normalized. Denies cough, vomiting, dysuria, hematuria, leg swelling, blurred vision, dizziness, palpitations, diarrhea

## 2020-09-10 NOTE — CHART NOTE - NSCHARTNOTEFT_GEN_A_CORE
pt seen in ED after dx with b/l PE. To be admitted to medicine service. Will round on pt ramon Bucio

## 2020-09-10 NOTE — ED PROVIDER NOTE - PROGRESS NOTE DETAILS
Pavan Hercules MD. CT findings explained to patient. started on heparin. OB/GYn made aware. will follow. pt admitted unattached

## 2020-09-10 NOTE — ED PROVIDER NOTE - ATTENDING CONTRIBUTION TO CARE
Alvarado Howard MD, FACEP: In this physician's medical judgement based on clinical history and physical exam, patient with shortness of breath in the setting of pregnancy, infection, surgery, and prolonged hospital stay. There is given concern for PE and will CTA chest, TTE for evaluation of right heart strain and pericardial effusion as well as depressed ef secondary to postpartum cardiomyopathy. Echo without depressed ef or right heart strain, trace percardial effusion noted, cta pending by evening team.  Will follow up on labs, analgesia, imaging, reassess and disposition as clinically indicated.

## 2020-09-10 NOTE — ED PROVIDER NOTE - SHIFT CHANGE DETAILS
Alvarado Howard MD FACEP note of transfer at the usual time of sign out: Receiving team will follow up on labs, analgesia, any clinical imaging, reassess and disposition as clinically indicated.  Details of patient and plan conveyed to receiving physician and conveyed back for understanding.  There were no questions at this time about the patient's status, disposition, and plan. Patient's care to be taken over by receiving physician at this time, all decisions regarding the progression of care will be made at their discretion.

## 2020-09-10 NOTE — ED ADULT NURSE REASSESSMENT NOTE - NS ED NURSE REASSESS COMMENT FT1
Report received from RN Chris, patient tearful due to diagnosis, spoke with patient and answered all questions. Rate of medication running is accurate, patient aware she is being admitted to the hospital, awaiting bed assignment at this time. Bed locked and in lowest position for safety.

## 2020-09-10 NOTE — ED PROVIDER NOTE - CLINICAL SUMMARY MEDICAL DECISION MAKING FREE TEXT BOX
Pavan Hercules MD. pt pod#16 s/p  2/2 placenta accreta, hypothyroid, presents to ED for cp and sob x3 days. exam as above. no leg swelling. concern for PE. will send for labs, reassess.

## 2020-09-11 LAB
APTT BLD: 139.3 SEC — CRITICAL HIGH (ref 27.5–35.5)
APTT BLD: 90.6 SEC — HIGH (ref 27.5–35.5)
APTT BLD: 92.8 SEC — HIGH (ref 27.5–35.5)
HCT VFR BLD CALC: 28 % — LOW (ref 34.5–45)
HCT VFR BLD CALC: 28.1 % — LOW (ref 34.5–45)
HGB BLD-MCNC: 8.9 G/DL — LOW (ref 11.5–15.5)
HGB BLD-MCNC: 8.9 G/DL — LOW (ref 11.5–15.5)
MCHC RBC-ENTMCNC: 30.4 PG — SIGNIFICANT CHANGE UP (ref 27–34)
MCHC RBC-ENTMCNC: 30.5 PG — SIGNIFICANT CHANGE UP (ref 27–34)
MCHC RBC-ENTMCNC: 31.7 GM/DL — LOW (ref 32–36)
MCHC RBC-ENTMCNC: 31.8 GM/DL — LOW (ref 32–36)
MCV RBC AUTO: 95.9 FL — SIGNIFICANT CHANGE UP (ref 80–100)
MCV RBC AUTO: 95.9 FL — SIGNIFICANT CHANGE UP (ref 80–100)
NRBC # BLD: 0 /100 WBCS — SIGNIFICANT CHANGE UP (ref 0–0)
NRBC # BLD: 0 /100 WBCS — SIGNIFICANT CHANGE UP (ref 0–0)
PLATELET # BLD AUTO: 300 K/UL — SIGNIFICANT CHANGE UP (ref 150–400)
PLATELET # BLD AUTO: 303 K/UL — SIGNIFICANT CHANGE UP (ref 150–400)
RBC # BLD: 2.92 M/UL — LOW (ref 3.8–5.2)
RBC # BLD: 2.93 M/UL — LOW (ref 3.8–5.2)
RBC # FLD: 13.1 % — SIGNIFICANT CHANGE UP (ref 10.3–14.5)
RBC # FLD: 13.2 % — SIGNIFICANT CHANGE UP (ref 10.3–14.5)
WBC # BLD: 6.27 K/UL — SIGNIFICANT CHANGE UP (ref 3.8–10.5)
WBC # BLD: 6.77 K/UL — SIGNIFICANT CHANGE UP (ref 3.8–10.5)
WBC # FLD AUTO: 6.27 K/UL — SIGNIFICANT CHANGE UP (ref 3.8–10.5)
WBC # FLD AUTO: 6.77 K/UL — SIGNIFICANT CHANGE UP (ref 3.8–10.5)

## 2020-09-11 PROCEDURE — 99223 1ST HOSP IP/OBS HIGH 75: CPT | Mod: GC

## 2020-09-11 RX ORDER — LACTOBACILLUS ACIDOPHILUS 100MM CELL
1 CAPSULE ORAL DAILY
Refills: 0 | Status: DISCONTINUED | OUTPATIENT
Start: 2020-09-11 | End: 2020-09-13

## 2020-09-11 RX ADMIN — HEPARIN SODIUM 0 UNIT(S)/HR: 5000 INJECTION INTRAVENOUS; SUBCUTANEOUS at 01:40

## 2020-09-11 RX ADMIN — Medication 300 MILLIGRAM(S): at 13:12

## 2020-09-11 RX ADMIN — Medication 300 MILLIGRAM(S): at 05:35

## 2020-09-11 RX ADMIN — Medication 300 MILLIGRAM(S): at 21:04

## 2020-09-11 RX ADMIN — HEPARIN SODIUM 1200 UNIT(S)/HR: 5000 INJECTION INTRAVENOUS; SUBCUTANEOUS at 02:42

## 2020-09-11 RX ADMIN — Medication 1 TABLET(S): at 17:58

## 2020-09-11 RX ADMIN — Medication 150 MICROGRAM(S): at 05:35

## 2020-09-11 RX ADMIN — HEPARIN SODIUM 1200 UNIT(S)/HR: 5000 INJECTION INTRAVENOUS; SUBCUTANEOUS at 19:28

## 2020-09-11 RX ADMIN — HEPARIN SODIUM 1200 UNIT(S)/HR: 5000 INJECTION INTRAVENOUS; SUBCUTANEOUS at 11:07

## 2020-09-11 NOTE — CONSULT NOTE ADULT - ASSESSMENT
33 yo F pmhx hypothyroid, POD#17 s/p  2/2 placenta accreta c/b heavy vaginal bleeding and endometritis, currently on cipro and levaquin, presents for midsternal chest discomfort and SOB x3 days. CTA shows multiple PE. Started on heparin gtt. POCUS showed no evidence of right heart strain, no pleural effusions.    Recommendations:  - c/w heparin gtt for total of 48 hrs, observe for any signs of bleeding or hemodynamic instability. If no bleeding should be transitioned and discharged on DOAC with close follow-up with a PCP.   - hypercoagulable workup given history of spontaneous abortions- APLS, anticardiolipin, anti-beta 2 glycoprotein, and lupus anticoagulant 35 yo F pmhx hypothyroid, POD#17 s/p  2/2 placenta accreta c/b heavy vaginal bleeding and endometritis, currently on cipro and levaquin, presents for midsternal chest discomfort and SOB x3 days. CTA shows multiple PE. Started on heparin gtt. POCUS showed no evidence of right heart strain, no pleural effusions.    Recommendations:  - c/w heparin gtt for total of 48 hrs, observe for any signs of bleeding or hemodynamic instability. If no bleeding should be transitioned and discharged on DOAC with close follow-up with a PCP.   - hypercoagulable workup given history of spontaneous abortions - APLS, anticardiolipin, anti-beta 2 glycoprotein, and lupus anticoagulant    Patient can follow up with us outpatient at [address] [facility], [address]. Number [phone]  Appointments can also be made by e-mailing [email or web address] and providing patient's name, , and discharge diagnosis 35 yo F pmhx hypothyroid, POD#17 s/p  2/2 placenta accreta c/b heavy vaginal bleeding and endometritis, currently on cipro and levaquin, presents for midsternal chest discomfort and SOB x3 days. CTA shows multiple PE. Started on heparin gtt. POCUS showed no evidence of right heart strain, pleural effusions, or pulmonary edema.    Recommendations:  - Continue with heparin gtt for now and observe for any signs of bleeding or hemodynamic instability, trend CBC. If no bleeding should be transitioned and discharged on DOAC with close follow-up with PCP  - Hypercoagulable workup given history of spontaneous abortions - anti-cardiolipin, anti-beta 2 glycoprotein, lupus anticoagulant, protein S, protein C, antithrombin, can also consider genetic testing either inpatient or outpatient for factor V leiden  - Patient should follow up with pulmonologist Dr. Smith as an outpatient at [address] [place] [address], [address]. Number [phone]. Please e-mailing [email or web address] to schedule an appointment and provide patient's name, , discharge diagnosis, and telephone number    Will sign off, please call with any further questions

## 2020-09-11 NOTE — PROGRESS NOTE ADULT - SUBJECTIVE AND OBJECTIVE BOX
Patient is a 34y old  Female who presents with a chief complaint of CP/ SOB (11 Sep 2020 15:08)      HPI:  33 yo F pmhx hypothyroid, POD#16 s/p  2/2 placenta accreta c/b heavy vaginal bleeding and endometritis, currently on cipro and levaquin, presents to the ED for midsternal chest discomfort and SOB x3 days. Pt states she mostly notices it while at rest and comes on without exacerbating factors. It is not exertional nor pleuritic in nature. Pt having vaginal bleeding which she states has normalized. Denies cough, vomiting, dysuria, hematuria, leg swelling, blurred vision, dizziness, palpitations, diarrhea (10 Sep 2020 19:00)    SOB same  no more vaginal bleeding reported on IV heparin    PAST MEDICAL & SURGICAL HISTORY:  Migraines  Hypothyroidism  Vaginal delivery  H/O  section: 1x LTCS, 1x CCS      Review of Systems:   CONSTITUTIONAL: No fever, weight loss, or fatigue  EYES: No eye pain, visual disturbances, or discharge  ENMT:  No difficulty hearing, tinnitus, vertigo; No sinus or throat pain  NECK: No pain or stiffness  BREASTS: No pain, masses, or nipple discharge  RESPIRATORY: No cough, wheezing, chills or hemoptysis; No shortness of breath  CARDIOVASCULAR: No chest pain, palpitations, dizziness, or leg swelling  GASTROINTESTINAL: No abdominal or epigastric pain. No nausea, vomiting, or hematemesis; No diarrhea or constipation. No melena or hematochezia.  GENITOURINARY: No dysuria, frequency, hematuria, or incontinence  NEUROLOGICAL: No headaches, memory loss, loss of strength, numbness, or tremors  SKIN: No itching, burning, rashes, or lesions   LYMPH NODES: No enlarged glands  ENDOCRINE: No heat or cold intolerance; No hair loss  MUSCULOSKELETAL: No joint pain or swelling; No muscle, back, or extremity pain  PSYCHIATRIC: No depression, anxiety, mood swings, or difficulty sleeping  HEME/LYMPH: No easy bruising, or bleeding gums  ALLERY AND IMMUNOLOGIC: No hives or eczema    Allergies    aspirin (Anaphylaxis; Hives)  Ceclor (Rash)  penicillins (Hives)    Intolerances        Social History:     FAMILY HISTORY:      MEDICATIONS  (STANDING):  clindamycin   Capsule 300 milliGRAM(s) Oral three times a day  heparin  Infusion.  Unit(s)/Hr (14 mL/Hr) IV Continuous <Continuous>  lactobacillus acidophilus 1 Tablet(s) Oral daily  levoFLOXacin  Tablet 500 milliGRAM(s) Oral every 24 hours  levothyroxine 150 MICROGram(s) Oral daily    MEDICATIONS  (PRN):  acetaminophen   Tablet .. 975 milliGRAM(s) Oral every 6 hours PRN Temp greater or equal to 38C (100.4F), Moderate Pain (4 - 6)  heparin   Injectable 6500 Unit(s) IV Push every 6 hours PRN For aPTT less than 40  heparin   Injectable 3000 Unit(s) IV Push every 6 hours PRN For aPTT between 40 - 57        CAPILLARY BLOOD GLUCOSE        I&O's Summary      PHYSICAL EXAM:  Vital Signs Last 24 Hrs  T(C): 36.9 (11 Sep 2020 08:59), Max: 36.9 (11 Sep 2020 08:59)  T(F): 98.4 (11 Sep 2020 08:59), Max: 98.4 (11 Sep 2020 08:59)  HR: 88 (11 Sep 2020 08:59) (79 - 88)  BP: 115/79 (11 Sep 2020 08:59) (115/79 - 128/84)  BP(mean): --  RR: 18 (11 Sep 2020 08:59) (17 - 18)  SpO2: 96% (11 Sep 2020 08:59) (96% - 96%)    GENERAL: NAD, well-developed  HEAD:  Atraumatic, Normocephalic  EYES: EOMI, PERRLA, conjunctiva and sclera clear  NECK: Supple, No JVD  CHEST/LUNG: Clear to auscultation bilaterally; No wheeze  HEART: Regular rate and rhythm; No murmurs, rubs, or gallops  ABDOMEN: Soft, Nontender, Nondistended; Bowel sounds present  EXTREMITIES:  2+ Peripheral Pulses, No clubbing, cyanosis, or edema  PSYCH: AAOx3  NEUROLOGY: non-focal  SKIN: No rashes or lesions    LABS:                        8.9    6.27  )-----------( 300      ( 11 Sep 2020 06:46 )             28.0     09-10    140  |  104  |  8   ----------------------------<  87  3.7   |  21<L>  |  0.77    Ca    9.2      10 Sep 2020 14:04  Phos  3.4     09-10  Mg     2.0     09-10    TPro  6.8  /  Alb  3.9  /  TBili  0.2  /  DBili  x   /  AST  12  /  ALT  6<L>  /  AlkPhos  98  09-10    PT/INR - ( 10 Sep 2020 14:04 )   PT: 14.4 sec;   INR: 1.22 ratio         PTT - ( 11 Sep 2020 17:58 )  PTT:90.6 sec          RADIOLOGY & ADDITIONAL TESTS:    Imaging Personally Reviewed:    Consultant(s) Notes Reviewed:      Care Discussed with Consultants/Other Providers:

## 2020-09-11 NOTE — PROGRESS NOTE ADULT - PROBLEM SELECTOR PLAN 1
Started on IV heparin.  Ltd Echo n ED did not show any right sided strain, will get formal Echo  Pulm nkechi noted  TTE  Eliquis on DC after 48 hurs of IV heparin  Will FU with me and Dr Smith in office.

## 2020-09-11 NOTE — CONSULT NOTE ADULT - SUBJECTIVE AND OBJECTIVE BOX
CHIEF COMPLAINT: CP/SOB dx with multiple PE    HPI: 35 yo F pmhx hypothyroid, POD#17 s/p  2/2 placenta accreta c/b heavy vaginal bleeding and endometritis, currently on cipro and levaquin, presented to the ED for midsternal chest discomfort and SOB x3 days. Pt states she mostly noticed it while at rest and comes on without exacerbating factors. It is not exertional nor pleuritic in nature. Pt previously had vaginal bleeding which she states has normalized. Denies cough, vomiting, dysuria, hematuria, leg swelling, blurred vision, dizziness, palpitations, diarrhea    PAST MEDICAL & SURGICAL HISTORY:  Migraines  Hypothyroidism  Vaginal delivery  H/O  section: 1x LTCS, 1x CCS  3 spontaneous abortions from cervical insufficiency      FAMILY HISTORY: No family history of bleeding disorders or hypercoagulability       SOCIAL HISTORY:  Smoking: [X ] Never Smoked [ ] Former Smoker (__ packs x ___ years) [ ] Current Smoker  (__ packs x ___ years)  Substance Use: [ X] Never Used [ ] Used ____  EtOH Use: denies  Marital Status: [ ] Single [X ]  [ ]  [ ]   Sexual History:   Occupation: former teacher  Recent Travel: denies    Allergies    aspirin (Anaphylaxis; Hives)  Ceclor (Rash)  penicillins (Hives)    Intolerances        HOME MEDICATIONS:  Home Medications:  acetaminophen 325 mg oral tablet: 3 tab(s) orally every 6 hours, As Needed (10 Sep 2020 20:45)  ferrous sulfate 325 mg (65 mg elemental iron) oral tablet: 1 tab(s) orally once a day (10 Sep 2020 20:45)  ibuprofen 600 mg oral tablet: 1 tab(s) orally every 6 hours, As Needed (10 Sep 2020 20:45)  Synthroid 150 mcg (0.15 mg) oral tablet: 1 tab(s) orally once a day    NOTE: BRAND only, as per pt and pharmacy. (10 Sep 2020 20:45)    OBJECTIVE:  ICU Vital Signs Last 24 Hrs  T(C): 36.9 (11 Sep 2020 08:59), Max: 37.4 (10 Sep 2020 17:29)  T(F): 98.4 (11 Sep 2020 08:59), Max: 99.4 (10 Sep 2020 17:29)  HR: 88 (11 Sep 2020 08:59) (79 - 97)  BP: 115/79 (11 Sep 2020 08:59) (115/79 - 135/90)  BP(mean): --  ABP: --  ABP(mean): --  RR: 18 (11 Sep 2020 08:59) (17 - 18)  SpO2: 96% (11 Sep 2020 08:59) (96% - 98%)        CAPILLARY BLOOD GLUCOSE          PHYSICAL EXAM:  VITAL SIGNS: I have reviewed nursing notes and confirm.  CONSTITUTIONAL: Well-developed; well-nourished; anxious, overweight female, appears stated age  SKIN: Skin exam is warm and dry, no acute rash.  HEAD: Normocephalic; atraumatic.  EYES: PERRL, EOM intact; conjunctiva and sclera clear.  ENT: No nasal discharge; airway clear. TMs clear.  NECK: Supple; non tender.  CARD: S1, S2 normal; no murmurs, gallops, or rubs. Regular rate and rhythm.  RESP: CTAB, No wheezes, rales or rhonchi.  ABD: Normal bowel sounds; soft; tender at c/s incision site, no hepatosplenomegaly.  EXT: Normal ROM. No clubbing, cyanosis or edema.  LYMPH: No acute cervical adenopathy.  NEURO: Alert, oriented. Grossly unremarkable. No focal deficits.  PSYCH: Cooperative, appropriate.     HOSPITAL MEDICATIONS:  Standing Meds:  clindamycin   Capsule 300 milliGRAM(s) Oral three times a day  heparin  Infusion.  Unit(s)/Hr IV Continuous <Continuous>  levoFLOXacin  Tablet 500 milliGRAM(s) Oral every 24 hours  levothyroxine 150 MICROGram(s) Oral daily      PRN Meds:  acetaminophen   Tablet .. 975 milliGRAM(s) Oral every 6 hours PRN  heparin   Injectable 6500 Unit(s) IV Push every 6 hours PRN  heparin   Injectable 3000 Unit(s) IV Push every 6 hours PRN      LABS:                        8.9    6.27  )-----------( 300      ( 11 Sep 2020 06:46 )             28.0     Hgb Trend: 8.9<--, 8.9<--, 9.2<--  09-10    140  |  104  |  8   ----------------------------<  87  3.7   |  21<L>  |  0.77    Ca    9.2      10 Sep 2020 14:04  Phos  3.4     09-10  Mg     2.0     09-10    TPro  6.8  /  Alb  3.9  /  TBili  0.2  /  DBili  x   /  AST  12  /  ALT  6<L>  /  AlkPhos  98  09-10    Creatinine Trend: 0.77<--, 0.76<--, 0.60<--, 0.73<--  PT/INR - ( 10 Sep 2020 14:04 )   PT: 14.4 sec;   INR: 1.22 ratio         PTT - ( 11 Sep 2020 09:29 )  PTT:92.8 sec          MICROBIOLOGY:       RADIOLOGY:  [ ] Reviewed and interpreted by me    < from: CT Angio Chest w/ IV Cont (09.10.20 @ 16:51) >    EXAM:  CT ANGIO CHEST (W)AW IC                            PROCEDURE DATE:  09/10/2020            INTERPRETATION:  CLINICAL INFORMATION: Chest pain, shortness of breath, postop    COMPARISON: Chest x-ray 9/10/2020. CT abdomen pelvis 2020.    PROCEDURE:  CT Angiography of the Chest.  90 ml of Omnipaque 350 was injected intravenously. 10 ml were discarded.  Sagittal and coronal reformats were performed as well as 3D (MIP) reconstructions.    FINDINGS:    LUNGS AND AIRWAYS: Patent central airways.  Lungs are clear.  PLEURA: No pleural effusion.  MEDIASTINUM AND RIKKI: No lymphadenopathy.  VESSELS: Multiple bilateral acute segmental and subsegmental pulmonary emboli involving the left upper lobe and left lower lobe, right lower lobe, right upper lobe  HEART: Heart size is normal. No pericardial effusion. No evidence for heart strain.  CHEST WALL AND LOWER NECK: Within normal limits.  VISUALIZED UPPER ABDOMEN: Within normal limits.  BONES: Within normal limits.    IMPRESSION:    Multiple bilateral acute pulmonary emboli as above.    < end of copied text >      PULMONARY FUNCTION TESTS:    EKG:

## 2020-09-11 NOTE — PROVIDER CONTACT NOTE (CRITICAL VALUE NOTIFICATION) - ACTION/TREATMENT ORDERED:
PA aware. Scanned pt and bag - holding for 60 minutes then restarting heparin infusion 2ml/hr less (12ml/hr)

## 2020-09-11 NOTE — CONSULT NOTE ADULT - ATTENDING COMMENTS
Pt seen and examined, agree with above. 34 F postpartum day 17 with medical hx as noted above, now with acute b/l segmental/ subsegmental PEs. Stable hemodynamics and oxygenation on RA, troponins negative, proBNP 13. Cont AC with a heparin gtt. No evidence of RV strain on POCUS, would suggest an official TTE. Check O2 sat following ambulation. PE likely triggered by postpartum state, but given h/o miscarriages would suggest hypercoagulable work up as well. Outpt pulm FUP upon discharge.

## 2020-09-12 ENCOUNTER — TRANSCRIPTION ENCOUNTER (OUTPATIENT)
Age: 35
End: 2020-09-12

## 2020-09-12 DIAGNOSIS — F43.22 ADJUSTMENT DISORDER WITH ANXIETY: ICD-10-CM

## 2020-09-12 LAB
ANION GAP SERPL CALC-SCNC: 13 MMOL/L — SIGNIFICANT CHANGE UP (ref 5–17)
APTT BLD: 101 SEC — HIGH (ref 27.5–35.5)
BUN SERPL-MCNC: 8 MG/DL — SIGNIFICANT CHANGE UP (ref 7–23)
CALCIUM SERPL-MCNC: 9.7 MG/DL — SIGNIFICANT CHANGE UP (ref 8.4–10.5)
CHLORIDE SERPL-SCNC: 104 MMOL/L — SIGNIFICANT CHANGE UP (ref 96–108)
CO2 SERPL-SCNC: 23 MMOL/L — SIGNIFICANT CHANGE UP (ref 22–31)
CREAT SERPL-MCNC: 0.78 MG/DL — SIGNIFICANT CHANGE UP (ref 0.5–1.3)
GLUCOSE SERPL-MCNC: 88 MG/DL — SIGNIFICANT CHANGE UP (ref 70–99)
HCT VFR BLD CALC: 29.4 % — LOW (ref 34.5–45)
HGB BLD-MCNC: 9.4 G/DL — LOW (ref 11.5–15.5)
MCHC RBC-ENTMCNC: 30.4 PG — SIGNIFICANT CHANGE UP (ref 27–34)
MCHC RBC-ENTMCNC: 32 GM/DL — SIGNIFICANT CHANGE UP (ref 32–36)
MCV RBC AUTO: 95.1 FL — SIGNIFICANT CHANGE UP (ref 80–100)
NRBC # BLD: 0 /100 WBCS — SIGNIFICANT CHANGE UP (ref 0–0)
PLATELET # BLD AUTO: 322 K/UL — SIGNIFICANT CHANGE UP (ref 150–400)
POTASSIUM SERPL-MCNC: 4.3 MMOL/L — SIGNIFICANT CHANGE UP (ref 3.5–5.3)
POTASSIUM SERPL-SCNC: 4.3 MMOL/L — SIGNIFICANT CHANGE UP (ref 3.5–5.3)
RBC # BLD: 3.09 M/UL — LOW (ref 3.8–5.2)
RBC # FLD: 13.2 % — SIGNIFICANT CHANGE UP (ref 10.3–14.5)
SODIUM SERPL-SCNC: 140 MMOL/L — SIGNIFICANT CHANGE UP (ref 135–145)
WBC # BLD: 5.65 K/UL — SIGNIFICANT CHANGE UP (ref 3.8–10.5)
WBC # FLD AUTO: 5.65 K/UL — SIGNIFICANT CHANGE UP (ref 3.8–10.5)

## 2020-09-12 PROCEDURE — 90792 PSYCH DIAG EVAL W/MED SRVCS: CPT

## 2020-09-12 RX ORDER — CHLORHEXIDINE GLUCONATE 213 G/1000ML
1 SOLUTION TOPICAL DAILY
Refills: 0 | Status: DISCONTINUED | OUTPATIENT
Start: 2020-09-12 | End: 2020-09-13

## 2020-09-12 RX ORDER — APIXABAN 2.5 MG/1
2 TABLET, FILM COATED ORAL
Qty: 88 | Refills: 0
Start: 2020-09-12

## 2020-09-12 RX ORDER — APIXABAN 2.5 MG/1
10 TABLET, FILM COATED ORAL EVERY 12 HOURS
Refills: 0 | Status: DISCONTINUED | OUTPATIENT
Start: 2020-09-12 | End: 2020-09-13

## 2020-09-12 RX ORDER — MIRTAZAPINE 45 MG/1
15 TABLET, ORALLY DISINTEGRATING ORAL AT BEDTIME
Refills: 0 | Status: DISCONTINUED | OUTPATIENT
Start: 2020-09-12 | End: 2020-09-13

## 2020-09-12 RX ORDER — ZOLPIDEM TARTRATE 10 MG/1
5 TABLET ORAL AT BEDTIME
Refills: 0 | Status: DISCONTINUED | OUTPATIENT
Start: 2020-09-12 | End: 2020-09-13

## 2020-09-12 RX ADMIN — Medication 1 TABLET(S): at 17:38

## 2020-09-12 RX ADMIN — HEPARIN SODIUM 1000 UNIT(S)/HR: 5000 INJECTION INTRAVENOUS; SUBCUTANEOUS at 12:28

## 2020-09-12 RX ADMIN — APIXABAN 10 MILLIGRAM(S): 2.5 TABLET, FILM COATED ORAL at 21:50

## 2020-09-12 RX ADMIN — Medication 150 MICROGRAM(S): at 05:30

## 2020-09-12 RX ADMIN — Medication 300 MILLIGRAM(S): at 14:35

## 2020-09-12 NOTE — BEHAVIORAL HEALTH ASSESSMENT NOTE - NSBHCONSULTFOLLOWAFTERCARE_PSY_A_CORE FT
recommend follow up with [facility]  clinic or [facility] depending on availability recommend follow up with [facility]  clinic [phone] or [facility] depending on availability

## 2020-09-12 NOTE — BEHAVIORAL HEALTH ASSESSMENT NOTE - COMMENTS ON VIOLENCE RISK/PROTECTIVE FACTORS:
Patient has no history of violence, no current HI, is bonding well with her children, is seeking help and support, denies depressed mood and anger. At low risk of harm to others.

## 2020-09-12 NOTE — BEHAVIORAL HEALTH ASSESSMENT NOTE - CASE SUMMARY
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 Psychiatry consulted for persistent crying, anxiety. Patient presentation significant for anxiety, insomnia, fatigue since recent delivery, multiple complications, and caring for new infant and 2 daughters. No reported psychotic symptoms. Patient would likely benefit from supportive psychotherapy and medication to help with sleep, no acute safety concerns.  PT may benefit from Remeron 15mg po qhs  Ambien 5mg po qhs prn  F/u with the  psychiatry program at [facility].

## 2020-09-12 NOTE — DISCHARGE NOTE PROVIDER - NSDCFUSCHEDAPPT_GEN_ALL_CORE_FT
JYOTSNA ACKERMAN ; 09/14/2020 ; NPP OB/GYN  [address] [facility]  JYOTSNA ACKERMAN ; 10/22/2020 ; NPP Neuro Pain [address] [facility]  JYOTSNA ACKERMAN ; 10/26/2020 ; NPP Med GenInt [address]

## 2020-09-12 NOTE — BEHAVIORAL HEALTH ASSESSMENT NOTE - SUMMARY
33yo F, , domiciled w/ and daughters, currently not working, no formal psychiatric history, no substance use, no hx of SI/SA/HI, now 17 days post repeat  for placenta accreta w/significant blood loss, complicated by readmission for endometritis, now admitted again for b/l acute PEs. Psychiatry consulted for persistent crying, anxiety. Patient presentation significant for anxiety, insomnia, fatigue iso recent delivery, multiple complications, and caring for new infant and 2 daughters. No reported mood or psychotic symptoms. Patient would likely benefit from supportive psychotherapy and antidepressant at this time, no acute safety concerns. 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 Psychiatry consulted for persistent crying, anxiety. Patient presentation significant for anxiety, insomnia, fatigue since recent delivery, multiple complications, and caring for new infant and 2 daughters. No reported psychotic symptoms. Patient would likely benefit from supportive psychotherapy and medication to help with sleep, no acute safety concerns.

## 2020-09-12 NOTE — DISCHARGE NOTE PROVIDER - CARE PROVIDER_API CALL
Argenis Smith  CRITICAL CARE MEDICINE  [address], [address]  [place] [address]  Phone: [phone]  Fax: [phone]  Follow Up Time:

## 2020-09-12 NOTE — BEHAVIORAL HEALTH ASSESSMENT NOTE - NSBHCHARTREVIEWINVESTIGATE_PSY_A_CORE FT
Ventricular Rate 99 BPM  Atrial Rate 99 BPM  P-R Interval 140 ms  QRS Duration 92 ms  Q-T Interval 338 ms  QTC Calculation(Bezet) 433 ms  P Axis 77 degrees  R Axis 63 degrees  T Axis 24 degrees  Diagnosis Line NORMAL SINUS RHYTHM  POSSIBLE LEFT ATRIAL ENLARGEMENT  NONSPECIFIC ST ABNORMALITY  ABNORMAL ECG  NO PREVIOUS ECGS AVAILABLE

## 2020-09-12 NOTE — BEHAVIORAL HEALTH ASSESSMENT NOTE - SUICIDE PROTECTIVE FACTORS
Has future plans/Supportive social network of family or friends/Fear of death or the actual act of killing self/Positive therapeutic relationships/Responsibility to family and others/Identifies reasons for living

## 2020-09-12 NOTE — BEHAVIORAL HEALTH ASSESSMENT NOTE - HPI (INCLUDE ILLNESS QUALITY, SEVERITY, DURATION, TIMING, CONTEXT, MODIFYING FACTORS, ASSOCIATED SIGNS AND SYMPTOMS)
35yo F, , domiciled w/ and daughters, currently not working, no formal psychiatric history, no substance use, no hx of SI/SA/HI, now 17 days post repeat  for placenta accreta w/significant blood loss, complicated by readmission for endometritis, now admitted again for b/l acute PEs. Psychiatry consulted for persistent crying, anxiety.    On interview, patient is tearful and anxious. She speaks mostly about her anxiety surrounding her medical status. She is looking up recommended medication for PEs (likely apixaban) and is very anxious about possible side effects. She reports feeling overwhelmed by all of her medical issues and distressed by being in the hospital again and being away from her family. She reports feeling "out of it" when she is home; she is able to shower, eat, sleep (though fragmented due to ), but feels overwhelmed by taking care of her family as well. Patient is insistent she does not want more children, but is currently not on any birth control. Previously on the pill but now with provoked PEs. Patient is not breastfeeding.    Patient reports no complications with first delivery. She had some depressed mood after second delivery but was able to recover more quickly, exercise, take care of herself. With this delivery and its complications, patient feels out of control of her body and anxious about the future. She denies feeling depressed, has no suicidal thoughts, has no violent or homicidal thoughts towards anyone including her children, and denies psychotic symptoms. She requests to speak with someone for support during this time. Pt is a 34yr old  woman, , domiciled w/ and daughters 6,3 and two week old baby girl, currently not working, no formal psychiatric history, no substance use, no hx of SI/SA/HI, now 17 days post repeat  for placenta accreta w/significant blood loss, complicated by readmission for endometritis, now admitted again for b/l acute PEs. Psychiatry consulted for persistent crying, anxiety.    On interview, patient is tearful and anxious. She speaks mostly about her anxiety surrounding her medical status. She is looking up recommended medication for PEs (likely Apixaban) and is very anxious about possible side effects. She reports feeling overwhelmed by all of her medical issues and distressed by being in the hospital again and being away from her family. She reports feeling "out of it" when she is home; she is able to shower, eat, sleep (though fragmented due to ), but feels overwhelmed by taking care of her family as well. Patient is insistent she does not want more children, but is currently not on any birth control. Previously on the pill but now with provoked PEs. Patient is not breastfeeding.    Patient reports no complications with first delivery. She had some depressed mood after second delivery but was able to recover more quickly, exercise, take care of herself. With this delivery and its complications, patient feels out of control of her body and anxious about the future. She denies feeling depressed, has no suicidal thoughts, has no violent or homicidal thoughts towards anyone including her children, and denies psychotic symptoms. She requests to speak with someone for support during this time.

## 2020-09-12 NOTE — DISCHARGE NOTE PROVIDER - NSDCCPCAREPLAN_GEN_ALL_CORE_FT
PRINCIPAL DISCHARGE DIAGNOSIS  Diagnosis: Bilateral pulmonary embolism  Assessment and Plan of Treatment: Take your anticoagulation (lovenox, coumadin) as directed.  Follow up with your health care provider within one week. Call for appointment.  If you develop shortness of breath or if your shortness of breath worsens call your Health Care Provider or go to the Emergency Department.        SECONDARY DISCHARGE DIAGNOSES  Diagnosis: Adjustment disorder with anxiety  Assessment and Plan of Treatment: May follow up at [facility]    Diagnosis: Endometritis  Assessment and Plan of Treatment: Completed a course of Abx. Follow up with GYN

## 2020-09-12 NOTE — BEHAVIORAL HEALTH ASSESSMENT NOTE - DESCRIPTION
hypothyroidism, NSVDx1, uncomplicated Csection x1, now with repeat Csection c/b placenta accreta, heavy blood loss, endometritis, and acute b/l PEs.

## 2020-09-12 NOTE — PROGRESS NOTE ADULT - SUBJECTIVE AND OBJECTIVE BOX
Patient is a 34y old  Female who presents with a chief complaint of CP/ SOB (11 Sep 2020 15:08)      HPI:  no more vaginal bleeding reported on IV heparin    PAST MEDICAL & SURGICAL HISTORY:  Migraines  Hypothyroidism  Vaginal delivery  H/O  section: 1x LTCS, 1x CCS      Review of Systems:   CONSTITUTIONAL: No fever, weight loss, or fatigue  EYES: No eye pain, visual disturbances, or discharge  ENMT:  No difficulty hearing, tinnitus, vertigo; No sinus or throat pain  NECK: No pain or stiffness  BREASTS: No pain, masses, or nipple discharge  RESPIRATORY: No cough, wheezing, chills or hemoptysis; No shortness of breath  CARDIOVASCULAR: No chest pain, palpitations, dizziness, or leg swelling  GASTROINTESTINAL: No abdominal or epigastric pain. No nausea, vomiting, or hematemesis; No diarrhea or constipation. No melena or hematochezia.  GENITOURINARY: No dysuria, frequency, hematuria, or incontinence  NEUROLOGICAL: No headaches, memory loss, loss of strength, numbness, or tremors  SKIN: No itching, burning, rashes, or lesions   LYMPH NODES: No enlarged glands  ENDOCRINE: No heat or cold intolerance; No hair loss  MUSCULOSKELETAL: No joint pain or swelling; No muscle, back, or extremity pain  PSYCHIATRIC: No depression, anxiety, mood swings, or difficulty sleeping  HEME/LYMPH: No easy bruising, or bleeding gums  ALLERY AND IMMUNOLOGIC: No hives or eczema    Allergies    aspirin (Anaphylaxis; Hives)  Ceclor (Rash)  penicillins (Hives)    Intolerances        Social History:     FAMILY HISTORY:      MEDICATIONS  (STANDING):  clindamycin   Capsule 300 milliGRAM(s) Oral three times a day  heparin  Infusion.  Unit(s)/Hr (14 mL/Hr) IV Continuous <Continuous>  lactobacillus acidophilus 1 Tablet(s) Oral daily  levoFLOXacin  Tablet 500 milliGRAM(s) Oral every 24 hours  levothyroxine 150 MICROGram(s) Oral daily    MEDICATIONS  (PRN):  acetaminophen   Tablet .. 975 milliGRAM(s) Oral every 6 hours PRN Temp greater or equal to 38C (100.4F), Moderate Pain (4 - 6)  heparin   Injectable 6500 Unit(s) IV Push every 6 hours PRN For aPTT less than 40  heparin   Injectable 3000 Unit(s) IV Push every 6 hours PRN For aPTT between 40 - 57        CAPILLARY BLOOD GLUCOSE        I&O's Summary      PHYSICAL EXAM:  Vital Signs Last 24 Hrs  T(C): 36.9 (11 Sep 2020 08:59), Max: 36.9 (11 Sep 2020 08:59)  T(F): 98.4 (11 Sep 2020 08:59), Max: 98.4 (11 Sep 2020 08:59)  HR: 88 (11 Sep 2020 08:59) (79 - 88)  BP: 115/79 (11 Sep 2020 08:59) (115/79 - 128/84)  BP(mean): --  RR: 18 (11 Sep 2020 08:59) (17 - 18)  SpO2: 96% (11 Sep 2020 08:59) (96% - 96%)    GENERAL: NAD, well-developed  HEAD:  Atraumatic, Normocephalic  EYES: EOMI, PERRLA, conjunctiva and sclera clear  NECK: Supple, No JVD  CHEST/LUNG: Clear to auscultation bilaterally; No wheeze  HEART: Regular rate and rhythm; No murmurs, rubs, or gallops  ABDOMEN: Soft, Nontender, Nondistended; Bowel sounds present  EXTREMITIES:  2+ Peripheral Pulses, No clubbing, cyanosis, or edema  PSYCH: AAOx3  NEUROLOGY: non-focal  SKIN: No rashes or lesions    LABS:                        8.9    6.27  )-----------( 300      ( 11 Sep 2020 06:46 )             28.0     09-10    140  |  104  |  8   ----------------------------<  87  3.7   |  21<L>  |  0.77    Ca    9.2      10 Sep 2020 14:04  Phos  3.4     09-10  Mg     2.0     09-10    TPro  6.8  /  Alb  3.9  /  TBili  0.2  /  DBili  x   /  AST  12  /  ALT  6<L>  /  AlkPhos  98  09-10    PT/INR - ( 10 Sep 2020 14:04 )   PT: 14.4 sec;   INR: 1.22 ratio         PTT - ( 11 Sep 2020 17:58 )  PTT:90.6 sec          RADIOLOGY & ADDITIONAL TESTS:    Imaging Personally Reviewed:    Consultant(s) Notes Reviewed:      Care Discussed with Consultants/Other Providers:

## 2020-09-12 NOTE — BEHAVIORAL HEALTH ASSESSMENT NOTE - DETAILS
chest pain w/deep breaths, exertion recovering from C section, endometritis denies hx of SI/SA fatigue

## 2020-09-12 NOTE — DISCHARGE NOTE PROVIDER - NSDCMRMEDTOKEN_GEN_ALL_CORE_FT
acetaminophen 325 mg oral tablet: 3 tab(s) orally every 6 hours, As Needed  apixaban 5 mg oral tablet: 2 tab(s) orally 2 times a day x 7 days  then 5 mg twice daily x 30 days     clindamycin 300 mg oral capsule: 1 cap(s) orally every 8 hours   ferrous sulfate 325 mg (65 mg elemental iron) oral tablet: 1 tab(s) orally once a day  ibuprofen 600 mg oral tablet: 1 tab(s) orally every 6 hours, As Needed  Synthroid 150 mcg (0.15 mg) oral tablet: 1 tab(s) orally once a day    NOTE: BRAND only, as per pt and pharmacy.   acetaminophen 325 mg oral tablet: 3 tab(s) orally every 6 hours, As Needed  apixaban 5 mg oral tablet: 2 tab(s) orally 2 times a day x 7 days  then 5 mg twice daily x 30 days     ferrous sulfate 325 mg (65 mg elemental iron) oral tablet: 1 tab(s) orally once a day  lactobacillus acidophilus oral capsule: 1  orally once a day   mirtazapine 15 mg oral tablet: 1 tab(s) orally once a day (at bedtime)  Synthroid 150 mcg (0.15 mg) oral tablet: 1 tab(s) orally once a day    NOTE: BRAND only, as per pt and pharmacy.  zolpidem 5 mg oral tablet: 1 tab(s) orally once a day (at bedtime), As needed, Insomnia MDD:1

## 2020-09-12 NOTE — BEHAVIORAL HEALTH ASSESSMENT NOTE - RISK ASSESSMENT
Low Acute Suicide Risk Patient has acute risk factors for risk of harm including postpartum period, acute medical complications, and social stress of caring for new baby and other daughters during COVID. Protective factors include denial of current depressed mood and suicidality, no history of SI/HI/psychosis, no psychiatric history or hospitalizations, patient's help seeking behavior, future orientation, engagement with therapy, strong support from family, and participation in planning for follow up. Patient is at low risk of harm to self or others.

## 2020-09-12 NOTE — BEHAVIORAL HEALTH ASSESSMENT NOTE - NSBHCONSULTRECOMMENDOTHER_PSY_A_CORE FT
-strongly recommend discussion of birth control with patient as patient currently not on birth control, not breastfeeding, is confident she does not want more children  -patient was given information for  clinic, encourage follow up  -in-depth patient education, patient has many questions about potential medication side effects, course of medical illness, activity restrictions upon d/c, medical follow up -recommend discussion of birth control with patient as patient currently not on birth control, not breastfeeding, is confident she does not want more children  -patient was given information for  clinic, encourage follow up  -in-depth patient education, patient has many questions about potential medication side effects, course of medical illness, activity restrictions upon d/c, medical follow up

## 2020-09-12 NOTE — DISCHARGE NOTE PROVIDER - HOSPITAL COURSE
33 yo F pmhx hypothyroid, POD#16 s/p  2/2 placenta accreta c/b heavy vaginal bleeding and endometritis, currently on cipro and levaquin, presents to the ED for   midsternal chest discomfort and SOB x3 days. Pt states she mostly notices it while at rest and comes on without exacerbating factors. It is not exertional nor pleuritic in nature.   Pt having vaginal bleeding which she states has normalized. Denies cough, vomiting, dysuria, hematuria, leg swelling, blurred vision, dizziness, palpitations, diarrhea  Found to have PE. Started heparin drip and switched to eliquis now. C/O crying episodes, seen by psych, recommended remeron and ambien     35 yo F pmhx hypothyroid, POD#16 s/p  2/2 placenta accreta c/b heavy vaginal bleeding and endometritis, currently on cipro and levaquin, presents to the ED for   midsternal chest discomfort and SOB x3 days. Pt states she mostly notices it while at rest and comes on without exacerbating factors. It is not exertional nor pleuritic in nature.   Pt having vaginal bleeding which she states has normalized. Denies cough, vomiting, dysuria, hematuria, leg swelling, blurred vision, dizziness, palpitations, diarrhea  Found to have PE. Started heparin drip and switched to eliquis now. C/O crying episodes, seen by psych, recommended remeron and ambien    DCP with med rec discussed with Dr Castillo PT is cleared for DC home no needs   Follow up with PCP 33 yo F pmhx hypothyroid, POD#16 s/p  2/2 placenta accreta c/b heavy vaginal bleeding and endometritis, currently on cipro and levaquin, presents to the ED for   midsternal chest discomfort and SOB x3 days. Pt states she mostly notices it while at rest and comes on without exacerbating factors. It is not exertional nor pleuritic in nature.   Pt having vaginal bleeding which she states has normalized. Denies cough, vomiting, dysuria, hematuria, leg swelling, blurred vision, dizziness, palpitations, diarrhea  Found to have PE. Started heparin drip and switched to eliquis now. C/O crying episodes, seen by psych, recommended remeron and ambien    DCP with med rec discussed with Dr Castillo PT is cleared for DC home no needs   Follow up with PCP Dr Smith

## 2020-09-12 NOTE — PROGRESS NOTE ADULT - PROBLEM SELECTOR PLAN 1
Tolerating IV heparin.  Ltd Echo n ED did not show any right sided strain,   Pulm nkechi noted  TTE  Eliquis to be started tonight, stop IV heparin  Will FU with me and Dr Smith in office.

## 2020-09-12 NOTE — BEHAVIORAL HEALTH ASSESSMENT NOTE - NSBHCHARTREVIEWLAB_PSY_A_CORE FT
9.4    5.65  )-----------( 322      ( 12 Sep 2020 09:34 )             29.4   09-12    140  |  104  |  8   ----------------------------<  88  4.3   |  23  |  0.78    Ca    9.7      12 Sep 2020 09:34

## 2020-09-12 NOTE — BEHAVIORAL HEALTH ASSESSMENT NOTE - COMMENTS ON SUICIDE RISK/PROTECTIVE FACTORS:
Patient denies depressed mood and suicidality, has no history of SI/SA, is future oriented, is bonding well with her children including , has strong support from family.

## 2020-09-13 ENCOUNTER — TRANSCRIPTION ENCOUNTER (OUTPATIENT)
Age: 35
End: 2020-09-13

## 2020-09-13 VITALS
RESPIRATION RATE: 18 BRPM | HEART RATE: 69 BPM | OXYGEN SATURATION: 96 % | TEMPERATURE: 97 F | SYSTOLIC BLOOD PRESSURE: 120 MMHG | DIASTOLIC BLOOD PRESSURE: 80 MMHG

## 2020-09-13 PROCEDURE — 85610 PROTHROMBIN TIME: CPT

## 2020-09-13 PROCEDURE — 93005 ELECTROCARDIOGRAM TRACING: CPT

## 2020-09-13 PROCEDURE — 84100 ASSAY OF PHOSPHORUS: CPT

## 2020-09-13 PROCEDURE — 99284 EMERGENCY DEPT VISIT MOD MDM: CPT | Mod: 25

## 2020-09-13 PROCEDURE — 80053 COMPREHEN METABOLIC PANEL: CPT

## 2020-09-13 PROCEDURE — 85379 FIBRIN DEGRADATION QUANT: CPT

## 2020-09-13 PROCEDURE — 93308 TTE F-UP OR LMTD: CPT

## 2020-09-13 PROCEDURE — 96374 THER/PROPH/DIAG INJ IV PUSH: CPT | Mod: XU

## 2020-09-13 PROCEDURE — 86900 BLOOD TYPING SEROLOGIC ABO: CPT

## 2020-09-13 PROCEDURE — 83735 ASSAY OF MAGNESIUM: CPT

## 2020-09-13 PROCEDURE — 86901 BLOOD TYPING SEROLOGIC RH(D): CPT

## 2020-09-13 PROCEDURE — 85025 COMPLETE CBC W/AUTO DIFF WBC: CPT

## 2020-09-13 PROCEDURE — 84484 ASSAY OF TROPONIN QUANT: CPT

## 2020-09-13 PROCEDURE — 86147 CARDIOLIPIN ANTIBODY EA IG: CPT

## 2020-09-13 PROCEDURE — 85303 CLOT INHIBIT PROT C ACTIVITY: CPT

## 2020-09-13 PROCEDURE — 86146 BETA-2 GLYCOPROTEIN ANTIBODY: CPT

## 2020-09-13 PROCEDURE — 85730 THROMBOPLASTIN TIME PARTIAL: CPT

## 2020-09-13 PROCEDURE — 71275 CT ANGIOGRAPHY CHEST: CPT

## 2020-09-13 PROCEDURE — 85306 CLOT INHIBIT PROT S FREE: CPT

## 2020-09-13 PROCEDURE — U0003: CPT

## 2020-09-13 PROCEDURE — 85027 COMPLETE CBC AUTOMATED: CPT

## 2020-09-13 PROCEDURE — 83880 ASSAY OF NATRIURETIC PEPTIDE: CPT

## 2020-09-13 PROCEDURE — 80048 BASIC METABOLIC PNL TOTAL CA: CPT

## 2020-09-13 PROCEDURE — 71045 X-RAY EXAM CHEST 1 VIEW: CPT

## 2020-09-13 PROCEDURE — 86850 RBC ANTIBODY SCREEN: CPT

## 2020-09-13 RX ORDER — ZOLPIDEM TARTRATE 10 MG/1
1 TABLET ORAL
Qty: 30 | Refills: 0
Start: 2020-09-13 | End: 2020-10-12

## 2020-09-13 RX ORDER — MIRTAZAPINE 45 MG/1
1 TABLET, ORALLY DISINTEGRATING ORAL
Qty: 30 | Refills: 0
Start: 2020-09-13 | End: 2020-10-12

## 2020-09-13 RX ORDER — LACTOBACILLUS ACIDOPHILUS 100MM CELL
1 CAPSULE ORAL
Qty: 30 | Refills: 0
Start: 2020-09-13 | End: 2020-10-12

## 2020-09-13 RX ADMIN — Medication 150 MICROGRAM(S): at 05:30

## 2020-09-13 RX ADMIN — CHLORHEXIDINE GLUCONATE 1 APPLICATION(S): 213 SOLUTION TOPICAL at 10:27

## 2020-09-13 RX ADMIN — APIXABAN 10 MILLIGRAM(S): 2.5 TABLET, FILM COATED ORAL at 10:33

## 2020-09-13 NOTE — PHARMACOTHERAPY INTERVENTION NOTE - COMMENTS
Counseled patient about the indications, directions, and side effect profiles of her discharge medications, with emphasis on her new prescription for Eliquis. Patient was concerned about potential drug interactions with her Eliquis, and she was educated in detail about OTC medications to avoid while taking (e.g., NSAIDs/ASA). Patient is understanding on how to safely take her discharge medications and will be following up with her doctor on Monday.     Riya Ellison, PharmD  PGY-1 Pharmacy Resident  [id number]

## 2020-09-13 NOTE — DISCHARGE NOTE NURSING/CASE MANAGEMENT/SOCIAL WORK - PATIENT PORTAL LINK FT
You can access the FollowMyHealth Patient Portal offered by [facility] by registering at the following website: [email or web address]. By joining [facility]’s FollowMyHealth portal, you will also be able to view your health information using other applications (apps) compatible with our system.

## 2020-09-14 ENCOUNTER — APPOINTMENT (OUTPATIENT)
Dept: OBGYN | Facility: CLINIC | Age: 35
End: 2020-09-14
Payer: COMMERCIAL

## 2020-09-14 LAB
B2 GLYCOPROT1 AB SER QL: NEGATIVE — SIGNIFICANT CHANGE UP
DRVVT SCREEN TO CONFIRM RATIO: ABNORMAL
LA NT DPL PPP QL: 57.6 SEC — SIGNIFICANT CHANGE UP
NORMALIZED SCT PPP-RTO: 0.93 RATIO — SIGNIFICANT CHANGE UP (ref 0–1.16)
NORMALIZED SCT PPP-RTO: SIGNIFICANT CHANGE UP
PROT C ACT/NOR PPP: 148 % — SIGNIFICANT CHANGE UP (ref 74–150)
PROT S FREE AG PPP IA-ACNC: 35 % — LOW (ref 61–131)

## 2020-09-14 PROCEDURE — 0503F POSTPARTUM CARE VISIT: CPT

## 2020-09-15 LAB — CARDIOLIPIN AB SER-ACNC: NEGATIVE — SIGNIFICANT CHANGE UP

## 2020-09-18 ENCOUNTER — TRANSCRIPTION ENCOUNTER (OUTPATIENT)
Age: 35
End: 2020-09-18

## 2020-09-18 ENCOUNTER — APPOINTMENT (OUTPATIENT)
Dept: OBGYN | Facility: CLINIC | Age: 35
End: 2020-09-18

## 2020-09-18 ENCOUNTER — APPOINTMENT (OUTPATIENT)
Dept: PULMONOLOGY | Facility: CLINIC | Age: 35
End: 2020-09-18
Payer: COMMERCIAL

## 2020-09-18 PROCEDURE — 99215 OFFICE O/P EST HI 40 MIN: CPT | Mod: 95

## 2020-09-18 NOTE — HISTORY OF PRESENT ILLNESS
[Home] : at home, [unfilled] , at the time of the visit. [Medical Office: ([facility])___] : at the medical office located in  [TextBox_4] : This letter  is regarding your patient  who  attended pulmonary out patient office today.  I have reviewed  patient's  past history, social history, family history and medication list. I also  reviewed nurse practitioners/ and fellows  notes and assessment and agree with it.  \par The patient was referred by \par \par ------No history of , fever, chills , rigors, chest pain, or hemoptysis. Questionable history of Raynaud's phenomenon. No h/o significant weight loss in last few months. No history of liver dysfunction , collagen vascular disorder or chronic thromboembolic disease. I would classify the patient's dyspnea as WHO  FUNCTIONAL CLASS II--------\par \par \par History of pulmonary embolism on Eliquis, PE developed after ----no past history of DVT no family history of thromboembolic phenomena\par ----Echo  date------\par ----Pft date---------\par ----Ct scan date-------2020--bilateral PE\par \par

## 2020-09-18 NOTE — DISCUSSION/SUMMARY
[FreeTextEntry1] : ---Assessment plan----------The patient has been referred here for further opinion regarding pulmonary problem,\par \par 34-year-old female  in  embolism on Eliquis\par \par 1 continue anti-coagulation\par 2 PFT\par 3 physical visit in the office\par 4 echocardiogram\par \par Thanks for allowing  me to participate  in the care of this patient.  Patient at this time  will follow  the above mentioned recommendations and return back for follow up visit. If you have any questions  I can be reached  at #[phone] (beeper)  or  [phone] (office).\par \par Argenis Smith MD, FCCP \par Director, Pulmonary Hypertension Program \par [facility] \par Division of Pulmonary, Critical Care and Sleep Medicine \par  Professor of Medicine \par [employer]\par

## 2020-09-18 NOTE — REVIEW OF SYSTEMS
[Dyspnea] : dyspnea [Chest Discomfort] : chest discomfort [Fever] : no fever [Dry Eyes] : no dry eyes [Hay Fever] : no hay fever [GERD] : no gerd [Nocturia] : no nocturia [Arthralgias] : no arthralgias [Raynaud] : no raynaud [Anemia] : no anemia [Headache] : no headache [Depression] : no depression [Diabetes] : no diabetes

## 2020-09-22 ENCOUNTER — APPOINTMENT (OUTPATIENT)
Dept: PAIN MANAGEMENT | Facility: CLINIC | Age: 35
End: 2020-09-22
Payer: COMMERCIAL

## 2020-09-22 VITALS
HEIGHT: 60 IN | DIASTOLIC BLOOD PRESSURE: 84 MMHG | BODY MASS INDEX: 32 KG/M2 | HEART RATE: 97 BPM | WEIGHT: 163 LBS | SYSTOLIC BLOOD PRESSURE: 126 MMHG

## 2020-09-22 VITALS — TEMPERATURE: 97.3 F

## 2020-09-22 PROCEDURE — 99213 OFFICE O/P EST LOW 20 MIN: CPT

## 2020-09-22 NOTE — PHYSICAL EXAM
[General Appearance - Alert] : alert [General Appearance - In No Acute Distress] : in no acute distress [General Appearance - Well-Appearing] : healthy appearing [Oriented To Time, Place, And Person] : oriented to person, place, and time [Affect] : the affect was normal [Mood] : the mood was normal [Cranial Nerves Facial (VII)] : face symmetrical [Cranial Nerves Accessory (XI - Cranial And Spinal)] : head turning and shoulder shrug symmetric [Cranial Nerves Hypoglossal (XII)] : there was no tongue deviation with protrusion [Motor Strength] : muscle strength was normal in all four extremities [Paresis Pronator Drift Right-Sided] : no pronator drift on the right [Paresis Pronator Drift Left-Sided] : no pronator drift on the left [Motor Strength Upper Extremities Bilaterally] : strength was normal in both upper extremities [Motor Strength Lower Extremities Bilaterally] : strength was normal in both lower extremities [Abnormal Walk] : normal gait [Sclera] : the sclera and conjunctiva were normal [PERRL With Normal Accommodation] : pupils were equal in size, round, reactive to light, with normal accommodation [Extraocular Movements] : extraocular movements were intact [] : no respiratory distress [Edema] : there was no peripheral edema

## 2020-09-22 NOTE — HISTORY OF PRESENT ILLNESS
[FreeTextEntry1] : pt returns today for a followup visit .She is s/p  4 weeks ago - delivered a healthy baby girl .\par Three days after she delivered she began to have chest pain and pressure - went back to hospital - was admitted . Work up revealed a PE . She is now on Eliquis 5mg BID . Pt has a follow up appt with Pulmonary - she will have further testing. \par Has been tested for COVID -19 3x - all have been negative. \par Since having the baby has taken Naratriptan 1x .\par  Has had headaches( no aura) more frequently - treating with extra strength Tylenol. \par Discussed with pt avoiding NSAIDS while on Eliquis .\par Pt is NOT breast feeding baby.

## 2020-09-22 NOTE — ASSESSMENT
[FreeTextEntry1] : Nerivio device \par Pt has follow up with Dr Cole scheduled for 10/22/2020.  \par \par Avoid NSAIDS while on eliquis \par \par Dr Cole on site , billed incident to service.

## 2020-09-23 ENCOUNTER — FORM ENCOUNTER (OUTPATIENT)
Age: 35
End: 2020-09-23

## 2020-09-24 ENCOUNTER — RX RENEWAL (OUTPATIENT)
Age: 35
End: 2020-09-24

## 2020-09-24 DIAGNOSIS — Z01.818 ENCOUNTER FOR OTHER PREPROCEDURAL EXAMINATION: ICD-10-CM

## 2020-09-25 ENCOUNTER — APPOINTMENT (OUTPATIENT)
Dept: DISASTER EMERGENCY | Facility: CLINIC | Age: 35
End: 2020-09-25

## 2020-09-26 LAB — SARS-COV-2 N GENE NPH QL NAA+PROBE: NOT DETECTED

## 2020-09-29 ENCOUNTER — APPOINTMENT (OUTPATIENT)
Dept: PULMONOLOGY | Facility: CLINIC | Age: 35
End: 2020-09-29
Payer: COMMERCIAL

## 2020-09-29 VITALS
HEIGHT: 58.66 IN | WEIGHT: 161 LBS | HEART RATE: 97 BPM | OXYGEN SATURATION: 97 % | TEMPERATURE: 97.6 F | BODY MASS INDEX: 32.89 KG/M2

## 2020-09-29 VITALS
BODY MASS INDEX: 32.89 KG/M2 | WEIGHT: 161 LBS | HEART RATE: 98 BPM | DIASTOLIC BLOOD PRESSURE: 87 MMHG | OXYGEN SATURATION: 96 % | RESPIRATION RATE: 16 BRPM | SYSTOLIC BLOOD PRESSURE: 127 MMHG | TEMPERATURE: 98.8 F | HEIGHT: 58.66 IN

## 2020-09-29 LAB
25(OH)D3 SERPL-MCNC: 41 NG/ML
ALBUMIN SERPL ELPH-MCNC: 4.4 G/DL
ALP BLD-CCNC: 150 U/L
ALT SERPL-CCNC: 11 U/L
ANION GAP SERPL CALC-SCNC: 14 MMOL/L
AST SERPL-CCNC: 17 U/L
BASOPHILS # BLD AUTO: 0.02 K/UL
BASOPHILS NFR BLD AUTO: 0.4 %
BILIRUB SERPL-MCNC: 0.2 MG/DL
BUN SERPL-MCNC: 11 MG/DL
CALCIUM SERPL-MCNC: 10 MG/DL
CHLORIDE SERPL-SCNC: 105 MMOL/L
CO2 SERPL-SCNC: 24 MMOL/L
CREAT SERPL-MCNC: 0.84 MG/DL
EOSINOPHIL # BLD AUTO: 0.36 K/UL
EOSINOPHIL NFR BLD AUTO: 7.2 %
GLUCOSE SERPL-MCNC: 87 MG/DL
HCT VFR BLD CALC: 35.5 %
HGB BLD-MCNC: 10.9 G/DL
IMM GRANULOCYTES NFR BLD AUTO: 0.2 %
LYMPHOCYTES # BLD AUTO: 1.41 K/UL
LYMPHOCYTES NFR BLD AUTO: 28.1 %
MAN DIFF?: NORMAL
MCHC RBC-ENTMCNC: 29.2 PG
MCHC RBC-ENTMCNC: 30.7 GM/DL
MCV RBC AUTO: 95.2 FL
MONOCYTES # BLD AUTO: 0.32 K/UL
MONOCYTES NFR BLD AUTO: 6.4 %
NEUTROPHILS # BLD AUTO: 2.89 K/UL
NEUTROPHILS NFR BLD AUTO: 57.7 %
NT-PROBNP SERPL-MCNC: 52 PG/ML
PLATELET # BLD AUTO: 395 K/UL
POTASSIUM SERPL-SCNC: 4.8 MMOL/L
PROT SERPL-MCNC: 7 G/DL
RBC # BLD: 3.73 M/UL
RBC # FLD: 13 %
SARS-COV-2 IGG SERPL IA-ACNC: 5.22 AU/ML
SARS-COV-2 IGG SERPL QL IA: NEGATIVE
SODIUM SERPL-SCNC: 143 MMOL/L
T3FREE SERPL-MCNC: 3.37 PG/ML
T4 FREE SERPL-MCNC: 1.9 NG/DL
TSH SERPL-ACNC: 0.03 UIU/ML
WBC # FLD AUTO: 5.01 K/UL

## 2020-09-29 PROCEDURE — 90686 IIV4 VACC NO PRSV 0.5 ML IM: CPT

## 2020-09-29 PROCEDURE — 94010 BREATHING CAPACITY TEST: CPT

## 2020-09-29 PROCEDURE — G0008: CPT

## 2020-09-29 PROCEDURE — 99215 OFFICE O/P EST HI 40 MIN: CPT | Mod: 25

## 2020-09-29 PROCEDURE — 36415 COLL VENOUS BLD VENIPUNCTURE: CPT

## 2020-09-29 PROCEDURE — 94762 N-INVAS EAR/PLS OXIMTRY CONT: CPT

## 2020-09-29 PROCEDURE — ZZZZZ: CPT

## 2020-09-29 PROCEDURE — 94726 PLETHYSMOGRAPHY LUNG VOLUMES: CPT

## 2020-09-29 PROCEDURE — 94729 DIFFUSING CAPACITY: CPT

## 2020-09-29 RX ORDER — DIGITAL THERAPEUTIC,REN DEVICE
MISCELLANEOUS MISCELLANEOUS
Qty: 1 | Refills: 6 | Status: ACTIVE | COMMUNITY
Start: 2020-09-29 | End: 1900-01-01

## 2020-09-29 NOTE — PHYSICAL EXAM
[No Acute Distress] : no acute distress [Normal Oropharynx] : normal oropharynx [III] : Mallampati Class: III [No Neck Mass] : no neck mass [Normal S1, S2] : normal s1, s2 [Clear to Auscultation Bilaterally] : clear to auscultation bilaterally [No Abnormalities] : no abnormalities [Benign] : benign [Normal Gait] : normal gait [No Clubbing] : no clubbing [Normal Color/ Pigmentation] : normal color/ pigmentation [No Focal Deficits] : no focal deficits [Oriented x3] : oriented x3

## 2020-09-29 NOTE — HISTORY OF PRESENT ILLNESS
[TextBox_4] : This letter  is regarding your patient  who  attended pulmonary out patient office today.  I have reviewed  patient's  past history, social history, family history and medication list. I also  reviewed nurse practitioners/ and fellows  notes and assessment and agree with it.  \par The patient was referred by ----s/p  2020--- developed PE--on Eliquis\par Very anxious,  \par ------No history of , fever, chills , rigors, chest pain, or hemoptysis. Questionable history of Raynaud's phenomenon. No h/o significant weight loss in last few months. No history of liver dysfunction , collagen vascular disorder or chronic thromboembolic disease. I would classify the patient's dyspnea as WHO  FUNCTIONAL CLASS II--------\par \par \par History of pulmonary embolism on Eliquis, PE developed after ----no past history of DVT no family history of thromboembolic phenomena\par ----Echo  date------\par ----Pft date-----2020----normal spirometry normal lung volumes decreased DLCO----\par ----Ct scan date---------bilateral PE\par \par

## 2020-09-29 NOTE — DISCUSSION/SUMMARY
[FreeTextEntry1] : ---Assessment plan----------The patient has been referred here for further opinion regarding pulmonary problem,\par \par 34-year-old female  in 2020 embolism on Eliquis\par \par 1 continue anti-coagulation-see dr geiger\par 2 PFT\par 3 physical visit in the office\par 4 echocardiogram 2 months\par 5-overnight pulse oximetry\par 6 flu vaccine\par \par \par Follow-up in 2 months\par Thanks for allowing  me to participate  in the care of this patient.  Patient at this time  will follow  the above mentioned recommendations and return back for follow up visit. If you have any questions  I can be reached  at #[phone] (beeper)  or  [phone] (office).\par \par Argenis Smith MD, FCCP \par Director, Pulmonary Hypertension Program \par [facility] \par Division of Pulmonary, Critical Care and Sleep Medicine \par  Professor of Medicine \par [employer]\par

## 2020-09-30 LAB
CARDIOLIPIN IGM SER-MCNC: 5.2 GPL
PROT C PPP CHRO-ACNC: 126 %
PROT S AG ACT/NOR PPP IA: 40 %

## 2020-10-01 LAB — CARDIOLIPIN AB SER IA-ACNC: NEGATIVE

## 2020-10-02 LAB — CARDIOLIPIN IGM SER-MCNC: 6.9 MPL

## 2020-10-03 ENCOUNTER — NON-APPOINTMENT (OUTPATIENT)
Age: 35
End: 2020-10-03

## 2020-10-04 LAB — PROT S FREE PPP-ACNC: 54 % NORMAL

## 2020-10-05 ENCOUNTER — APPOINTMENT (OUTPATIENT)
Dept: OBGYN | Facility: CLINIC | Age: 35
End: 2020-10-05
Payer: COMMERCIAL

## 2020-10-05 PROCEDURE — 0503F POSTPARTUM CARE VISIT: CPT

## 2020-10-13 ENCOUNTER — TRANSCRIPTION ENCOUNTER (OUTPATIENT)
Age: 35
End: 2020-10-13

## 2020-10-21 ENCOUNTER — OUTPATIENT (OUTPATIENT)
Dept: OUTPATIENT SERVICES | Facility: HOSPITAL | Age: 35
LOS: 1 days | Discharge: HOME | End: 2020-10-21

## 2020-10-21 DIAGNOSIS — Z51.5 ENCOUNTER FOR PALLIATIVE CARE: ICD-10-CM

## 2020-10-21 DIAGNOSIS — Z98.891 HISTORY OF UTERINE SCAR FROM PREVIOUS SURGERY: Chronic | ICD-10-CM

## 2020-10-22 ENCOUNTER — APPOINTMENT (OUTPATIENT)
Dept: PAIN MANAGEMENT | Facility: CLINIC | Age: 35
End: 2020-10-22
Payer: COMMERCIAL

## 2020-10-22 VITALS
WEIGHT: 161 LBS | HEART RATE: 69 BPM | DIASTOLIC BLOOD PRESSURE: 77 MMHG | SYSTOLIC BLOOD PRESSURE: 113 MMHG | HEIGHT: 58 IN | BODY MASS INDEX: 33.8 KG/M2

## 2020-10-22 VITALS — TEMPERATURE: 96.2 F

## 2020-10-22 PROCEDURE — 99214 OFFICE O/P EST MOD 30 MIN: CPT

## 2020-10-22 PROCEDURE — 99072 ADDL SUPL MATRL&STAF TM PHE: CPT

## 2020-10-22 NOTE — HISTORY OF PRESENT ILLNESS
[FreeTextEntry1] : Pt notes she is s/p delivery 2 months now.  Had infection, then PE and is still on Eliquis.  Had been told her genetic testing was negative and now seems likely the PE is related to infection and 7 days of immobility.\par Feels overall this pregnancy was better and wondering if Eliquis may be protective.\par Still can get 48 hr recurrence.\par Did have worsened  [Headache] : headache [Nausea] : nausea [Photophobia] : photophobia [Phonophobia] : phonophobia [Neck Pain] : neck pain [Scalp Tenderness] : scalp tenderness [> 4 hours] : > 4 hours [stayed the same] : stayed the same [4] : a minimum pain level of 4/10 [10] : a maximum pain level of 10/10 [Stable] : The patient reports ~his/her~ symptoms since the last visit are stable [de-identified] : variable

## 2020-10-22 NOTE — PHYSICAL EXAM
[General Appearance - Alert] : alert [General Appearance - In No Acute Distress] : in no acute distress [General Appearance - Well Nourished] : well nourished [General Appearance - Well Developed] : well developed [General Appearance - Well-Appearing] : healthy appearing [Oriented To Time, Place, And Person] : oriented to person, place, and time [Affect] : the affect was normal [Mood] : the mood was normal [Memory Recent] : recent memory was not impaired [Memory Remote] : remote memory was not impaired [Person] : oriented to person [Place] : oriented to place [Time] : oriented to time [Short Term Intact] : short term memory intact [Remote Intact] : remote memory intact [Concentration Intact] : normal concentrating ability [Visual Intact] : visual attention was ~T not ~L decreased [Fluency] : fluency intact [Comprehension] : comprehension intact [Current Events] : adequate knowledge of current events [Past History] : adequate knowledge of personal past history [Vocabulary] : adequate range of vocabulary [Cranial Nerves Oculomotor (III)] : extraocular motion intact [Cranial Nerves Facial (VII)] : face symmetrical [Cranial Nerves Vestibulocochlear (VIII)] : hearing was intact bilaterally [Cranial Nerves Glossopharyngeal (IX)] : tongue and palate midline [Cranial Nerves Accessory (XI - Cranial And Spinal)] : head turning and shoulder shrug symmetric [Cranial Nerves Hypoglossal (XII)] : there was no tongue deviation with protrusion [Motor Tone] : muscle tone was normal in all four extremities [Motor Strength] : muscle strength was normal in all four extremities [No Muscle Atrophy] : normal bulk in all four extremities [Motor Handedness Right-Handed] : the patient is right hand dominant [Motor Strength Upper Extremities Bilaterally] : strength was normal in both upper extremities [Motor Strength Lower Extremities Bilaterally] : strength was normal in both lower extremities [Sensation Tactile Decrease] : light touch was intact [Tremor] : no tremor present [Dysdiadochokinesia Bilaterally] : not present [Sclera] : the sclera and conjunctiva were normal [Outer Ear] : the ears and nose were normal in appearance [Neck Cervical Mass (___cm)] : no neck mass was observed [] : no respiratory distress [Exaggerated Use Of Accessory Muscles For Inspiration] : no accessory muscle use [Edema] : there was no peripheral edema [Abnormal Walk] : normal gait [Nail Clubbing] : no clubbing  or cyanosis of the fingernails [Involuntary Movements] : no involuntary movements were seen [Musculoskeletal - Swelling] : no joint swelling seen

## 2020-10-22 NOTE — ASSESSMENT
[FreeTextEntry1] : Consider Ubrelvy or Nurtec in the future\par continue with prn naratriptan for now\par consider adding nsaids back in to toolbox after stopping eliquis.

## 2020-10-23 ENCOUNTER — TRANSCRIPTION ENCOUNTER (OUTPATIENT)
Age: 35
End: 2020-10-23

## 2020-10-26 ENCOUNTER — APPOINTMENT (OUTPATIENT)
Dept: INTERNAL MEDICINE | Facility: CLINIC | Age: 35
End: 2020-10-26
Payer: COMMERCIAL

## 2020-10-26 VITALS
SYSTOLIC BLOOD PRESSURE: 120 MMHG | TEMPERATURE: 97.9 F | WEIGHT: 158 LBS | HEART RATE: 63 BPM | RESPIRATION RATE: 17 BRPM | BODY MASS INDEX: 33.17 KG/M2 | HEIGHT: 58 IN | DIASTOLIC BLOOD PRESSURE: 81 MMHG | OXYGEN SATURATION: 98 %

## 2020-10-26 PROCEDURE — 99385 PREV VISIT NEW AGE 18-39: CPT | Mod: 25

## 2020-10-26 PROCEDURE — 99072 ADDL SUPL MATRL&STAF TM PHE: CPT

## 2020-10-26 RX ORDER — LEVOTHYROXINE SODIUM 175 UG/1
175 TABLET ORAL
Qty: 90 | Refills: 0 | Status: DISCONTINUED | COMMUNITY
Start: 2020-04-03 | End: 2020-10-26

## 2020-10-27 ENCOUNTER — APPOINTMENT (OUTPATIENT)
Dept: HEMATOLOGY ONCOLOGY | Facility: CLINIC | Age: 35
End: 2020-10-27

## 2020-11-03 ENCOUNTER — INPATIENT (INPATIENT)
Facility: HOSPITAL | Age: 35
LOS: 2 days | Discharge: HOME | DRG: 392 | End: 2020-11-06
Attending: INTERNAL MEDICINE | Admitting: INTERNAL MEDICINE
Payer: COMMERCIAL

## 2020-11-03 ENCOUNTER — TRANSCRIPTION ENCOUNTER (OUTPATIENT)
Age: 35
End: 2020-11-03

## 2020-11-03 VITALS
SYSTOLIC BLOOD PRESSURE: 138 MMHG | TEMPERATURE: 98 F | HEART RATE: 80 BPM | WEIGHT: 156.09 LBS | HEIGHT: 63 IN | OXYGEN SATURATION: 100 % | RESPIRATION RATE: 20 BRPM | DIASTOLIC BLOOD PRESSURE: 88 MMHG

## 2020-11-03 DIAGNOSIS — Z98.891 HISTORY OF UTERINE SCAR FROM PREVIOUS SURGERY: Chronic | ICD-10-CM

## 2020-11-03 LAB
ALBUMIN SERPL ELPH-MCNC: 4.7 G/DL — SIGNIFICANT CHANGE UP (ref 3.3–5)
ALP SERPL-CCNC: 81 U/L — SIGNIFICANT CHANGE UP (ref 40–120)
ALT FLD-CCNC: 17 U/L — SIGNIFICANT CHANGE UP (ref 10–45)
ANION GAP SERPL CALC-SCNC: 12 MMOL/L — SIGNIFICANT CHANGE UP (ref 5–17)
APTT BLD: 35 SEC — SIGNIFICANT CHANGE UP (ref 27.5–35.5)
AST SERPL-CCNC: 21 U/L — SIGNIFICANT CHANGE UP (ref 10–40)
BASE EXCESS BLDV CALC-SCNC: 1.7 MMOL/L — SIGNIFICANT CHANGE UP (ref -2–2)
BASOPHILS # BLD AUTO: 0.03 K/UL — SIGNIFICANT CHANGE UP (ref 0–0.2)
BASOPHILS NFR BLD AUTO: 0.7 % — SIGNIFICANT CHANGE UP (ref 0–2)
BILIRUB SERPL-MCNC: 0.3 MG/DL — SIGNIFICANT CHANGE UP (ref 0.2–1.2)
BUN SERPL-MCNC: 10 MG/DL — SIGNIFICANT CHANGE UP (ref 7–23)
CA-I SERPL-SCNC: 1.19 MMOL/L — SIGNIFICANT CHANGE UP (ref 1.12–1.3)
CALCIUM SERPL-MCNC: 10 MG/DL — SIGNIFICANT CHANGE UP (ref 8.4–10.5)
CHLORIDE BLDV-SCNC: 110 MMOL/L — HIGH (ref 96–108)
CHLORIDE SERPL-SCNC: 106 MMOL/L — SIGNIFICANT CHANGE UP (ref 96–108)
CO2 BLDV-SCNC: 29 MMOL/L — SIGNIFICANT CHANGE UP (ref 22–30)
CO2 SERPL-SCNC: 23 MMOL/L — SIGNIFICANT CHANGE UP (ref 22–31)
CREAT SERPL-MCNC: 0.75 MG/DL — SIGNIFICANT CHANGE UP (ref 0.5–1.3)
EOSINOPHIL # BLD AUTO: 0.2 K/UL — SIGNIFICANT CHANGE UP (ref 0–0.5)
EOSINOPHIL NFR BLD AUTO: 4.9 % — SIGNIFICANT CHANGE UP (ref 0–6)
GAS PNL BLDV: 137 MMOL/L — SIGNIFICANT CHANGE UP (ref 135–145)
GAS PNL BLDV: SIGNIFICANT CHANGE UP
GAS PNL BLDV: SIGNIFICANT CHANGE UP
GLUCOSE BLDV-MCNC: 103 MG/DL — HIGH (ref 70–99)
GLUCOSE SERPL-MCNC: 100 MG/DL — HIGH (ref 70–99)
HCO3 BLDV-SCNC: 27 MMOL/L — SIGNIFICANT CHANGE UP (ref 21–29)
HCT VFR BLD CALC: 39.7 % — SIGNIFICANT CHANGE UP (ref 34.5–45)
HCT VFR BLDA CALC: 39 % — SIGNIFICANT CHANGE UP (ref 39–50)
HGB BLD CALC-MCNC: 12.6 G/DL — SIGNIFICANT CHANGE UP (ref 11.5–15.5)
HGB BLD-MCNC: 12.8 G/DL — SIGNIFICANT CHANGE UP (ref 11.5–15.5)
IMM GRANULOCYTES NFR BLD AUTO: 0.2 % — SIGNIFICANT CHANGE UP (ref 0–1.5)
INR BLD: 1.33 RATIO — HIGH (ref 0.88–1.16)
LACTATE BLDV-MCNC: 0.9 MMOL/L — SIGNIFICANT CHANGE UP (ref 0.7–2)
LIDOCAIN IGE QN: 26 U/L — SIGNIFICANT CHANGE UP (ref 7–60)
LYMPHOCYTES # BLD AUTO: 1.85 K/UL — SIGNIFICANT CHANGE UP (ref 1–3.3)
LYMPHOCYTES # BLD AUTO: 44.9 % — HIGH (ref 13–44)
MAGNESIUM SERPL-MCNC: 2 MG/DL — SIGNIFICANT CHANGE UP (ref 1.6–2.6)
MCHC RBC-ENTMCNC: 28.8 PG — SIGNIFICANT CHANGE UP (ref 27–34)
MCHC RBC-ENTMCNC: 32.2 GM/DL — SIGNIFICANT CHANGE UP (ref 32–36)
MCV RBC AUTO: 89.2 FL — SIGNIFICANT CHANGE UP (ref 80–100)
MONOCYTES # BLD AUTO: 0.29 K/UL — SIGNIFICANT CHANGE UP (ref 0–0.9)
MONOCYTES NFR BLD AUTO: 7 % — SIGNIFICANT CHANGE UP (ref 2–14)
NEUTROPHILS # BLD AUTO: 1.74 K/UL — LOW (ref 1.8–7.4)
NEUTROPHILS NFR BLD AUTO: 42.3 % — LOW (ref 43–77)
NRBC # BLD: 0 /100 WBCS — SIGNIFICANT CHANGE UP (ref 0–0)
NT-PROBNP SERPL-SCNC: 55 PG/ML — SIGNIFICANT CHANGE UP (ref 0–300)
PCO2 BLDV: 49 MMHG — SIGNIFICANT CHANGE UP (ref 35–50)
PH BLDV: 7.36 — SIGNIFICANT CHANGE UP (ref 7.35–7.45)
PLATELET # BLD AUTO: 256 K/UL — SIGNIFICANT CHANGE UP (ref 150–400)
PO2 BLDV: 23 MMHG — LOW (ref 25–45)
POTASSIUM BLDV-SCNC: 3.3 MMOL/L — LOW (ref 3.5–5.3)
POTASSIUM SERPL-MCNC: 3.7 MMOL/L — SIGNIFICANT CHANGE UP (ref 3.5–5.3)
POTASSIUM SERPL-SCNC: 3.7 MMOL/L — SIGNIFICANT CHANGE UP (ref 3.5–5.3)
PROT SERPL-MCNC: 7.3 G/DL — SIGNIFICANT CHANGE UP (ref 6–8.3)
PROTHROM AB SERPL-ACNC: 15.7 SEC — HIGH (ref 10.6–13.6)
RBC # BLD: 4.45 M/UL — SIGNIFICANT CHANGE UP (ref 3.8–5.2)
RBC # FLD: 13.4 % — SIGNIFICANT CHANGE UP (ref 10.3–14.5)
SAO2 % BLDV: 32 % — LOW (ref 67–88)
SODIUM SERPL-SCNC: 141 MMOL/L — SIGNIFICANT CHANGE UP (ref 135–145)
TROPONIN T, HIGH SENSITIVITY RESULT: <6 NG/L — SIGNIFICANT CHANGE UP (ref 0–51)
WBC # BLD: 4.12 K/UL — SIGNIFICANT CHANGE UP (ref 3.8–10.5)
WBC # FLD AUTO: 4.12 K/UL — SIGNIFICANT CHANGE UP (ref 3.8–10.5)

## 2020-11-03 PROCEDURE — 99285 EMERGENCY DEPT VISIT HI MDM: CPT

## 2020-11-03 PROCEDURE — 71275 CT ANGIOGRAPHY CHEST: CPT | Mod: 26

## 2020-11-03 PROCEDURE — 71046 X-RAY EXAM CHEST 2 VIEWS: CPT | Mod: 26

## 2020-11-03 PROCEDURE — 93010 ELECTROCARDIOGRAM REPORT: CPT

## 2020-11-03 RX ORDER — FAMOTIDINE 10 MG/ML
20 INJECTION INTRAVENOUS ONCE
Refills: 0 | Status: COMPLETED | OUTPATIENT
Start: 2020-11-03 | End: 2020-11-03

## 2020-11-03 RX ADMIN — Medication 30 MILLILITER(S): at 21:59

## 2020-11-03 RX ADMIN — FAMOTIDINE 20 MILLIGRAM(S): 10 INJECTION INTRAVENOUS at 22:37

## 2020-11-03 NOTE — ED PROVIDER NOTE - ATTENDING CONTRIBUTION TO CARE
I have personally performed a face to face medical and diagnostic evaluation of the patient. I have discussed with and reviewed the Resident's note and agree with the History, ROS, Physical Exam and MDM unless otherwise indicated. A brief summary of my personal evaluation and impression can be found below.    34F hx of hypothyroidism, placenta acreta recent delivery and hospitalization for uterine infection c/b b/l PE now on Eliquis presents with a cc of mild SOB BRICEÑO feeling as if cannot catch breath, and mild chest discomfort, no new LE swelling. Has also been c/o mild epigastric discomfort, no exertional sx, sx not worse after PO intake, described as epigastric thinks may be worse when having a bowel movement, noticed irregular bowel movements lately w/o bleeding. Reports went to  today there had pulmonolgis called isntructed to present to ED for eval w c/f evolution of PE and for echo to eval for heart strain. No other complaints.     All other ROS negative, except as above and as per HPI and ROS section.    VITALS: Initial triage and subsequent vitals have been reviewed by me.  GEN APPEARANCE: WDWN, alert, non-toxic, NAD  HEAD: Atraumatic.  EYES: PERRLa, EOMI, vision grossly intact.   NECK: Supple  CV: RRR, S1S2, no c/r/m/g. Cap refill <2 seconds. No bruits.   LUNGS: CTAB. No abnormal breath sounds.  ABDOMEN: Soft, NTND. No guarding or rebound.   MSK/EXT: No spinal or extremity point tenderness. No CVA ttp. Pelvis stable. No peripheral edema.  NEURO: Alert, follows commands. Weight bearing normal. Speech normal. Sensation and motor normal x4 extremities.   SKIN: Warm, dry and intact. No rash.  PSYCH: Appropriate    Plan/MDM: 34M recent dx of b/l PE on AC presents with a cc of SOB BRICEÑO chest discomfort, instructed by pulmonologist to present to ED for eval w c/f possible evolution of PE. Exam vss non toxic non focal exam, ddx c/f PE vs less likely cardiac etiology, vs low c/f intraabdominal surgical pathology, epigastric discomfort consider gerd vs panc, non tender exam, no fever, less likely acute cholecystitis. re-discuss with pulm/pmd after results. I have personally performed a face to face medical and diagnostic evaluation of the patient. I have discussed with and reviewed the Resident's note and agree with the History, ROS, Physical Exam and MDM unless otherwise indicated. A brief summary of my personal evaluation and impression can be found below.    34F hx of hypothyroidism, placenta acreta recent delivery and hospitalization for uterine infection c/b b/l PE now on Eliquis presents with a cc of mild SOB BRICEÑO feeling as if cannot catch breath, and mild chest discomfort, no new LE swelling. Has also been c/o mild epigastric discomfort, no exertional sx, sx not worse after PO intake, described as epigastric thinks may be worse when having a bowel movement, noticed irregular bowel movements lately w/o bleeding. Reports went to  today there had pulmonologist called instructed to present to ED for eval w c/f evolution of PE and for echo to eval for heart strain. No other complaints.     All other ROS negative, except as above and as per HPI and ROS section.    VITALS: Initial triage and subsequent vitals have been reviewed by me.  GEN APPEARANCE: WDWN, alert, non-toxic, NAD  HEAD: Atraumatic.  EYES: PERRLa, EOMI, vision grossly intact.   NECK: Supple  CV: RRR, S1S2, no c/r/m/g. Cap refill <2 seconds. No bruits.   LUNGS: CTAB. No abnormal breath sounds.  ABDOMEN: Soft, NTND. No guarding or rebound.   MSK/EXT: No spinal or extremity point tenderness. No CVA ttp. Pelvis stable. No peripheral edema.  NEURO: Alert, follows commands. Weight bearing normal. Speech normal. Sensation and motor normal x4 extremities.   SKIN: Warm, dry and intact. No rash.  PSYCH: Appropriate    Plan/MDM: 34M recent dx of b/l PE on AC presents with a cc of SOB BRICEÑO chest discomfort, instructed by pulmonologist to present to ED for eval w c/f possible evolution of PE. Exam vss non toxic non focal exam, ddx c/f PE vs less likely cardiac etiology, vs low c/f intraabdominal surgical pathology, epigastric discomfort consider gerd vs panc, non tender exam, no fever, less likely acute cholecystitis. re-discuss with pulm/pmd after results.

## 2020-11-03 NOTE — ED PROVIDER NOTE - PROGRESS NOTE DETAILS
Jadyn DWYER: discussed with pulmonolgy recommend admission for formal eecho to eval for right heart strain, however CTA performed prior to myself seeing pt. Dr. Briones made aware of pt in ED, given that pt had repeat CT, will discuss with pulm / Anna pending results, consider discharge PE stable no c/f R heart strain,

## 2020-11-03 NOTE — ED PROVIDER NOTE - NS ED ROS FT
REVIEW OF SYSTEMS:  CONSTITUTIONAL: No weakness, fevers or chills  EYES/ENT: No visual changes;  No vertigo or throat pain   NECK: No pain or stiffness  RESPIRATORY: + dyspnea   CARDIOVASCULAR: + chest pressure   GASTROINTESTINAL: + abd pain   GENITOURINARY: No dysuria, frequency or hematuria  NEUROLOGICAL: No numbness or weakness  SKIN: No itching, burning, rashes, or lesions   All other review of systems is negative unless indicated above.

## 2020-11-03 NOTE — ED ADULT NURSE NOTE - OBJECTIVE STATEMENT
34 year old female presenting to ED c/o chest pain. PMH includes , delivered third child 10 weeks ago, found to have PE 8 weeks ago, hypothyroidism. Pt A+Ox3, moves all four extremities, reports constant chest pain, BRICEÑO, and diffuse abdominal pain during bowel movement since Thursday. Pt denies alleviating or exacerbating factors, and denies taking medication. Pt denies headache, dizziness/lightheadedness, N/V, urinary or bowel symptoms, numbness/tingling, fevers or chills. EKG completed, pt attached to cardiac monitor.

## 2020-11-03 NOTE — ED ADULT NURSE NOTE - CHIEF COMPLAINT QUOTE
Dx PE 9/10. chest tightness  since last thursday. Sob "somewhat like a gastro-pain" Denies fever or cough. Currently on Eliquis.

## 2020-11-03 NOTE — ED PROVIDER NOTE - RAPID ASSESSMENT
34y F with PMHx of Recent PE dx 9/10/2020 (taking Eliquis),  10 wks ago (w. placenta eccreda) presents to the ED c/o midsternal chest tightness/burning with SOB x 5d ago. Spoke to on call Pulmonologist, told to come to ED for CT scan to assess status of blood clots. Had 1 week hospitalization in 2020 for uterine infection S/P . Denies fever,  cough, leg swelling, or recent travel. Allergic to ASA and PCN.    Alvarado Howard MD note: The scribe's documentation has been prepared under my direction and personally reviewed by me.  Patient was seen as a tele QDOC patient, a physical exam was not performed as there is no physical exam room available the patient will be seen and further worked up in the main emergency department and their care will be completed by the main emergency department team. Receiving team will follow up on labs, analgesia, any clinical imaging, reassess and disposition as clinically indicated, all decisions regarding the progression of care will be made at their discretion. 34y F with PMHx of Recent PE dx 9/10/2020 (taking Eliquis),  10 wks ago (w. placenta accreta) presents to the ED c/o midsternal chest tightness/burning with SOB x 5d ago. Spoke to on call Pulmonologist, told to come to ED for CT scan to assess status of blood clots. Had 1 week hospitalization in 2020 for uterine infection S/P . Denies fever,  cough, leg swelling, or recent travel. Allergic to ASA and PCN.    Alvarado Howard MD note: The scribe's documentation has been prepared under my direction and personally reviewed by me.  Patient was seen as a tele QDOC patient, a physical exam was not performed as there is no physical exam room available the patient will be seen and further worked up in the main emergency department and their care will be completed by the main emergency department team. Receiving team will follow up on labs, analgesia, any clinical imaging, reassess and disposition as clinically indicated, all decisions regarding the progression of care will be made at their discretion.

## 2020-11-03 NOTE — ED ADULT TRIAGE NOTE - CHIEF COMPLAINT QUOTE
Dx PE 9/10. chest tightness  since last thursday. Sob "somewhat like a gstro-pain" Dx PE 9/10. chest tightness  since last thursday. Sob "somewhat like a gstro-pain" Denies fever or cough. Currently on Eliquis. Dx PE 9/10. chest tightness  since last thursday. Sob "somewhat like a gastro-pain" Denies fever or cough. Currently on Eliquis.

## 2020-11-03 NOTE — ED PROVIDER NOTE - PHYSICAL EXAMINATION
T(C): 36.8 (11-03-20 @ 22:31), Max: 36.9 (11-03-20 @ 21:31)  HR: 70 (11-03-20 @ 22:31) (70 - 80)  BP: 124/84 (11-03-20 @ 22:31) (124/84 - 138/88)  RR: 20 (11-03-20 @ 22:31) (20 - 20)  SpO2: 98% (11-03-20 @ 22:31) (98% - 100%)  PHYSICAL EXAM:  GENERAL: NAD, well-developed  HEAD:  Atraumatic, Normocephalic  EYES: EOMI, PERRLA, conjunctiva and sclera clear  NECK: Supple, No JVD  CHEST/LUNG: Clear to auscultation bilaterally; No wheeze  HEART: Regular rate and rhythm; No murmurs, rubs, or gallops  ABDOMEN: Soft, Nontender, Nondistended; Bowel sounds present  EXTREMITIES:, No clubbing, cyanosis, or edema  PSYCH: AAOx3  NEUROLOGY: non-focal  SKIN: No rashes or lesions

## 2020-11-03 NOTE — ED PROVIDER NOTE - OBJECTIVE STATEMENT
34 year old female with PMH of hypothyroidism, placenta acreta recent hospitalization for uterine infection with findings fo b/l acute & subsegmental PE's 8 weeks ago. Patient states she has been feeling generally unwell since this past thursday. She expresses chest pressure, dyspnea and abd discomfort. Her chest pressure is constant mainly on her left side. She has been mobile and complicant with her eliquis 5 mg BID. She called Dr. Smith's office and an the on call pulmonologist recommended admission for monitoring of vital signs and formal echo to rule out right heart strain. Patient has outpatient eval for hematological genetic mutations which did not demonstrate any genetic mutations. Patient denies fever, chills, nausea. none

## 2020-11-04 DIAGNOSIS — I26.99 OTHER PULMONARY EMBOLISM WITHOUT ACUTE COR PULMONALE: ICD-10-CM

## 2020-11-04 DIAGNOSIS — R07.89 OTHER CHEST PAIN: ICD-10-CM

## 2020-11-04 DIAGNOSIS — E03.9 HYPOTHYROIDISM, UNSPECIFIED: ICD-10-CM

## 2020-11-04 LAB
APPEARANCE UR: CLEAR — SIGNIFICANT CHANGE UP
BACTERIA # UR AUTO: ABNORMAL
BILIRUB UR-MCNC: NEGATIVE — SIGNIFICANT CHANGE UP
COLOR SPEC: SIGNIFICANT CHANGE UP
DIFF PNL FLD: NEGATIVE — SIGNIFICANT CHANGE UP
EPI CELLS # UR: 2 /HPF — SIGNIFICANT CHANGE UP
GLUCOSE UR QL: NEGATIVE — SIGNIFICANT CHANGE UP
HYALINE CASTS # UR AUTO: 0 /LPF — SIGNIFICANT CHANGE UP (ref 0–2)
KETONES UR-MCNC: NEGATIVE — SIGNIFICANT CHANGE UP
LEUKOCYTE ESTERASE UR-ACNC: ABNORMAL
NITRITE UR-MCNC: NEGATIVE — SIGNIFICANT CHANGE UP
PH UR: 7 — SIGNIFICANT CHANGE UP (ref 5–8)
PROT UR-MCNC: NEGATIVE — SIGNIFICANT CHANGE UP
RBC CASTS # UR COMP ASSIST: 2 /HPF — SIGNIFICANT CHANGE UP (ref 0–4)
SARS-COV-2 RNA SPEC QL NAA+PROBE: SIGNIFICANT CHANGE UP
SP GR SPEC: 1.03 — HIGH (ref 1.01–1.02)
UROBILINOGEN FLD QL: NEGATIVE — SIGNIFICANT CHANGE UP
WBC UR QL: 20 /HPF — HIGH (ref 0–5)

## 2020-11-04 PROCEDURE — 99223 1ST HOSP IP/OBS HIGH 75: CPT

## 2020-11-04 RX ORDER — FERROUS SULFATE 325(65) MG
325 TABLET ORAL DAILY
Refills: 0 | Status: DISCONTINUED | OUTPATIENT
Start: 2020-11-04 | End: 2020-11-06

## 2020-11-04 RX ORDER — FAMOTIDINE 10 MG/ML
20 INJECTION INTRAVENOUS DAILY
Refills: 0 | Status: DISCONTINUED | OUTPATIENT
Start: 2020-11-04 | End: 2020-11-05

## 2020-11-04 RX ORDER — LEVOTHYROXINE SODIUM 125 MCG
150 TABLET ORAL DAILY
Refills: 0 | Status: DISCONTINUED | OUTPATIENT
Start: 2020-11-04 | End: 2020-11-04

## 2020-11-04 RX ORDER — LEVOTHYROXINE SODIUM 125 MCG
112 TABLET ORAL DAILY
Refills: 0 | Status: DISCONTINUED | OUTPATIENT
Start: 2020-11-04 | End: 2020-11-06

## 2020-11-04 RX ORDER — APIXABAN 2.5 MG/1
5 TABLET, FILM COATED ORAL EVERY 12 HOURS
Refills: 0 | Status: DISCONTINUED | OUTPATIENT
Start: 2020-11-04 | End: 2020-11-06

## 2020-11-04 RX ADMIN — FAMOTIDINE 20 MILLIGRAM(S): 10 INJECTION INTRAVENOUS at 14:36

## 2020-11-04 RX ADMIN — Medication 325 MILLIGRAM(S): at 14:36

## 2020-11-04 RX ADMIN — Medication 112 MICROGRAM(S): at 11:34

## 2020-11-04 RX ADMIN — APIXABAN 5 MILLIGRAM(S): 2.5 TABLET, FILM COATED ORAL at 22:50

## 2020-11-04 RX ADMIN — APIXABAN 5 MILLIGRAM(S): 2.5 TABLET, FILM COATED ORAL at 11:34

## 2020-11-04 NOTE — H&P ADULT - PROBLEM SELECTOR PLAN 1
-initial concerns were for worsening PE/heart strain, CT angio done on admission shows no saddle or large PE, unable to evaluate segmental arteries  -echo is pending to eval for R heart strain  -as symptoms improved with famotidine, will continue for now  -likely heartburn related pain, and less likely due to worsening PE  -GI consult given severity of symptoms   -patient remains normoxic on RA, continue to monitor respiratory status

## 2020-11-04 NOTE — H&P ADULT - ASSESSMENT
34 year old female with recent diagnosis of pulmonary embolism (on Eliquis as an outpatient), s/p  10 weeks ago (w/ placenta accreta and endometritis), presenting with chest pain and tightness associated with a burning sensation.

## 2020-11-04 NOTE — ED ADULT NURSE REASSESSMENT NOTE - NS ED NURSE REASSESS COMMENT FT1
Pt A+Ox3, VSS, aware of plan of care, admitted to medicine for pulmonary embolism. Report given to LEIF Tate on 9 Monti.

## 2020-11-04 NOTE — ED ADULT NURSE REASSESSMENT NOTE - NS ED NURSE REASSESS COMMENT FT1
Pt A+Ox3, VSS, covid swab collected and sent ot lab. Pt aware of plan of care, admitted to medicine for pulmonary embolism, pending admission bed.

## 2020-11-04 NOTE — H&P ADULT - HISTORY OF PRESENT ILLNESS
34 year old female with recent diagnosis of pulmonary embolism (on Eliquis as an outpatient), s/p  10 weeks ago (w/ placenta accreta and endometritis), presenting with chest pain and tightness associated with a burning sensation. The patient had been on AC since the previous discharge and was following up with her Pulmonologist. She reports ongoing midsternal chest pain that is non-radiating and not associated with exertion or with food intake. At times it is a burning sensation and she feels that it travels up the chest to the back of her throat. She denies any palpitations, night sweats, fevers, chills, cough, hemoptysis, nausea, vomiting, diarrhea. She notes that sometimes the chest discomfort is relieved after having a bowel movement.     The patient states that she felt better after receiving the IV famotidine in the ER yesterday. Denies melena or hematochezia. No other complaints.

## 2020-11-05 ENCOUNTER — TRANSCRIPTION ENCOUNTER (OUTPATIENT)
Age: 35
End: 2020-11-05

## 2020-11-05 LAB
25(OH)D3 SERPL-MCNC: 35 NG/ML
ALBUMIN SERPL ELPH-MCNC: 4.6 G/DL
ALP BLD-CCNC: 87 U/L
ALT SERPL-CCNC: 11 U/L
ANION GAP SERPL CALC-SCNC: 12 MMOL/L — SIGNIFICANT CHANGE UP (ref 5–17)
ANION GAP SERPL CALC-SCNC: 14 MMOL/L
APPEARANCE: CLEAR
AST SERPL-CCNC: 17 U/L
BASOPHILS # BLD AUTO: 0.03 K/UL
BASOPHILS NFR BLD AUTO: 0.8 %
BILIRUB SERPL-MCNC: 0.3 MG/DL
BILIRUBIN URINE: NEGATIVE
BLOOD URINE: NEGATIVE
BUN SERPL-MCNC: 11 MG/DL
BUN SERPL-MCNC: 12 MG/DL — SIGNIFICANT CHANGE UP (ref 7–23)
CALCIUM SERPL-MCNC: 9.9 MG/DL
CALCIUM SERPL-MCNC: 9.9 MG/DL — SIGNIFICANT CHANGE UP (ref 8.4–10.5)
CHLORIDE SERPL-SCNC: 106 MMOL/L
CHLORIDE SERPL-SCNC: 108 MMOL/L — SIGNIFICANT CHANGE UP (ref 96–108)
CO2 SERPL-SCNC: 21 MMOL/L — LOW (ref 22–31)
CO2 SERPL-SCNC: 23 MMOL/L
COLOR: NORMAL
CREAT SERPL-MCNC: 0.74 MG/DL — SIGNIFICANT CHANGE UP (ref 0.5–1.3)
CREAT SERPL-MCNC: 0.8 MG/DL
EOSINOPHIL # BLD AUTO: 0.3 K/UL
EOSINOPHIL NFR BLD AUTO: 7.9 %
ESTIMATED AVERAGE GLUCOSE: 85 MG/DL
GLUCOSE QUALITATIVE U: NEGATIVE
GLUCOSE SERPL-MCNC: 74 MG/DL
GLUCOSE SERPL-MCNC: 87 MG/DL — SIGNIFICANT CHANGE UP (ref 70–99)
HBA1C MFR BLD HPLC: 4.6 %
HCT VFR BLD CALC: 39.4 %
HCT VFR BLD CALC: 41.5 % — SIGNIFICANT CHANGE UP (ref 34.5–45)
HGB BLD-MCNC: 11.7 G/DL
HGB BLD-MCNC: 12.7 G/DL — SIGNIFICANT CHANGE UP (ref 11.5–15.5)
IMM GRANULOCYTES NFR BLD AUTO: 0 %
KETONES URINE: NEGATIVE
LEUKOCYTE ESTERASE URINE: NEGATIVE
LYMPHOCYTES # BLD AUTO: 1.68 K/UL
LYMPHOCYTES NFR BLD AUTO: 44.4 %
MAN DIFF?: NORMAL
MCHC RBC-ENTMCNC: 28.2 PG — SIGNIFICANT CHANGE UP (ref 27–34)
MCHC RBC-ENTMCNC: 28.5 PG
MCHC RBC-ENTMCNC: 29.7 GM/DL
MCHC RBC-ENTMCNC: 30.6 GM/DL — LOW (ref 32–36)
MCV RBC AUTO: 92.2 FL — SIGNIFICANT CHANGE UP (ref 80–100)
MCV RBC AUTO: 95.9 FL
MONOCYTES # BLD AUTO: 0.27 K/UL
MONOCYTES NFR BLD AUTO: 7.1 %
NEUTROPHILS # BLD AUTO: 1.5 K/UL
NEUTROPHILS NFR BLD AUTO: 39.8 %
NITRITE URINE: NEGATIVE
NRBC # BLD: 0 /100 WBCS — SIGNIFICANT CHANGE UP (ref 0–0)
PH URINE: 6
PLATELET # BLD AUTO: 229 K/UL — SIGNIFICANT CHANGE UP (ref 150–400)
PLATELET # BLD AUTO: 276 K/UL
POTASSIUM SERPL-MCNC: 4.1 MMOL/L — SIGNIFICANT CHANGE UP (ref 3.5–5.3)
POTASSIUM SERPL-SCNC: 4.1 MMOL/L — SIGNIFICANT CHANGE UP (ref 3.5–5.3)
POTASSIUM SERPL-SCNC: 4.3 MMOL/L
PROT SERPL-MCNC: 6.8 G/DL
PROTEIN URINE: NEGATIVE
RBC # BLD: 4.11 M/UL
RBC # BLD: 4.5 M/UL — SIGNIFICANT CHANGE UP (ref 3.8–5.2)
RBC # FLD: 13.4 % — SIGNIFICANT CHANGE UP (ref 10.3–14.5)
RBC # FLD: 13.8 %
SARS-COV-2 IGG SERPL QL IA: NEGATIVE — SIGNIFICANT CHANGE UP
SARS-COV-2 IGM SERPL IA-ACNC: 0.12 INDEX — SIGNIFICANT CHANGE UP
SODIUM SERPL-SCNC: 141 MMOL/L — SIGNIFICANT CHANGE UP (ref 135–145)
SODIUM SERPL-SCNC: 144 MMOL/L
SPECIFIC GRAVITY URINE: 1.02
T4 FREE SERPL-MCNC: 2 NG/DL
THYROGLOB AB SERPL-ACNC: 62.8 IU/ML
THYROPEROXIDASE AB SERPL IA-ACNC: 59.6 IU/ML
TSH SERPL-ACNC: 0.01 UIU/ML
UROBILINOGEN URINE: NORMAL
WBC # BLD: 3.23 K/UL — LOW (ref 3.8–10.5)
WBC # FLD AUTO: 3.23 K/UL — LOW (ref 3.8–10.5)
WBC # FLD AUTO: 3.78 K/UL

## 2020-11-05 PROCEDURE — 93306 TTE W/DOPPLER COMPLETE: CPT | Mod: 26

## 2020-11-05 RX ORDER — PANTOPRAZOLE SODIUM 20 MG/1
1 TABLET, DELAYED RELEASE ORAL
Qty: 30 | Refills: 0
Start: 2020-11-05 | End: 2020-12-04

## 2020-11-05 RX ORDER — SUCRALFATE 1 G
1 TABLET ORAL
Qty: 120 | Refills: 0
Start: 2020-11-05 | End: 2020-12-04

## 2020-11-05 RX ORDER — PANTOPRAZOLE SODIUM 20 MG/1
40 TABLET, DELAYED RELEASE ORAL
Refills: 0 | Status: DISCONTINUED | OUTPATIENT
Start: 2020-11-05 | End: 2020-11-06

## 2020-11-05 RX ORDER — ASCORBIC ACID 60 MG
500 TABLET,CHEWABLE ORAL DAILY
Refills: 0 | Status: DISCONTINUED | OUTPATIENT
Start: 2020-11-05 | End: 2020-11-06

## 2020-11-05 RX ORDER — SUCRALFATE 1 G
1 TABLET ORAL
Refills: 0 | Status: DISCONTINUED | OUTPATIENT
Start: 2020-11-05 | End: 2020-11-06

## 2020-11-05 RX ADMIN — APIXABAN 5 MILLIGRAM(S): 2.5 TABLET, FILM COATED ORAL at 11:10

## 2020-11-05 RX ADMIN — APIXABAN 5 MILLIGRAM(S): 2.5 TABLET, FILM COATED ORAL at 22:08

## 2020-11-05 RX ADMIN — Medication 325 MILLIGRAM(S): at 13:06

## 2020-11-05 RX ADMIN — Medication 112 MICROGRAM(S): at 05:16

## 2020-11-05 RX ADMIN — Medication 500 MILLIGRAM(S): at 17:06

## 2020-11-05 RX ADMIN — Medication 30 MILLILITER(S): at 17:06

## 2020-11-05 NOTE — DISCHARGE NOTE PROVIDER - NSDCQMERRANDS_GEN_ALL_CORE
19  Alejo Bowens : 1983 Sex: female  Age 28 y.o. Subjective:  Chief Complaint   Patient presents with    Weight Loss    Manic Behavior         HPI:   Alejo Bowens , 28 y.o. female presents to [facility] care for evaluation of depression, anxiety, weight loss, and nausea. The patient has been off of all of her psychiatric medications for the last 3 months. The patient states that she recently got a new job by drug [employer] and do not want to have them tested for drugs and it turned to be positive. The patient is not having any chest pain, shortness of breath. The patient feels at times that her heart rate becomes increasingly fast.  The patient is relatively anxious at this point. The patient does not want to go back on her psychotropics. The patient states that she does not feel comfortable going back on those. The patient does not want to go to the hospital to be evaluated. The patient is not suicidal or homicidal.  The patient is not having any hallucinations. The patient is not had any fevers or chills. The patient does not have a urinary symptoms. ROS:   Unless otherwise stated in this report the patient's positive and negative responses for review of systems for constitutional, eyes, ENT, cardiovascular, respiratory, gastrointestinal, neurological, , musculoskeletal, and integument systems and related systems to the presenting problem are either stated in the history of present illness or were not pertinent or were negative for the symptoms and/or complaints related to the presenting medical problem. Positives and pertinent negatives as per HPI. All others reviewed and are negative.       PMH:     Past Medical History:   Diagnosis Date    Anemia     ETOH abuse        Past Surgical History:   Procedure Laterality Date    PELVIC FRACTURE SURGERY Bilateral      Medications:     Current Outpatient Medications:     ondansetron (ZOFRAN-ODT) 4 MG disintegrating tablet, Take 1 tablet by mouth 3 times daily as needed for Nausea or Vomiting, Disp: 21 tablet, Rfl: 0    hydrOXYzine (VISTARIL) 25 MG capsule, Take 1 capsule by mouth 3 times daily as needed for Anxiety, Disp: 15 capsule, Rfl: 0    divalproex (DEPAKOTE ER) 500 MG extended release tablet, Take 2 tablets by mouth daily, Disp: 30 tablet, Rfl: 0    PARoxetine (PAXIL) 20 MG tablet, Take 1 tablet by mouth daily, Disp: 30 tablet, Rfl: 0    naltrexone (DEPADE) 50 MG tablet, Take 1 tablet by mouth daily, Disp: 30 tablet, Rfl: 0    QUEtiapine (SEROQUEL) 100 MG tablet, Take 1 tablet by mouth nightly, Disp: 60 tablet, Rfl: 0    disulfiram (ANTABUSE) 250 MG tablet, Take 1 tablet by mouth daily, Disp: 30 tablet, Rfl: 0    nicotine (NICODERM CQ) 14 MG/24HR, Place 1 patch onto the skin daily, Disp: 30 patch, Rfl: 0    traZODone (DESYREL) 50 MG tablet, Take 0.5 tablets by mouth nightly, Disp: 15 tablet, Rfl: 0    vitamin B-1 100 MG tablet, Take 1 tablet by mouth daily, Disp: 30 tablet, Rfl: 0    folic acid (FOLVITE) 1 MG tablet, Take 1 tablet by mouth daily. , Disp: 30 tablet, Rfl: 1    MedroxyPROGESTERone Acetate (DEPO-PROVERA IM), Inject  into the muscle See Admin Instructions. Every 3 months , Disp: , Rfl:     Allergies:   No Known Allergies    Social History:     Social History     Tobacco Use    Smoking status: Current Every Day Smoker     Packs/day: 1.00     Years: 15.00     Pack years: 15.00     Types: Cigarettes     Start date: 10/3/2003    Smokeless tobacco: Former User   Substance Use Topics    Alcohol use:  Yes     Alcohol/week: 42.0 oz     Types: 70 Cans of beer per week     Comment: daily alcohol use    Drug use: Yes     Frequency: 1.0 times per week     Types: Marijuana     Comment: stopped smoking 3 weeks ago       Physical Exam:     Vitals:    06/24/19 1644 06/24/19 1659   BP: 114/70    Pulse: 120 105   Resp: 16    Temp: 98 °F (36.7 °C)    TempSrc: Temporal    SpO2: 99%    Weight: 88 lb (39.9 kg)    Height: 5' 3\" (1.6 m)        Exam:  Physical Exam  Nurses note and vital signs reviewed and patient is not hypoxic. General: The patient appears well and in no apparent distress. Patient is resting comfortably on cart. Skin: Warm, dry, no pallor noted. There is no rash noted. Head: Normocephalic, atraumatic. Eye: Normal conjunctiva  Ears, Nose, Mouth, and Throat: Oral mucosa is moist  Cardiovascular: Regular tachycardia  Respiratory: Patient is in no distress, no accessory muscle use, lungs are clear to auscultation, no wheezing, crackles or rhonchi  Back: Non-tender, no CVA tenderness bilaterally to percussion. GI: Normal bowel sounds, no tenderness to palpation, no masses appreciated. No rebound, guarding, or rigidity noted. Musculoskeletal: The patient has no evidence of calf tenderness, no pitting edema, symmetrical pulses noted bilaterally  Neurological: A&O x4, normal speech  Psychiatric: Cooperative, tearful at times, anxious, she is not suicidal homicidal, no hallucinations        Testing:           Medical Decision Making:     The patient does not appear to be in any apparent distress or discomfort. The patient is not having any hallucinations. She is not suicidal homicidal.  Patient was inquiring about something to help her relax that was a nonaddictive medication as well as something that would help her sleep. The.  Patient states that she does not want to go back on her medications. I offered to have her get a follow-up appointment with her primary care physician here, Dr. Richar Doherty, the patient refused. The patient additionally does not want to go back to see her psychiatrist at the facility in [facility] because \"he does not know what he is doing. \"    The patient is not suicidal or homicidal.  The patient has no indication for pink slip. She is not intoxicated. The patient had good insight into her condition. The patient will be given a prescription for Vistaril and for Zofran.   Patient was encouraged to follow-up with psychiatry and discuss alternative treatments as well as her primary care physician. The patient understands the plan has no other questions at this time. Clinical Impression:   Sangeeta Anderson was seen today for weight loss and manic behavior. Diagnoses and all orders for this visit:    Depression, unspecified depression type    Nausea    Anxiety    Other orders  -     ondansetron (ZOFRAN-ODT) 4 MG disintegrating tablet; Take 1 tablet by mouth 3 times daily as needed for Nausea or Vomiting  -     hydrOXYzine (VISTARIL) 25 MG capsule; Take 1 capsule by mouth 3 times daily as needed for Anxiety        The patient is to call for any concerns or return if any of the signs or symptoms worsen. The patient is to follow-up with PCP in the next 2-3 days for repeat evaluation repeat assessment or go directly to the emergency department.      SIGNATURE: Diogenes Campoverde III, PA-C No

## 2020-11-05 NOTE — DISCHARGE NOTE PROVIDER - NSDCMRMEDTOKEN_GEN_ALL_CORE_FT
apixaban 5 mg oral tablet: 2 tab(s) orally 2 times a day x 7 days  then 5 mg twice daily x 30 days     ferrous sulfate 325 mg (65 mg elemental iron) oral tablet: 1 tab(s) orally once a day  Synthroid 150 mcg (0.15 mg) oral tablet: 1 tab(s) orally once a day    NOTE: BRAND only, as per pt and pharmacy.   apixaban 5 mg oral tablet: 2 tab(s) orally 2 times a day x 7 days  then 5 mg twice daily x 30 days     ferrous sulfate 325 mg (65 mg elemental iron) oral tablet: 1 tab(s) orally once a day  Protonix 40 mg oral delayed release tablet: 1 tab(s) orally once a day   sucralfate 1 g oral tablet: 1 tab(s) orally 4 times a day  Synthroid 150 mcg (0.15 mg) oral tablet: 1 tab(s) orally once a day    NOTE: BRAND only, as per pt and pharmacy.

## 2020-11-05 NOTE — DISCHARGE NOTE PROVIDER - HOSPITAL COURSE
34 year old female with recent diagnosis of pulmonary embolism (on Eliquis as an outpatient), s/p  10 weeks ago (w/ placenta accreta and endometritis), presenting with chest pain and tightness associated with a burning sensation. Initial concerns were for worsening PE/heart strain, CT angio done on admission shows no saddle or large PE, unable to evaluate segmental arteries. Echo is negative for R heart strain. Chest pain is likely 2/2 heartburn related pain, and less likely due to worsening PE. Patient remains stable for DC with close follow up with PCP as per Dr. Castillo. 34 year old female with recent diagnosis of pulmonary embolism (on Eliquis as an outpatient), s/p  10 weeks ago (w/ placenta accreta and endometritis), presenting with chest pain and tightness associated with a burning sensation. Initial concerns were for worsening PE/heart strain, CT angio done on admission shows no saddle or large PE, unable to evaluate segmental arteries. Echo is negative for R heart strain. Chest pain is likely 2/2 heartburn related pain, and less likely due to worsening PE. Patient remains stable for DC with close follow up with PCP as per Dr. Castillo and GI .

## 2020-11-05 NOTE — DISCHARGE NOTE PROVIDER - CARE PROVIDER_API CALL
Argenis Smith  CRITICAL CARE MEDICINE  [address], [address]  [place] [address]  Phone: [phone]  Fax: [phone]  Follow Up Time:

## 2020-11-05 NOTE — DISCHARGE NOTE PROVIDER - NSDCCPCAREPLAN_GEN_ALL_CORE_FT
PRINCIPAL DISCHARGE DIAGNOSIS  Diagnosis: Pulmonary embolism  Assessment and Plan of Treatment: Take your anticoagulation as directed.  Follow up with your health care provider within one week. Call for appointment.  If you develop shortness of breath or if your shortness of breath worsens call your Health Care Provider or go to the Emergency Department.        SECONDARY DISCHARGE DIAGNOSES  Diagnosis: Chest discomfort  Assessment and Plan of Treatment: Chest discomfort is likely secondary to gastric acid reflux.   HOME CARE INSTRUCTIONS  Change the factors that you can control. Ask your caregiver for guidance concerning weight loss, quitting smoking, and alcohol consumption.  Avoid foods and drinks that make your symptoms worse, such as:   Caffeine or alcoholic drinks.   Chocolate.   Peppermint or mint flavorings.  Garlic and onions.  Spicy foods.   Citrus fruits, such as oranges, zacarias, or limes.   Tomato-based foods such as sauce, chili, salsa, and pizza.  Fried and fatty foods.  Avoid lying down for the 3 hours prior to your bedtime or prior to taking a nap.  Eat small, frequent meals instead of large meals.   Wear loose-fitting clothing. Do not wear anything tight around your waist that causes pressure on your stomach.  Raise the head of your bed 6 to 8 inches with wood blocks to help you sleep. Extra pillows will not help.  Only take over-the-counter or prescription medicines for pain, discomfort, or fever as directed by your caregiver.   Do not take aspirin, ibuprofen, or other nonsteroidal anti-inflammatory drugs (NSAIDs).  SEEK IMMEDIATE MEDICAL CARE IF:  You have pain in your arms, neck, jaw, teeth, or back.   Your pain increases or changes in intensity or duration.   You develop nausea, vomiting, or sweating (diaphoresis).  You develop shortness of breath, or you faint.  Your vomit is green, yellow, black, or looks like coffee grounds or blood.  Your stool is red, bloody, or black.  These symptoms could be signs of other problems, such as heart disease, gastric bleeding, or esophageal bleeding.       PRINCIPAL DISCHARGE DIAGNOSIS  Diagnosis: Pulmonary embolism  Assessment and Plan of Treatment: Take your anticoagulation as directed.  Follow up with your health care provider within one week. Call for appointment.  If you develop shortness of breath or if your shortness of breath worsens call your Health Care Provider or go to the Emergency Department.        SECONDARY DISCHARGE DIAGNOSES  Diagnosis: Hypothyroidism  Assessment and Plan of Treatment: you do not make enough thyroid hormone  signs & symptoms of low levels of thyroid hormone - tired, getting cold easily, coarse or thin hair, constipation, shortness of breath, swelling, irregular periods  your doctor will do thyroid hormone blood tests at least once a year to monitor if medication dose is adequate  take your thyroid medicine as directed by your doctor & on empty stomach      Diagnosis: Chest discomfort  Assessment and Plan of Treatment: Chest discomfort is likely secondary to gastric acid reflux.   HOME CARE INSTRUCTIONS  Change the factors that you can control. Ask your caregiver for guidance concerning weight loss, quitting smoking, and alcohol consumption.  Avoid foods and drinks that make your symptoms worse, such as:   Caffeine or alcoholic drinks.   Chocolate.   Peppermint or mint flavorings.  Garlic and onions.  Spicy foods.   Citrus fruits, such as oranges, zacarias, or limes.   Tomato-based foods such as sauce, chili, salsa, and pizza.  Fried and fatty foods.  Avoid lying down for the 3 hours prior to your bedtime or prior to taking a nap.  Eat small, frequent meals instead of large meals.   Wear loose-fitting clothing. Do not wear anything tight around your waist that causes pressure on your stomach.  Raise the head of your bed 6 to 8 inches with wood blocks to help you sleep. Extra pillows will not help.  Only take over-the-counter or prescription medicines for pain, discomfort, or fever as directed by your caregiver.   Do not take aspirin, ibuprofen, or other nonsteroidal anti-inflammatory drugs (NSAIDs).  SEEK IMMEDIATE MEDICAL CARE IF:  You have pain in your arms, neck, jaw, teeth, or back.   Your pain increases or changes in intensity or duration.   You develop nausea, vomiting, or sweating (diaphoresis).  You develop shortness of breath, or you faint.  Your vomit is green, yellow, black, or looks like coffee grounds or blood.  Your stool is red, bloody, or black.  These symptoms could be signs of other problems, such as heart disease, gastric bleeding, or esophageal bleeding.

## 2020-11-05 NOTE — PROGRESS NOTE ADULT - SUBJECTIVE AND OBJECTIVE BOX
Patient is a 34y old  Female who presents with a chief complaint of chest pain and discomfort (2020 15:31)      SUBJECTIVE / OVERNIGHT EVENTS:  No new symptoms  SOB is same  Review of Systems:   CONSTITUTIONAL: No fever, weight loss, or fatigue  EYES: No eye pain, visual disturbances, or discharge  ENMT:  No difficulty hearing, tinnitus, vertigo; No sinus or throat pain  NECK: No pain or stiffness  BREASTS: No pain, masses, or nipple discharge  RESPIRATORY: No cough, wheezing, chills or hemoptysis; mild shortness of breath  CARDIOVASCULAR: No chest pain, palpitations, dizziness, or leg swelling  GASTROINTESTINAL: No abdominal or epigastric pain. No nausea, vomiting, or hematemesis; No diarrhea or constipation. No melena or hematochezia.  GENITOURINARY: No dysuria, frequency, hematuria, or incontinence  NEUROLOGICAL: No headaches, memory loss, loss of strength, numbness, or tremors  SKIN: No itching, burning, rashes, or lesions   LYMPH NODES: No enlarged glands  ENDOCRINE: No heat or cold intolerance; No hair loss  MUSCULOSKELETAL: No joint pain or swelling; No muscle, back, or extremity pain  PSYCHIATRIC: No depression, anxiety, mood swings, or difficulty sleeping  HEME/LYMPH: No easy bruising, or bleeding gums  ALLERY AND IMMUNOLOGIC: No hives or eczema    MEDICATIONS  (STANDING):  apixaban 5 milliGRAM(s) Oral every 12 hours  ascorbic acid 500 milliGRAM(s) Oral daily  ferrous    sulfate 325 milliGRAM(s) Oral daily  levothyroxine 112 MICROGram(s) Oral daily  pantoprazole    Tablet 40 milliGRAM(s) Oral before breakfast  sucralfate 1 Gram(s) Oral four times a day    MEDICATIONS  (PRN):      PHYSICAL EXAM:  Vital Signs Last 24 Hrs  T(C): 36.8 (2020 16:37), Max: 36.9 (2020 21:28)  T(F): 98.3 (2020 16:37), Max: 98.4 (2020 21:28)  HR: 78 (2020 16:37) (60 - 83)  BP: 130/74 (2020 16:37) (117/75 - 130/74)  BP(mean): --  RR: 18 (2020 16:37) (17 - 18)  SpO2: 99% (2020 16:37) (96% - 99%)  I&O's Summary    2020 07:01  -  2020 07:00  --------------------------------------------------------  IN: 1260 mL / OUT: 800 mL / NET: 460 mL      GENERAL: NAD, well-developed  HEAD:  Atraumatic, Normocephalic  EYES: EOMI, PERRLA, conjunctiva and sclera clear  NECK: Supple, No JVD  CHEST/LUNG: Clear to auscultation bilaterally; No wheeze  HEART: Regular rate and rhythm; No murmurs, rubs, or gallops  ABDOMEN: Soft, Nontender, Nondistended; Bowel sounds present  EXTREMITIES:  2+ Peripheral Pulses, No clubbing, cyanosis, or edema  PSYCH: AAOx3  NEUROLOGY: non-focal  SKIN: No rashes or lesions    LABS:  CAPILLARY BLOOD GLUCOSE                              12.7   3.23  )-----------( 229      ( 2020 07:26 )             41.5     11-05    141  |  108  |  12  ----------------------------<  87  4.1   |  21<L>  |  0.74    Ca    9.9      2020 07:21  Mg     2.0     11-03    TPro  7.3  /  Alb  4.7  /  TBili  0.3  /  DBili  x   /  AST  21  /  ALT  17  /  AlkPhos  81  11-03    PT/INR - ( 2020 22:35 )   PT: 15.7 sec;   INR: 1.33 ratio         PTT - ( 2020 22:35 )  PTT:35.0 sec      Urinalysis Basic - ( 2020 07:44 )    Color: Light Yellow / Appearance: Clear / S.028 / pH: x  Gluc: x / Ketone: Negative  / Bili: Negative / Urobili: Negative   Blood: x / Protein: Negative / Nitrite: Negative   Leuk Esterase: Large / RBC: 2 /hpf / WBC 20 /HPF   Sq Epi: x / Non Sq Epi: 2 /hpf / Bacteria: Few        RADIOLOGY & ADDITIONAL TESTS:    Imaging Personally Reviewed:    Consultant(s) Notes Reviewed:      Care Discussed with Consultants/Other Providers:

## 2020-11-05 NOTE — DISCHARGE NOTE PROVIDER - NSDCFUADDAPPT_GEN_ALL_CORE_FT
Please follow up with your PCP in a week for further management including evaluation of need for GI eval.

## 2020-11-05 NOTE — DISCHARGE NOTE PROVIDER - NSDCFUSCHEDAPPT_GEN_ALL_CORE_FT
JYOTSNA ACKERMAN ; 11/09/2020 ; NPP OB/GYN  [address] [facility]  JYOTSNA ACKERMAN ; 11/20/2020 ; NPP Med Pulm [address] [facility]

## 2020-11-05 NOTE — PROGRESS NOTE ADULT - PROBLEM SELECTOR PLAN 1
-initial concerns were for worsening PE/heart strain, CT angio done on admission shows no saddle or large PE, unable to evaluate segmental arteries  -echo results pending to eval for R heart strain  -as symptoms improved with famotidine, will continue for now  -likely heartburn related pain, and less likely due to worsening PE  -GI consult as out patient  -patient remains normoxic on RA, continue to monitor respiratory status

## 2020-11-06 ENCOUNTER — TRANSCRIPTION ENCOUNTER (OUTPATIENT)
Age: 35
End: 2020-11-06

## 2020-11-06 VITALS
SYSTOLIC BLOOD PRESSURE: 125 MMHG | RESPIRATION RATE: 16 BRPM | HEART RATE: 82 BPM | TEMPERATURE: 98 F | OXYGEN SATURATION: 98 % | DIASTOLIC BLOOD PRESSURE: 80 MMHG

## 2020-11-06 LAB
CULTURE RESULTS: SIGNIFICANT CHANGE UP
SPECIMEN SOURCE: SIGNIFICANT CHANGE UP

## 2020-11-06 PROCEDURE — 83880 ASSAY OF NATRIURETIC PEPTIDE: CPT

## 2020-11-06 PROCEDURE — 71275 CT ANGIOGRAPHY CHEST: CPT

## 2020-11-06 PROCEDURE — 84295 ASSAY OF SERUM SODIUM: CPT

## 2020-11-06 PROCEDURE — 86769 SARS-COV-2 COVID-19 ANTIBODY: CPT

## 2020-11-06 PROCEDURE — 84484 ASSAY OF TROPONIN QUANT: CPT

## 2020-11-06 PROCEDURE — 85025 COMPLETE CBC W/AUTO DIFF WBC: CPT

## 2020-11-06 PROCEDURE — 93005 ELECTROCARDIOGRAM TRACING: CPT

## 2020-11-06 PROCEDURE — 82947 ASSAY GLUCOSE BLOOD QUANT: CPT

## 2020-11-06 PROCEDURE — 81001 URINALYSIS AUTO W/SCOPE: CPT

## 2020-11-06 PROCEDURE — 93306 TTE W/DOPPLER COMPLETE: CPT

## 2020-11-06 PROCEDURE — 85018 HEMOGLOBIN: CPT

## 2020-11-06 PROCEDURE — 85730 THROMBOPLASTIN TIME PARTIAL: CPT

## 2020-11-06 PROCEDURE — 84132 ASSAY OF SERUM POTASSIUM: CPT

## 2020-11-06 PROCEDURE — 83735 ASSAY OF MAGNESIUM: CPT

## 2020-11-06 PROCEDURE — 82435 ASSAY OF BLOOD CHLORIDE: CPT

## 2020-11-06 PROCEDURE — 85027 COMPLETE CBC AUTOMATED: CPT

## 2020-11-06 PROCEDURE — 96374 THER/PROPH/DIAG INJ IV PUSH: CPT | Mod: XU

## 2020-11-06 PROCEDURE — 87086 URINE CULTURE/COLONY COUNT: CPT

## 2020-11-06 PROCEDURE — U0003: CPT

## 2020-11-06 PROCEDURE — 82803 BLOOD GASES ANY COMBINATION: CPT

## 2020-11-06 PROCEDURE — 71046 X-RAY EXAM CHEST 2 VIEWS: CPT

## 2020-11-06 PROCEDURE — 85610 PROTHROMBIN TIME: CPT

## 2020-11-06 PROCEDURE — 99285 EMERGENCY DEPT VISIT HI MDM: CPT | Mod: 25

## 2020-11-06 PROCEDURE — 80048 BASIC METABOLIC PNL TOTAL CA: CPT

## 2020-11-06 PROCEDURE — 85014 HEMATOCRIT: CPT

## 2020-11-06 PROCEDURE — 83690 ASSAY OF LIPASE: CPT

## 2020-11-06 PROCEDURE — 83605 ASSAY OF LACTIC ACID: CPT

## 2020-11-06 PROCEDURE — 82330 ASSAY OF CALCIUM: CPT

## 2020-11-06 PROCEDURE — 80053 COMPREHEN METABOLIC PANEL: CPT

## 2020-11-06 RX ADMIN — Medication 1 GRAM(S): at 13:26

## 2020-11-06 RX ADMIN — APIXABAN 5 MILLIGRAM(S): 2.5 TABLET, FILM COATED ORAL at 10:40

## 2020-11-06 RX ADMIN — Medication 112 MICROGRAM(S): at 06:22

## 2020-11-06 RX ADMIN — Medication 500 MILLIGRAM(S): at 13:25

## 2020-11-06 RX ADMIN — PANTOPRAZOLE SODIUM 40 MILLIGRAM(S): 20 TABLET, DELAYED RELEASE ORAL at 06:22

## 2020-11-06 RX ADMIN — Medication 325 MILLIGRAM(S): at 13:26

## 2020-11-06 NOTE — DISCHARGE NOTE NURSING/CASE MANAGEMENT/SOCIAL WORK - NSDCPNINST_GEN_ALL_CORE
Call MD or go to ER if severe shortness of breath, chest pain, nausea, vomiting, fever, signs and symptoms of infections.

## 2020-11-06 NOTE — DISCHARGE NOTE NURSING/CASE MANAGEMENT/SOCIAL WORK - PATIENT PORTAL LINK FT
You can access the FollowMyHealth Patient Portal offered by [facility] by registering at the following website: [email or web address]. By joining [employer]’[facility] FollowMyHealth portal, you will also be able to view your health information using other applications (apps) compatible with our system.

## 2020-11-08 ENCOUNTER — FORM ENCOUNTER (OUTPATIENT)
Age: 35
End: 2020-11-08

## 2020-11-09 ENCOUNTER — APPOINTMENT (OUTPATIENT)
Dept: OBGYN | Facility: CLINIC | Age: 35
End: 2020-11-09
Payer: COMMERCIAL

## 2020-11-09 ENCOUNTER — NON-APPOINTMENT (OUTPATIENT)
Age: 35
End: 2020-11-09

## 2020-11-09 PROCEDURE — 36415 COLL VENOUS BLD VENIPUNCTURE: CPT

## 2020-11-09 PROCEDURE — 0503F POSTPARTUM CARE VISIT: CPT

## 2020-11-18 NOTE — H&P ADULT - NECK DETAILS
.  Last office visit 7/16/2020     Last written 9- 60 with 1      Next office visit scheduled Visit date not found    Requested Prescriptions     Pending Prescriptions Disp Refills    pantoprazole (PROTONIX) 40 MG tablet [Pharmacy Med Name: PANTOPRAZOLE SOD 40 MG TABS] 60 tablet 1     Sig: TAKE 1 TABLET BY MOUTH 2 TIMES DAILY
supple

## 2020-11-20 ENCOUNTER — APPOINTMENT (OUTPATIENT)
Dept: PULMONOLOGY | Facility: CLINIC | Age: 35
End: 2020-11-20
Payer: COMMERCIAL

## 2020-11-20 VITALS
RESPIRATION RATE: 16 BRPM | SYSTOLIC BLOOD PRESSURE: 128 MMHG | WEIGHT: 152.25 LBS | TEMPERATURE: 97.6 F | DIASTOLIC BLOOD PRESSURE: 85 MMHG | OXYGEN SATURATION: 96 % | HEART RATE: 79 BPM

## 2020-11-20 PROCEDURE — 99215 OFFICE O/P EST HI 40 MIN: CPT

## 2020-11-20 RX ORDER — METHYLPREDNISOLONE 4 MG/1
4 TABLET ORAL
Qty: 1 | Refills: 0 | Status: DISCONTINUED | COMMUNITY
Start: 2018-04-09 | End: 2020-11-20

## 2020-11-20 NOTE — DISCUSSION/SUMMARY
[FreeTextEntry1] : ---Assessment plan----------The patient has been referred here for further opinion regarding pulmonary problem,\par \par 34-year-old female  in 2020 embolism on Eliquis, recent hospitalization 2020 for chest pain and SOB, referred to GI\par \par 1 - continue anti-coagulation--Eliquis--following up heme Dr. Pena ---  she will   f/u in 2021 \par 2 - follow up with Endocrinologist  ---for magement of her hypothyroidism [[   [h/o HYPOTHYROIDISM ON SYNTHROID  BIT THE LAST TSH WAS   < 0.01 ] \par 3 - follow up with GI\par 4 - follow up in 2021\par 5 - repeat Chest CT in 2021\par \par Thanks for allowing  me to participate  in the care of this patient.  Patient at this time  will follow  the above mentioned recommendations and return back for follow up visit. If you have any questions  I can be reached  at #[phone] (beeper)  or  [phone] (office).\par \par Argenis Smith MD, FCCP \par Director, Pulmonary Hypertension Program \par [facility] \par Division of Pulmonary, Critical Care and Sleep Medicine \par  Professor of Medicine \par [employer]\par

## 2020-11-20 NOTE — HISTORY OF PRESENT ILLNESS
[TextBox_4] : This letter  is regarding your patient  who  attended pulmonary out patient office today.  I have reviewed  patient's  past history, social history, family history and medication list. I also  reviewed nurse practitioners/ and fellows  notes and assessment and agree with it.  \par The patient was referred by ----s/p  2020--- developed PE--on Eliquis\par Very anxious,  \par ------No history of , fever, chills , rigors, chest pain, or hemoptysis. Questionable history of Raynaud's phenomenon. No h/o significant weight loss in last few months. No history of liver dysfunction , collagen vascular disorder or chronic thromboembolic disease. I would classify the patient's dyspnea as WHO  FUNCTIONAL CLASS II--------\par \par Eliquis was started on 2020--following diagnosis of PE and discharge from hospital.\par History of pulmonary embolism on Eliquis, PE developed after ----no past history of DVT no family history of thromboembolic phenomena---following Dr. Pena for heme.\par ----Echo  date- 2020 normal study-----\par ----PFT date-----2020----normal spirometry normal lung volumes decreased DLCO----\par ---overnight pulse oxymerty ----- no desaturation \par ----CT scan date---------bilateral PE\par CTA chest 11/3/2020 IMPRESSION:\par No large saddle embolus. There is poor opacification of the pulmonary arteries limiting evaluation for pulmonary embolus.\par Clear lungs.\par \par 2020 -- pt with PMH of PE on Eliquis with recent hospital admission for burning chest pain. ECHO and Chest CT clear. Pt discharged home to follow up with GI Dr. Jackson. Following heme Dr. Pena.---awaiting to f/u with her endocrinologist fro  thyrod disease[[[h/o HYPOTHYROIDISM ON SYNTHROID  BIT THE LAST TSH WAS   < 0.01 ]

## 2020-12-09 ENCOUNTER — TRANSCRIPTION ENCOUNTER (OUTPATIENT)
Age: 35
End: 2020-12-09

## 2020-12-22 ENCOUNTER — APPOINTMENT (OUTPATIENT)
Dept: INTERNAL MEDICINE | Facility: CLINIC | Age: 35
End: 2020-12-22

## 2020-12-30 ENCOUNTER — FORM ENCOUNTER (OUTPATIENT)
Age: 35
End: 2020-12-30

## 2020-12-31 ENCOUNTER — RESULT REVIEW (OUTPATIENT)
Age: 35
End: 2020-12-31

## 2020-12-31 ENCOUNTER — APPOINTMENT (OUTPATIENT)
Dept: OBGYN | Facility: CLINIC | Age: 35
End: 2020-12-31
Payer: COMMERCIAL

## 2020-12-31 PROCEDURE — 99072 ADDL SUPL MATRL&STAF TM PHE: CPT

## 2020-12-31 PROCEDURE — 99395 PREV VISIT EST AGE 18-39: CPT

## 2020-12-31 PROCEDURE — 36415 COLL VENOUS BLD VENIPUNCTURE: CPT

## 2021-01-07 ENCOUNTER — FORM ENCOUNTER (OUTPATIENT)
Age: 36
End: 2021-01-07

## 2021-03-10 ENCOUNTER — RX RENEWAL (OUTPATIENT)
Age: 36
End: 2021-03-10

## 2021-03-18 ENCOUNTER — APPOINTMENT (OUTPATIENT)
Dept: PULMONOLOGY | Facility: CLINIC | Age: 36
End: 2021-03-18
Payer: COMMERCIAL

## 2021-03-18 VITALS
DIASTOLIC BLOOD PRESSURE: 82 MMHG | BODY MASS INDEX: 33.17 KG/M2 | HEIGHT: 58 IN | SYSTOLIC BLOOD PRESSURE: 120 MMHG | HEART RATE: 78 BPM | TEMPERATURE: 98.5 F | WEIGHT: 158 LBS | RESPIRATION RATE: 16 BRPM

## 2021-03-18 LAB
ALBUMIN SERPL ELPH-MCNC: 4.6 G/DL
ALP BLD-CCNC: 65 U/L
ALT SERPL-CCNC: 14 U/L
ANION GAP SERPL CALC-SCNC: 8 MMOL/L
AST SERPL-CCNC: 14 U/L
BASOPHILS # BLD AUTO: 0.05 K/UL
BASOPHILS NFR BLD AUTO: 1 %
BILIRUB SERPL-MCNC: 0.2 MG/DL
BUN SERPL-MCNC: 15 MG/DL
CALCIUM SERPL-MCNC: 10 MG/DL
CHLORIDE SERPL-SCNC: 105 MMOL/L
CO2 SERPL-SCNC: 26 MMOL/L
CREAT SERPL-MCNC: 0.76 MG/DL
EOSINOPHIL # BLD AUTO: 0.22 K/UL
EOSINOPHIL NFR BLD AUTO: 4.3 %
FERRITIN SERPL-MCNC: 56 NG/ML
GLUCOSE SERPL-MCNC: 82 MG/DL
HCT VFR BLD CALC: 39.5 %
HGB BLD-MCNC: 13.4 G/DL
IMM GRANULOCYTES NFR BLD AUTO: 0.2 %
LYMPHOCYTES # BLD AUTO: 2.05 K/UL
LYMPHOCYTES NFR BLD AUTO: 40.1 %
MAN DIFF?: NORMAL
MCHC RBC-ENTMCNC: 31.3 PG
MCHC RBC-ENTMCNC: 33.9 GM/DL
MCV RBC AUTO: 92.3 FL
MONOCYTES # BLD AUTO: 0.36 K/UL
MONOCYTES NFR BLD AUTO: 7 %
NEUTROPHILS # BLD AUTO: 2.42 K/UL
NEUTROPHILS NFR BLD AUTO: 47.4 %
PLATELET # BLD AUTO: 288 K/UL
POTASSIUM SERPL-SCNC: 5.3 MMOL/L
PROT SERPL-MCNC: 7.3 G/DL
RBC # BLD: 4.28 M/UL
RBC # FLD: 13.4 %
SODIUM SERPL-SCNC: 139 MMOL/L
TSH SERPL-ACNC: 3.61 UIU/ML
WBC # FLD AUTO: 5.11 K/UL

## 2021-03-18 PROCEDURE — 99072 ADDL SUPL MATRL&STAF TM PHE: CPT

## 2021-03-18 PROCEDURE — 36415 COLL VENOUS BLD VENIPUNCTURE: CPT

## 2021-03-18 PROCEDURE — 99215 OFFICE O/P EST HI 40 MIN: CPT | Mod: 25

## 2021-03-18 NOTE — HISTORY OF PRESENT ILLNESS
[TextBox_4] : This letter  is regarding your patient  who  attended pulmonary out patient office today.  I have reviewed  patient's  past history, social history, family history and medication list. I also  reviewed nurse practitioners/ and fellows  notes and assessment and agree with it.  \par The patient was referred by ----s/p  2020--- developed PE--on Eliquis\par Very anxious,  \par ------No history of , fever, chills , rigors, chest pain, or hemoptysis. Questionable history of Raynaud's phenomenon. No h/o significant weight loss in last few months. No history of liver dysfunction , collagen vascular disorder or chronic thromboembolic disease. I would classify the patient's dyspnea as WHO  FUNCTIONAL CLASS II--------\par \par Eliquis was started on 2020--following diagnosis of PE and discharge from hospital.\par History of pulmonary embolism on Eliquis, PE developed after ----no past history of DVT no family history of thromboembolic phenomena---following Dr. Pena for heme.\par ----Echo  date- 2020 normal study-----\par ----PFT date-----2020----normal spirometry normal lung volumes decreased DLCO----\par ---overnight pulse oxymerty ----- no desaturation \par ----CT scan date---------bilateral PE\par CTA chest 11/3/2020 IMPRESSION:\par No large saddle embolus. There is poor opacification of the pulmonary arteries limiting evaluation for pulmonary embolus.\par Clear lungs.\par \par 2020 -- pt with PMH of PE on Eliquis with recent hospital admission for burning chest pain. ECHO and Chest CT clear. Pt discharged home to follow up with GI Dr. Jackson. Following heme Dr. Pena.---awaiting to f/u with her endocrinologist fro  thyrod disease[[[h/o HYPOTHYROIDISM ON SYNTHROID  BIT THE LAST TSH WAS   < 0.01 ]\par \par 3/2021 on eliquis since 2020 for pE - doing well, no resp complaints. Received pfizer vac x 2  --- will repeat CTA prior to discontinuing the Eliquis

## 2021-04-06 ENCOUNTER — RX CHANGE (OUTPATIENT)
Age: 36
End: 2021-04-06

## 2021-04-06 RX ORDER — APIXABAN 5 MG/1
5 TABLET, FILM COATED ORAL
Qty: 60 | Refills: 1 | Status: DISCONTINUED | COMMUNITY
Start: 2020-10-06 | End: 2021-04-06

## 2021-04-11 NOTE — PRE-ANESTHESIA EVALUATION ADULT - HEIGHT IN FEET
Patient brought in by Lara HUTCHINSON. Patient had an argument with her ex-fiance and got frustrated and stated that she wanted to kill herself. Patient denies wanting to kill herself and stated that she said it out of anger. States that she just wants to go home and shower.   5

## 2021-04-13 ENCOUNTER — OUTPATIENT (OUTPATIENT)
Dept: OUTPATIENT SERVICES | Facility: HOSPITAL | Age: 36
LOS: 1 days | End: 2021-04-13
Payer: COMMERCIAL

## 2021-04-13 ENCOUNTER — APPOINTMENT (OUTPATIENT)
Dept: CT IMAGING | Facility: CLINIC | Age: 36
End: 2021-04-13
Payer: COMMERCIAL

## 2021-04-13 DIAGNOSIS — Z98.891 HISTORY OF UTERINE SCAR FROM PREVIOUS SURGERY: Chronic | ICD-10-CM

## 2021-04-13 DIAGNOSIS — Z00.8 ENCOUNTER FOR OTHER GENERAL EXAMINATION: ICD-10-CM

## 2021-04-13 PROCEDURE — 71275 CT ANGIOGRAPHY CHEST: CPT | Mod: 26

## 2021-04-13 PROCEDURE — 71275 CT ANGIOGRAPHY CHEST: CPT

## 2021-04-14 ENCOUNTER — NON-APPOINTMENT (OUTPATIENT)
Age: 36
End: 2021-04-14

## 2021-04-14 RX ORDER — APIXABAN 5 MG/1
5 TABLET, FILM COATED ORAL
Qty: 60 | Refills: 1 | Status: DISCONTINUED | COMMUNITY
Start: 2021-04-06 | End: 2021-04-14

## 2021-04-26 ENCOUNTER — APPOINTMENT (OUTPATIENT)
Dept: PAIN MANAGEMENT | Facility: CLINIC | Age: 36
End: 2021-04-26
Payer: COMMERCIAL

## 2021-04-26 VITALS
HEIGHT: 58 IN | DIASTOLIC BLOOD PRESSURE: 80 MMHG | SYSTOLIC BLOOD PRESSURE: 124 MMHG | HEART RATE: 73 BPM | BODY MASS INDEX: 32.54 KG/M2 | WEIGHT: 155 LBS

## 2021-04-26 DIAGNOSIS — R51.9 HEADACHE, UNSPECIFIED: ICD-10-CM

## 2021-04-26 PROCEDURE — 99214 OFFICE O/P EST MOD 30 MIN: CPT

## 2021-04-26 PROCEDURE — 99072 ADDL SUPL MATRL&STAF TM PHE: CPT

## 2021-04-26 NOTE — HISTORY OF PRESENT ILLNESS
[FreeTextEntry1] : Pt notes that she has been doing relatively well.\par She had been on blood thinners until 2 weeks ago.  She had been having no headaches but then had two episodes since the discontinuation of the eliquis.\par Had been off of the blood thinner and now concerned about longer term use of meds and what she is going to be able to use.\par Has started on the migrelief and is planning to continue with this and taking other vitamins.\par Notes dizziness with triptan "migraine hangover."\par Does note ASA and PCN allergy.

## 2021-05-24 ENCOUNTER — FORM ENCOUNTER (OUTPATIENT)
Age: 36
End: 2021-05-24

## 2021-05-28 ENCOUNTER — TRANSCRIPTION ENCOUNTER (OUTPATIENT)
Age: 36
End: 2021-05-28

## 2021-05-29 ENCOUNTER — NON-APPOINTMENT (OUTPATIENT)
Age: 36
End: 2021-05-29

## 2021-05-29 ENCOUNTER — EMERGENCY (EMERGENCY)
Facility: HOSPITAL | Age: 36
LOS: 1 days | Discharge: HOME | End: 2021-05-29
Attending: EMERGENCY MEDICINE
Payer: COMMERCIAL

## 2021-05-29 VITALS
WEIGHT: 149.91 LBS | OXYGEN SATURATION: 99 % | HEART RATE: 86 BPM | RESPIRATION RATE: 18 BRPM | SYSTOLIC BLOOD PRESSURE: 136 MMHG | HEIGHT: 63 IN | TEMPERATURE: 98 F | DIASTOLIC BLOOD PRESSURE: 91 MMHG

## 2021-05-29 DIAGNOSIS — Z98.891 HISTORY OF UTERINE SCAR FROM PREVIOUS SURGERY: Chronic | ICD-10-CM

## 2021-05-29 LAB
ALBUMIN SERPL ELPH-MCNC: 4.8 G/DL — SIGNIFICANT CHANGE UP (ref 3.3–5)
ALP SERPL-CCNC: 60 U/L — SIGNIFICANT CHANGE UP (ref 40–120)
ALT FLD-CCNC: 12 U/L — SIGNIFICANT CHANGE UP (ref 10–45)
ANION GAP SERPL CALC-SCNC: 15 MMOL/L — SIGNIFICANT CHANGE UP (ref 5–17)
APTT BLD: 28.1 SEC — SIGNIFICANT CHANGE UP (ref 27.5–35.5)
AST SERPL-CCNC: 16 U/L — SIGNIFICANT CHANGE UP (ref 10–40)
BASOPHILS # BLD AUTO: 0.03 K/UL — SIGNIFICANT CHANGE UP (ref 0–0.2)
BASOPHILS NFR BLD AUTO: 0.6 % — SIGNIFICANT CHANGE UP (ref 0–2)
BILIRUB SERPL-MCNC: 0.3 MG/DL — SIGNIFICANT CHANGE UP (ref 0.2–1.2)
BUN SERPL-MCNC: 11 MG/DL — SIGNIFICANT CHANGE UP (ref 7–23)
CALCIUM SERPL-MCNC: 10.1 MG/DL — SIGNIFICANT CHANGE UP (ref 8.4–10.5)
CHLORIDE SERPL-SCNC: 103 MMOL/L — SIGNIFICANT CHANGE UP (ref 96–108)
CO2 SERPL-SCNC: 21 MMOL/L — LOW (ref 22–31)
CREAT SERPL-MCNC: 0.88 MG/DL — SIGNIFICANT CHANGE UP (ref 0.5–1.3)
EOSINOPHIL # BLD AUTO: 0.1 K/UL — SIGNIFICANT CHANGE UP (ref 0–0.5)
EOSINOPHIL NFR BLD AUTO: 2 % — SIGNIFICANT CHANGE UP (ref 0–6)
GLUCOSE SERPL-MCNC: 81 MG/DL — SIGNIFICANT CHANGE UP (ref 70–99)
HCG SERPL-ACNC: <2 MIU/ML — SIGNIFICANT CHANGE UP
HCT VFR BLD CALC: 41.5 % — SIGNIFICANT CHANGE UP (ref 34.5–45)
HGB BLD-MCNC: 13.8 G/DL — SIGNIFICANT CHANGE UP (ref 11.5–15.5)
IMM GRANULOCYTES NFR BLD AUTO: 0.2 % — SIGNIFICANT CHANGE UP (ref 0–1.5)
INR BLD: 1.04 RATIO — SIGNIFICANT CHANGE UP (ref 0.88–1.16)
LYMPHOCYTES # BLD AUTO: 1.85 K/UL — SIGNIFICANT CHANGE UP (ref 1–3.3)
LYMPHOCYTES # BLD AUTO: 36.1 % — SIGNIFICANT CHANGE UP (ref 13–44)
MCHC RBC-ENTMCNC: 31.4 PG — SIGNIFICANT CHANGE UP (ref 27–34)
MCHC RBC-ENTMCNC: 33.3 GM/DL — SIGNIFICANT CHANGE UP (ref 32–36)
MCV RBC AUTO: 94.5 FL — SIGNIFICANT CHANGE UP (ref 80–100)
MONOCYTES # BLD AUTO: 0.44 K/UL — SIGNIFICANT CHANGE UP (ref 0–0.9)
MONOCYTES NFR BLD AUTO: 8.6 % — SIGNIFICANT CHANGE UP (ref 2–14)
NEUTROPHILS # BLD AUTO: 2.69 K/UL — SIGNIFICANT CHANGE UP (ref 1.8–7.4)
NEUTROPHILS NFR BLD AUTO: 52.5 % — SIGNIFICANT CHANGE UP (ref 43–77)
NRBC # BLD: 0 /100 WBCS — SIGNIFICANT CHANGE UP (ref 0–0)
NT-PROBNP SERPL-SCNC: 22 PG/ML — SIGNIFICANT CHANGE UP (ref 0–300)
PLATELET # BLD AUTO: 237 K/UL — SIGNIFICANT CHANGE UP (ref 150–400)
POTASSIUM SERPL-MCNC: 4.4 MMOL/L — SIGNIFICANT CHANGE UP (ref 3.5–5.3)
POTASSIUM SERPL-SCNC: 4.4 MMOL/L — SIGNIFICANT CHANGE UP (ref 3.5–5.3)
PROT SERPL-MCNC: 7.7 G/DL — SIGNIFICANT CHANGE UP (ref 6–8.3)
PROTHROM AB SERPL-ACNC: 12.5 SEC — SIGNIFICANT CHANGE UP (ref 10.6–13.6)
RBC # BLD: 4.39 M/UL — SIGNIFICANT CHANGE UP (ref 3.8–5.2)
RBC # FLD: 11.9 % — SIGNIFICANT CHANGE UP (ref 10.3–14.5)
SODIUM SERPL-SCNC: 139 MMOL/L — SIGNIFICANT CHANGE UP (ref 135–145)
TROPONIN T, HIGH SENSITIVITY RESULT: <6 NG/L — SIGNIFICANT CHANGE UP (ref 0–51)
WBC # BLD: 5.12 K/UL — SIGNIFICANT CHANGE UP (ref 3.8–10.5)
WBC # FLD AUTO: 5.12 K/UL — SIGNIFICANT CHANGE UP (ref 3.8–10.5)

## 2021-05-29 PROCEDURE — 80053 COMPREHEN METABOLIC PANEL: CPT

## 2021-05-29 PROCEDURE — 84484 ASSAY OF TROPONIN QUANT: CPT

## 2021-05-29 PROCEDURE — 83880 ASSAY OF NATRIURETIC PEPTIDE: CPT

## 2021-05-29 PROCEDURE — 71275 CT ANGIOGRAPHY CHEST: CPT | Mod: 26,MA

## 2021-05-29 PROCEDURE — 85025 COMPLETE CBC W/AUTO DIFF WBC: CPT

## 2021-05-29 PROCEDURE — 99284 EMERGENCY DEPT VISIT MOD MDM: CPT | Mod: 25

## 2021-05-29 PROCEDURE — 93005 ELECTROCARDIOGRAM TRACING: CPT

## 2021-05-29 PROCEDURE — 85730 THROMBOPLASTIN TIME PARTIAL: CPT

## 2021-05-29 PROCEDURE — 71275 CT ANGIOGRAPHY CHEST: CPT

## 2021-05-29 PROCEDURE — 99285 EMERGENCY DEPT VISIT HI MDM: CPT

## 2021-05-29 PROCEDURE — 85610 PROTHROMBIN TIME: CPT

## 2021-05-29 PROCEDURE — 84702 CHORIONIC GONADOTROPIN TEST: CPT

## 2021-05-29 RX ORDER — ACETAMINOPHEN 500 MG
650 TABLET ORAL ONCE
Refills: 0 | Status: COMPLETED | OUTPATIENT
Start: 2021-05-29 | End: 2021-05-29

## 2021-05-29 NOTE — ED PROVIDER NOTE - CLINICAL SUMMARY MEDICAL DECISION MAKING FREE TEXT BOX
SOB and pleuritic chest pain x 3 wks in 35F with history of PE recently taken off AC. Pt well appearing, HD stable, non-tachy/hypoxic. Will assess for PE, PNA, PTX, myocarditis with labs/CT then determine need for further evaluation. SOB and pleuritic chest pain x 3 wks in 35F with history of PE recently taken off AC. Pt well appearing, HD stable, non-tachy/hypoxic. Will assess for PE, PNA, PTX, myocarditis with labs/CT then determine need for further evaluation.    MIYA Wood MD: Pt is a 36 y/o female with PMH hypothyroidism and PE (postpartum after most recent pregnancy, now off AC x 1 mo) p/w c/o SOB and mild pleuritic CP x 3 wks. Denies leg pain/swelling/travel/trauma/immobilization. Ddx includes, however, is not limited to: PE, pleurisy, uri, pna, pericarditis, other. Plan: basic labs, trop, 12-lead, CTA chest, reassess

## 2021-05-29 NOTE — ED ADULT NURSE REASSESSMENT NOTE - NS ED NURSE REASSESS COMMENT FT1
Handoff report received from LEIF Burk. Pt resting comfortably in gold 7. Pt A&O x 4, VSS. Bed in lowest position, side rails up and call bell in reach. Pending CT results.

## 2021-05-29 NOTE — ED PROVIDER NOTE - OBJECTIVE STATEMENT
36 y/o  female with PMH including hypothyroidism and recent PE presents with shortness of breath and mild pleuritic chest pain x 3 weeks. Denies any other symptoms including fever, cough, abd pain, leg swelling. Of note, pt was diagnosed with a PE when she was several weeks post-partum from a  in 2020. Was on Eliquis until one month ago when repeat CT was negative for PE. States current symptoms started shortly after going off Eliquis.

## 2021-05-29 NOTE — ED PROVIDER NOTE - NS ED ROS FT
ROS:  GENERAL: No fever, no chills  EYES: no change in vision  HEENT: no trouble swallowing, no trouble speaking  CARDIAC: +pleuritic chest pain  PULMONARY: +SOB. no cough   GI: no abdominal pain, no nausea, no vomiting, no diarrhea, no constipation  : No dysuria, no frequency, no change in appearance, or odor of urine  SKIN: no rashes  NEURO: no headache, no weakness  MSK: No joint pain    Adan Myers PGY3

## 2021-05-29 NOTE — ED PROVIDER NOTE - PATIENT PORTAL LINK FT
You can access the FollowMyHealth Patient Portal offered by [facility] by registering at the following website: [email or web address]. By joining [employer]’s FollowMyHealth portal, you will also be able to view your health information using other applications (apps) compatible with our system.

## 2021-05-29 NOTE — ED PROVIDER NOTE - PHYSICAL EXAMINATION
Gen: AAOx3, non-toxic  Head: NCAT  HEENT: EOMI, oral mucosa moist, normal conjunctiva  Lung: CTAB, no respiratory distress, no wheezes/rhonchi/rales B/L, speaking in full sentences  CV: RRR, no murmurs, rubs or gallops  Abd: soft, NTND  MSK: no visible deformities  Neuro: No focal sensory or motor deficits  Skin: Warm, well perfused, no rash, no LE edema or calf TTP   Psych: normal affect.     Adan Myers PGY3

## 2021-05-29 NOTE — ED ADULT TRIAGE NOTE - CHIEF COMPLAINT QUOTE
difficulty breathing and taking deep breaths, worse on exertion, hx of PE and was taken off eliquis 1 month ago

## 2021-05-29 NOTE — ED ADULT NURSE NOTE - NSIMPLEMENTINTERV_GEN_ALL_ED
Implemented All Universal Safety Interventions:  [place] to call system. Call bell, personal items and telephone within reach. Instruct patient to call for assistance. Room bathroom lighting operational. Non-slip footwear when patient is off stretcher. Physically safe environment: no spills, clutter or unnecessary equipment. Stretcher in lowest position, wheels locked, appropriate side rails in place.

## 2021-05-29 NOTE — ED ADULT NURSE NOTE - OBJECTIVE STATEMENT
34 y/o Female presenting to the ED by EMS, A&Ox3. complaining of shortness of breath and chest "cramping" for the past 2-3 weeks. Pt hx of PE after giving birth last august, was placed on Eliquis and taken off it 1 month ago. Pt reports the symptoms of SOB started after stopping the Eliquis. Pt reports pain worsens with inspiration. Pt had a migraine and panic attack yesterday. Pt was seen by PCP and was told it was anxiety. Pt denies fever, chills, N/V/D, back pain, cough, sick contacts, dysuria, hematuria. Pt appears well and in no current distress. No accessory muscle use noted. Pt able to speak in full clear sentences. Cardiac monitor in place showing NSR. Safety and comfort measures provided, bed locked and in lowest position, side rails up for safety. Call bell within reach. Awaiting results.

## 2021-05-30 VITALS
SYSTOLIC BLOOD PRESSURE: 129 MMHG | OXYGEN SATURATION: 99 % | DIASTOLIC BLOOD PRESSURE: 92 MMHG | HEART RATE: 69 BPM | RESPIRATION RATE: 16 BRPM | TEMPERATURE: 98 F

## 2021-06-01 ENCOUNTER — TRANSCRIPTION ENCOUNTER (OUTPATIENT)
Age: 36
End: 2021-06-01

## 2021-06-08 NOTE — DISCHARGE NOTE OB - REDNESS, SWELLING, YELLOW-GREEN OR BLOODY DISCHARGE FROM YOUR INCISION
1.  Called and talked with Hamida from [facility].     - she reported Valentina is calling now to set up INR appt.     - checked status:  Valentina scheduled INR appt for 6/8/20 @ [facility].            2.  Called and left detailed message with Valentina Olmedo.     - need to check INR today to check INR status for appropriate warfarin dosing instructions.     - last INR was on 5/18/20 and was subtherapeutic at 1.40.  Was suppose to call and schedule INR one wk after new increased warfarin dosing.             Statement Selected

## 2021-07-13 ENCOUNTER — TRANSCRIPTION ENCOUNTER (OUTPATIENT)
Age: 36
End: 2021-07-13

## 2021-07-14 ENCOUNTER — TRANSCRIPTION ENCOUNTER (OUTPATIENT)
Age: 36
End: 2021-07-14

## 2021-07-15 ENCOUNTER — TRANSCRIPTION ENCOUNTER (OUTPATIENT)
Age: 36
End: 2021-07-15

## 2021-08-20 ENCOUNTER — FORM ENCOUNTER (OUTPATIENT)
Age: 36
End: 2021-08-20

## 2021-09-10 ENCOUNTER — APPOINTMENT (OUTPATIENT)
Dept: DISASTER EMERGENCY | Facility: CLINIC | Age: 36
End: 2021-09-10

## 2021-09-11 LAB — SARS-COV-2 N GENE NPH QL NAA+PROBE: NOT DETECTED

## 2021-09-14 ENCOUNTER — APPOINTMENT (OUTPATIENT)
Dept: PULMONOLOGY | Facility: CLINIC | Age: 36
End: 2021-09-14
Payer: COMMERCIAL

## 2021-09-14 ENCOUNTER — MED ADMIN CHARGE (OUTPATIENT)
Age: 36
End: 2021-09-14

## 2021-09-14 VITALS
HEIGHT: 59.5 IN | OXYGEN SATURATION: 98 % | HEART RATE: 78 BPM | WEIGHT: 150 LBS | BODY MASS INDEX: 29.84 KG/M2 | TEMPERATURE: 97.9 F

## 2021-09-14 VITALS
SYSTOLIC BLOOD PRESSURE: 121 MMHG | HEART RATE: 68 BPM | DIASTOLIC BLOOD PRESSURE: 84 MMHG | TEMPERATURE: 97.5 F | BODY MASS INDEX: 30.04 KG/M2 | OXYGEN SATURATION: 99 % | WEIGHT: 151 LBS | HEIGHT: 59.5 IN

## 2021-09-14 DIAGNOSIS — Z23 ENCOUNTER FOR IMMUNIZATION: ICD-10-CM

## 2021-09-14 DIAGNOSIS — G43.101 MIGRAINE WITH AURA, NOT INTRACTABLE, WITH STATUS MIGRAINOSUS: ICD-10-CM

## 2021-09-14 DIAGNOSIS — I26.99 OTHER PULMONARY EMBOLISM W/OUT ACUTE COR PULMONALE: ICD-10-CM

## 2021-09-14 PROCEDURE — ZZZZZ: CPT

## 2021-09-14 PROCEDURE — 90686 IIV4 VACC NO PRSV 0.5 ML IM: CPT

## 2021-09-14 PROCEDURE — 94729 DIFFUSING CAPACITY: CPT

## 2021-09-14 PROCEDURE — 94010 BREATHING CAPACITY TEST: CPT

## 2021-09-14 PROCEDURE — 99215 OFFICE O/P EST HI 40 MIN: CPT | Mod: 25

## 2021-09-14 PROCEDURE — 94726 PLETHYSMOGRAPHY LUNG VOLUMES: CPT

## 2021-09-14 PROCEDURE — G0008: CPT

## 2021-09-14 RX ORDER — INDOMETHACIN 25 MG/1
25 CAPSULE ORAL
Qty: 20 | Refills: 3 | Status: DISCONTINUED | COMMUNITY
Start: 2020-01-17 | End: 2021-09-14

## 2021-09-14 RX ORDER — RIMEGEPANT SULFATE 75 MG/75MG
75 TABLET, ORALLY DISINTEGRATING ORAL
Refills: 0 | Status: ACTIVE | COMMUNITY
Start: 2021-09-14

## 2021-09-14 RX ORDER — METOCLOPRAMIDE 10 MG/1
10 TABLET ORAL
Qty: 15 | Refills: 3 | Status: DISCONTINUED | COMMUNITY
Start: 2020-01-17 | End: 2021-09-14

## 2021-09-14 RX ORDER — ERENUMAB-AOOE 70 MG/ML
70 INJECTION SUBCUTANEOUS
Qty: 1 | Refills: 5 | Status: DISCONTINUED | COMMUNITY
Start: 2018-06-11 | End: 2021-09-14

## 2021-09-14 NOTE — DISCUSSION/SUMMARY
[FreeTextEntry1] : ---Assessment plan----------The patient has been referred here for further opinion regarding pulmonary problem,\par \par 35-year-old female  in 2020 embolism on Eliquis, recent hospitalization 2020 for chest pain and SOB, referred to GI----feels fine, now off anticoagulation\par \par 1 -up to date w/covid vac- influenza vac given today\par 2- anxiety- fu with psych\par 3- will send results form heme f/u\par 4- follow up with Endocrinologist  ---for management of her hypothyroidism] \par 5- yearly f/u visit w/CT \par 6- needs PFT\par \par Thanks for allowing  me to participate  in the care of this patient.  Patient at this time  will follow  the above mentioned recommendations and return back for follow up visit. If you have any questions  I can be reached  at #[phone] (beeper)  or  [phone] (office).\par \par \par \par Argenis Smith MD, FCCP \par Director, Pulmonary Hypertension Program \par [facility] \par Division of Pulmonary, Critical Care and Sleep Medicine \par  Professor of Medicine \par [employer]\par \par \par ERIN Pablo-C\par

## 2021-09-14 NOTE — HISTORY OF PRESENT ILLNESS
[TextBox_4] : This letter  is regarding your patient  who  attended pulmonary out patient office today.  I have reviewed  patient's  past history, social history, family history and medication list. I also  reviewed nurse practitioners/ and fellows  notes and assessment and agree with it.  \par The patient was referred by ----s/p  2020--- developed PE--on Eliquis\par Very anxious,  \par ------No history of , fever, chills , rigors, chest pain, or hemoptysis. Questionable history of Raynaud's phenomenon. No h/o significant weight loss in last few months. No history of liver dysfunction , collagen vascular disorder or chronic thromboembolic disease. I would classify the patient's dyspnea as WHO  FUNCTIONAL CLASS II--------\par \par Eliquis was started on 2020--following diagnosis of PE and discharge from hospital.\par History of pulmonary embolism on Eliquis, PE developed after ----no past history of DVT no family history of thromboembolic phenomena---following Dr. Pena for heme.\par ----Echo  date- 2020 normal study-----\par ----PFT date-----2020----normal spirometry normal lung volumes decreased DLCO----\par ---overnight pulse oxymerty ----- no desaturation \par ----CT scan date---------bilateral PE\par CTA chest 11/3/2020 IMPRESSION:\par No large saddle embolus. There is poor opacification of the pulmonary arteries limiting evaluation for pulmonary embolus.\par Clear lungs.\par \par 2020 -- pt with PMH of PE on Eliquis with recent hospital admission for burning chest pain. ECHO and Chest CT clear. Pt discharged home to follow up with GI Dr. Jackson. Following heme Dr. Pena.---awaiting to f/u with her endocrinologist fro  thyrod disease[[[h/o HYPOTHYROIDISM ON SYNTHROID  BIT THE LAST TSH WAS   < 0.01 ]\par \par 3/2021 on eliquis since 2020 for pE - doing well, no resp complaints. Received pfizer vac x 2  --- will repeat CTA prior to discontinuing the Eliquis\par \par \par 2021 off eliquis x 6 m - doing well from resp perspective . c/o anxiety- was following psych but stopped d/t childcare issues-plans to resume. On nurtec for headaches\par She is up to date w/covid vac

## 2021-09-17 ENCOUNTER — RX RENEWAL (OUTPATIENT)
Age: 36
End: 2021-09-17

## 2021-10-19 LAB — CYTOLOGY CVX/VAG DOC THIN PREP: NORMAL

## 2021-10-19 RX ORDER — IRON/IRON ASP GLY/FA/MV-MIN 38 125-25-1MG
TABLET ORAL
Refills: 0 | Status: ACTIVE | COMMUNITY

## 2021-10-19 RX ORDER — MULTIVITAMIN
TABLET ORAL
Refills: 0 | Status: ACTIVE | COMMUNITY

## 2021-10-19 RX ORDER — DOCUSATE SODIUM 100 MG/1
CAPSULE ORAL
Refills: 0 | Status: ACTIVE | COMMUNITY

## 2021-10-19 RX ORDER — PNV/FERROUS SULFATE/FOLIC ACID 27-<0.5MG
TABLET ORAL
Refills: 0 | Status: ACTIVE | COMMUNITY

## 2021-10-19 RX ORDER — ASCORBIC ACID 500 MG
TABLET ORAL
Refills: 0 | Status: ACTIVE | COMMUNITY

## 2021-11-11 ENCOUNTER — APPOINTMENT (OUTPATIENT)
Dept: PAIN MANAGEMENT | Facility: CLINIC | Age: 36
End: 2021-11-11
Payer: COMMERCIAL

## 2021-11-11 VITALS
BODY MASS INDEX: 29.44 KG/M2 | SYSTOLIC BLOOD PRESSURE: 131 MMHG | DIASTOLIC BLOOD PRESSURE: 79 MMHG | WEIGHT: 148 LBS | HEIGHT: 59.5 IN | HEART RATE: 71 BPM

## 2021-11-11 PROCEDURE — 99212 OFFICE O/P EST SF 10 MIN: CPT

## 2021-11-11 NOTE — HISTORY OF PRESENT ILLNESS
[FreeTextEntry1] : Pt returns today for a follow up visit. Has been taking Nurtec QOD and tolerates it well .\par Has had 3 migraines since August 2021. Will take Tylenol prn to abort migraine .\par Health has been good . \par Pt is no longer taking Eliquis.

## 2021-11-11 NOTE — ASSESSMENT
[FreeTextEntry1] : Continue  nurtec preventively\par RTO in Spring for followup appt with Dr Cole , call sooner if needed.

## 2021-12-20 ENCOUNTER — APPOINTMENT (OUTPATIENT)
Dept: INTERNAL MEDICINE | Facility: CLINIC | Age: 36
End: 2021-12-20
Payer: COMMERCIAL

## 2021-12-20 PROCEDURE — 99214 OFFICE O/P EST MOD 30 MIN: CPT | Mod: 95

## 2021-12-22 ENCOUNTER — TRANSCRIPTION ENCOUNTER (OUTPATIENT)
Age: 36
End: 2021-12-22

## 2021-12-23 ENCOUNTER — TRANSCRIPTION ENCOUNTER (OUTPATIENT)
Age: 36
End: 2021-12-23

## 2021-12-23 DIAGNOSIS — R09.81 NASAL CONGESTION: ICD-10-CM

## 2021-12-24 ENCOUNTER — APPOINTMENT (OUTPATIENT)
Dept: INTERNAL MEDICINE | Facility: CLINIC | Age: 36
End: 2021-12-24

## 2021-12-24 RX ORDER — DEXAMETHASONE 4 MG/1
4 TABLET ORAL
Qty: 6 | Refills: 0 | Status: ACTIVE | COMMUNITY
Start: 2017-07-21 | End: 1900-01-01

## 2021-12-28 ENCOUNTER — TRANSCRIPTION ENCOUNTER (OUTPATIENT)
Age: 36
End: 2021-12-28

## 2022-01-04 ENCOUNTER — APPOINTMENT (OUTPATIENT)
Dept: PAIN MANAGEMENT | Facility: CLINIC | Age: 37
End: 2022-01-04

## 2022-01-04 DIAGNOSIS — Z98.890 OTHER SPECIFIED POSTPROCEDURAL STATES: ICD-10-CM

## 2022-01-13 ENCOUNTER — APPOINTMENT (OUTPATIENT)
Dept: OBGYN | Facility: CLINIC | Age: 37
End: 2022-01-13
Payer: COMMERCIAL

## 2022-01-13 VITALS
SYSTOLIC BLOOD PRESSURE: 120 MMHG | HEIGHT: 59 IN | BODY MASS INDEX: 29.84 KG/M2 | DIASTOLIC BLOOD PRESSURE: 78 MMHG | WEIGHT: 148 LBS

## 2022-01-13 DIAGNOSIS — Z11.51 ENCOUNTER FOR SCREENING FOR HUMAN PAPILLOMAVIRUS (HPV): ICD-10-CM

## 2022-01-13 PROCEDURE — 99395 PREV VISIT EST AGE 18-39: CPT

## 2022-01-13 NOTE — PHYSICAL EXAM
[Appropriately responsive] : appropriately responsive [Alert] : alert [No Acute Distress] : no acute distress [No Lymphadenopathy] : no lymphadenopathy [Soft] : soft [Non-tender] : non-tender [Non-distended] : non-distended [No HSM] : No HSM [No Lesions] : no lesions [No Mass] : no mass [Oriented x3] : oriented x3 [Examination Of The Breasts] : a normal appearance [No Masses] : no breast masses were palpable [Labia Majora] : normal [Labia Minora] : normal [Normal] : normal [Uterine Adnexae] : normal [Chaperone Present] : A chaperone was present in the examining room during all aspects of the physical examination [FreeTextEntry1] : [email or web address]

## 2022-01-13 NOTE — PLAN
[FreeTextEntry1] : 36 year old female presents for annual wellness exam. Stable\par \par -Pap smear done\par -Rx mammogram due at age 40\par -Discussed Mirena IUD, brochure provided \par -RTO next annual

## 2022-01-13 NOTE — HISTORY OF PRESENT ILLNESS
[FreeTextEntry1] : 36 year old female  presents for annual wellness exam. She is doing well and has no complaints. FHx: Father Sister has breast cancer. Mother BRCA-negative. LMP 21 \par

## 2022-01-14 ENCOUNTER — TRANSCRIPTION ENCOUNTER (OUTPATIENT)
Age: 37
End: 2022-01-14

## 2022-01-16 LAB — HPV HIGH+LOW RISK DNA PNL CVX: NOT DETECTED

## 2022-01-18 LAB — CYTOLOGY CVX/VAG DOC THIN PREP: NORMAL

## 2022-01-25 NOTE — DISCHARGE NOTE PROVIDER - NSDCADMDATE_GEN_ALL_CORE_FT
Patient Education   Take antibiotic as directed. Be sure to complete course of medication. Take with food to prevent stomach upset  Use debrox earwax removal kit to right ear  Flonase nasal spray to help reduce swelling in nasal mucosa  Nasal saline spray to help thin secretions  Acute Sinusitis    Acute sinusitis is irritation and swelling of the sinuses. It is usually caused by a viral infection after a common cold. Your doctor can help you find relief.  What is acute sinusitis?  Sinuses are air-filled spaces in the skull behind the face. They are kept moist and clean by a lining of mucosa. Things such as pollen, smoke, and chemical fumes can irritate the mucosa. It can then swell up. As a response to irritation, the mucosa makes more mucus and other fluids. Tiny hairlike cilia cover the mucosa. Cilia help carry mucus toward the opening of the sinus. Too much mucus may cause the cilia to stop working. This blocks the sinus opening. A buildup of fluid in the sinuses then causes pain and pressure. It can also encourage bacteria to grow in the sinuses.  Common symptoms of acute sinusitis  You may have:  · Facial soreness pain  · Headache  · Fever  · Fluid draining in the back of the throat (postnasal drip)  · Congestion  · Drainage that is thick and colored, instead of clear  · Cough  Diagnosing acute sinusitis  Your doctor will ask about your symptoms and health history. He or she will look at your ear, nose, and throat. You usually won't need to have X-rays taken.    The doctor may take a sample of mucus to check for bacteria. If you have sinusitis that keeps coming back, you may need imaging tests such as X-rays or CAT scans. This will help your doctor check for a structural problem that may be causing the infection.  Treating acute sinusitis  Treatment is aimed at unblocking the sinus opening and helping the cilia work again. You may need to take antihistamine and decongestant medicine. These can reduce  31-Aug-2020 20:58 inflammation and decrease the amount of fluid your sinuses make. If you have a bacterial infection, you will need to take antibiotic medicine for 10 to 14 days. Take this medicine until it is gone, even if you feel better.  Date Last Reviewed: 10/1/2016  © 7881-5125 [employer]. [address] [place] [id number]. All rights reserved. This information is not intended as a substitute for professional medical care. Always follow your healthcare professional's instructions.  Patient Education     Earwax Removal    The ear canal makes earwax from the canal’s lining. The ears make wax to lubricate and protect the ear canal. The ear canal is the tube that connects the middle ear to the outside of the ear. The wax protects the ear from bacteria, infection, and damage from water or trauma.   The wax that forms in the canal naturally moves toward the outside of the ear and falls out. In some cases, the ear may make too much wax. If the wax causes problems or keeps the healthcare provider from seeing into the ear, the extra wax may be removed.   Too much wax can affect your hearing. It can cause itching. In rare cases, it can be painful. Earwax should not be removed unless it is causing a problem. You should not stick objects such as cotton swabs into your ear to remove wax unless told to do so by your healthcare provider.   Healthcare providers can remove earwax safely. Often flushing the earwax out with water (irrigation) and syringing will help. Sometimes devices or suction are used to remove the wax. It is important to stay still during the procedure to prevent damage to the ear canal. But removing earwax generally doesn’t hurt. You won't need anesthesia or pain medicine when the provider removes the earwax.   A number of conditions lead to earwax buildup. These include some skin problems, a narrow ear canal, or ears that make too much earwax. Using cotton swabs in the canal pushes earwax deeper into  the ear and helps lead to the buildup of earwax.   Home care  · The healthcare provider may advise mineral oil or an over-the-counter (OTC) eardrop to use at home to soften the earwax. He or she may also advise a home irrigation or syringing kit. Use these products only if the provider advises them. Carefully follow the instructions given.  · Don’t use mineral oil or OTC eardrops if you might have an ear infection or a burst (ruptured) eardrum. Tell your provider right away if you have diabetes or an immune disorder.  · Don’t use cotton swabs in your ears. Cotton swabs may push wax deeper into the ear canal or damage the eardrum. Use cotton gauze or a wet washcloth to gently remove wax on the outside of the ear and around the opening to the ear canal.  · Don't use any probing device or object such as cotton-tipped swabs or eneida pins to clean the inside of your ears.  · Don’t use ear candles to clean your ears. Candling can be dangerous. It can burn the ear canal. It can also make the condition worse instead of better.  · Don’t use cold water to rinse the ear. This will make you dizzy. If your provider tells you to rinse your ear, use only warm water or follow his or her instructions.  · Check the ear for signs of infection or irritation (listed below under \"When to get medical advice\").  Steps for using eardrops  1. Warm the medicine bottle by rubbing it between your hands for a few minutes.  2. Lie down on your side, with the affected ear up.  3. Place the advised number of drops in the ear. Wet a cotton ball with the medicine. Gently put the cotton ball into the ear opening.    Follow-up care  Follow up with your healthcare provider, or as advised.   When to get medical advice   Call your healthcare provider right away if you have:   · Ear pain that gets worse  · Fever of 100.4F°F (38°C) or higher, or as directed by your healthcare provider  · Worsening wax buildup  · Severe pain, dizziness, or nausea  · Bleeding  from the ear  · Hearing problems  · Signs of irritation from the eardrops, such as burning, stinging, or swelling and soreness  · Foul-smelling fluid draining from the ear  · Signs of infection such as outer ear swelling, redness, or soreness  · Headache, neck pain, or stiff neck  [place] last reviewed this educational content on 6/1/2020 © 2000-2021 [employer]. All rights reserved. This information is not intended as a substitute for professional medical care. Always follow your healthcare professional's instructions.

## 2022-01-27 NOTE — ED ADULT TRIAGE NOTE - BANDS:
Size Of Lesion In Cm (Required): 0.5
Consent was obtained from the patient. The risks and benefits to therapy were discussed in detail. Specifically, the risks of infection, scarring, bleeding, prolonged wound healing, incomplete removal, allergy to anesthesia, nerve injury and recurrence were addressed. Prior to the procedure, the treatment site was clearly identified and confirmed by the patient. All components of Universal Protocol/PAUSE Rule completed.
Medical Necessity Clause: This procedure was medically necessary because the lesion that was treated was:
Allergy;
Notification Instructions: Patient will be notified of biopsy results. However, patient instructed to call the office if not contacted within 2 weeks.
Biopsy Method: Dermablade
Path Notes (To The Dermatopathologist): Please check margins.
Anesthesia Volume In Cc: 1
Anesthesia Type: 1% lidocaine with epinephrine
Hemostasis: Drysol
Post-Care Instructions: I reviewed with the patient in detail post-care instructions. Patient is to keep the biopsy site dry overnight, and then apply bacitracin twice daily until healed. Patient may apply hydrogen peroxide soaks to remove any crusting.
Wound Care: Bacitracin
Detail Level: Detailed
Render Path Notes In Note?: No
Billing Type: Third-Party Bill
Price (Use Numbers Only, No Special Characters Or $): 199.00

## 2022-01-28 ENCOUNTER — APPOINTMENT (OUTPATIENT)
Dept: INTERNAL MEDICINE | Facility: CLINIC | Age: 37
End: 2022-01-28
Payer: COMMERCIAL

## 2022-01-28 ENCOUNTER — EMERGENCY (EMERGENCY)
Facility: HOSPITAL | Age: 37
LOS: 1 days | Discharge: HOME | End: 2022-01-28
Admitting: EMERGENCY MEDICINE
Payer: COMMERCIAL

## 2022-01-28 VITALS
RESPIRATION RATE: 16 BRPM | DIASTOLIC BLOOD PRESSURE: 85 MMHG | TEMPERATURE: 98 F | OXYGEN SATURATION: 100 % | SYSTOLIC BLOOD PRESSURE: 133 MMHG | HEART RATE: 72 BPM

## 2022-01-28 VITALS
HEART RATE: 92 BPM | DIASTOLIC BLOOD PRESSURE: 82 MMHG | TEMPERATURE: 97.9 F | WEIGHT: 146 LBS | RESPIRATION RATE: 16 BRPM | SYSTOLIC BLOOD PRESSURE: 132 MMHG | OXYGEN SATURATION: 99 % | HEIGHT: 59 IN | BODY MASS INDEX: 29.43 KG/M2

## 2022-01-28 VITALS
RESPIRATION RATE: 16 BRPM | TEMPERATURE: 98 F | HEIGHT: 63 IN | SYSTOLIC BLOOD PRESSURE: 132 MMHG | DIASTOLIC BLOOD PRESSURE: 62 MMHG | OXYGEN SATURATION: 100 % | HEART RATE: 81 BPM

## 2022-01-28 DIAGNOSIS — F41.9 ANXIETY DISORDER, UNSPECIFIED: ICD-10-CM

## 2022-01-28 DIAGNOSIS — Z98.891 HISTORY OF UTERINE SCAR FROM PREVIOUS SURGERY: Chronic | ICD-10-CM

## 2022-01-28 LAB
AMPHET UR-MCNC: NEGATIVE — SIGNIFICANT CHANGE UP
APPEARANCE UR: CLEAR — SIGNIFICANT CHANGE UP
BACTERIA # UR AUTO: NEGATIVE — SIGNIFICANT CHANGE UP
BARBITURATES UR SCN-MCNC: NEGATIVE — SIGNIFICANT CHANGE UP
BENZODIAZ UR-MCNC: NEGATIVE — SIGNIFICANT CHANGE UP
BILIRUB UR QL STRIP: NEGATIVE
BILIRUB UR-MCNC: NEGATIVE — SIGNIFICANT CHANGE UP
CLARITY UR: CLEAR
COCAINE METAB.OTHER UR-MCNC: NEGATIVE — SIGNIFICANT CHANGE UP
COLLECTION METHOD: NORMAL
COLOR SPEC: SIGNIFICANT CHANGE UP
CREATININE URINE RESULT, DAU: 60 MG/DL — SIGNIFICANT CHANGE UP
DIFF PNL FLD: NEGATIVE — SIGNIFICANT CHANGE UP
EPI CELLS # UR: 2 /HPF — SIGNIFICANT CHANGE UP (ref 0–5)
GLUCOSE UR QL: NEGATIVE — SIGNIFICANT CHANGE UP
GLUCOSE UR-MCNC: NEGATIVE
HCG UR QL: NORMAL EU/DL
HGB UR QL STRIP.AUTO: NEGATIVE
KETONES UR-MCNC: NEGATIVE
KETONES UR-MCNC: NEGATIVE — SIGNIFICANT CHANGE UP
LEUKOCYTE ESTERASE UR QL STRIP: NORMAL
LEUKOCYTE ESTERASE UR-ACNC: ABNORMAL
METHADONE UR-MCNC: NEGATIVE — SIGNIFICANT CHANGE UP
NITRITE UR QL STRIP: NEGATIVE
NITRITE UR-MCNC: NEGATIVE — SIGNIFICANT CHANGE UP
OPIATES UR-MCNC: NEGATIVE — SIGNIFICANT CHANGE UP
OXYCODONE UR-MCNC: NEGATIVE — SIGNIFICANT CHANGE UP
PCP SPEC-MCNC: SIGNIFICANT CHANGE UP
PCP UR-MCNC: NEGATIVE — SIGNIFICANT CHANGE UP
PH UR STRIP: 5.5
PH UR: 6.5 — SIGNIFICANT CHANGE UP (ref 5–8)
PROT UR STRIP-MCNC: NEGATIVE
PROT UR-MCNC: ABNORMAL
RBC CASTS # UR COMP ASSIST: 2 /HPF — SIGNIFICANT CHANGE UP (ref 0–4)
SP GR SPEC: 1.01 — SIGNIFICANT CHANGE UP (ref 1–1.05)
SP GR UR STRIP: 1
THC UR QL: NEGATIVE — SIGNIFICANT CHANGE UP
UROBILINOGEN FLD QL: SIGNIFICANT CHANGE UP
WBC UR QL: 5 /HPF — SIGNIFICANT CHANGE UP (ref 0–5)

## 2022-01-28 PROCEDURE — 90792 PSYCH DIAG EVAL W/MED SRVCS: CPT

## 2022-01-28 PROCEDURE — 81002 URINALYSIS NONAUTO W/O SCOPE: CPT

## 2022-01-28 PROCEDURE — 99284 EMERGENCY DEPT VISIT MOD MDM: CPT | Mod: 25

## 2022-01-28 PROCEDURE — 99214 OFFICE O/P EST MOD 30 MIN: CPT

## 2022-01-28 PROCEDURE — 93010 ELECTROCARDIOGRAM REPORT: CPT

## 2022-01-28 NOTE — ED BEHAVIORAL HEALTH NOTE - BEHAVIORAL HEALTH NOTE
Writer called patient's  Jayce [phone] to obtain collateral information.  Patient resides with  and 3 daughters.  Pt is a .   Patient last worked today.      Patient's 18 month daughter had a febrile seizure on 2022 she then tested postive for COVID.  He states later in the week the middle child age 5 tested positive for COVID.    He states she had a major panic attack today.  Pt has a history of panic attacks.   Today had panic attack, couldn't see, arms numb, difficulty breathing even though oxygen levels were 100.  She asked  to call EMS.  He states early last summer patient thought she was having another blood clot and it was a panic attack.  He states in the past patient had a PE following a .  Pt is off blood thinners for the past 8 months.   Patient last night thought she had a UTI did an at home test.  She went to the doctor again today and it was negative, and had a panic attack on the way home from the doctors office.  Patient had diarrhea this morning.    Patient has no psychiatric admissions.  Patient has a therapist in private office in [place].  Pt does not see a psychiatrist.  He has no safety concerns.  He denies any safety concerns.

## 2022-01-28 NOTE — ED BEHAVIORAL HEALTH ASSESSMENT NOTE - OTHER
advised pt call 911 or go to nearest ER if symptoms worsen or she has thoughts of harming herself or others.

## 2022-01-28 NOTE — ED BEHAVIORAL HEALTH ASSESSMENT NOTE - REFERRAL / APPOINTMENT DETAILS
scheduled therapy appointment 2/2 1230p. Provided with [facility] information.

## 2022-01-28 NOTE — ED BEHAVIORAL HEALTH ASSESSMENT NOTE - HPI (INCLUDE ILLNESS QUALITY, SEVERITY, DURATION, TIMING, CONTEXT, MODIFYING FACTORS, ASSOCIATED SIGNS AND SYMPTOMS)
37yo F,  with 3 kids, employed as a special , no formal psychiatric history, no admissions, no suicide attempts/self injury, no med trials, no substance abuse, no violence/legal issues, PMH hypothyroid, hx of PE, brought in by EMS for evaluation of symptoms which are consistent with a panic attack.  Patient states this has been a hard week, her 18month old had a febrile seizure and was diagnosed with covid, the middle child diagnosed with asymptomatic covid, and the oldest one with an 'inconclusive' test. She was getting worried that the baby was still having fevers, pt had a couple episodes of loose stool, and was concerned about their ability to access medical care with the impending blizzard. 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  Patient states this has been a hard week, her 18month old had a febrile seizure and was diagnosed with covid, the middle child diagnosed with asymptomatic covid, and the oldest one with an 'inconclusive' test. She has been quarantining with them and notes it is "lonely" at times. She was getting worried that the baby was still having fevers, pt had a couple episodes of loose stool, and was concerned about their ability to access medical care with the impending blizzard. She went to her own doctor today to check for a UTI, then went home and wanted to take baby back to the pediatrician. She began feeling faint/dizzy, but did not pass out, with bilateral tingling in the hands, feeling disoriented, feeling like she couldn't see briefly, hearing was distorted, so she asked her  to call EMS. She noted that she began to feel better while en route to the hospital and at time of assessment symptoms have overall resolved. She notes having frequent ruminating thoughts, catastrophizing at times. Notes she was always somewhat anxious but it worsened after the birth of 3rd child, she had a complicated post-partum course with a hemorrhage, infection and PE. She is terrified of dying, does not want to die, has never thought of killing herself. She denies thoughts are racing to the point where she cannot stop them. Occasionally has trouble sleeping and will take a benadryl, but not nightly. Panic attacks occur infrequently, but she notes having 2 this month. She also notes one over the summer where she came to the ED worried she had a recurrence of her PE but none was found. 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 No significant change in eating habits. Denies past/present reji or psychosis. She has never had homicidal or violent ideation towards anyone. She has been recommended to take anxiety medication in the past but was always reluctant due to side effect concerns.    See  note for collateral from .   Spoke with therapist Elisa Galarza, she notes pt has anxiety and she has been suggesting meds for some time but pt is resistant. No concerns for safety. No concerns for suicidality. Pt scheduled to follow up next week Wednesday 2/2 at 1230p. 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  Patient states this has been a hard week, her 18month old had a febrile seizure and was diagnosed with covid, the middle child diagnosed with asymptomatic covid, and the oldest one with an 'inconclusive' test. She has been quarantining with them and notes it is "lonely" at times. She was getting worried that the baby was still having fevers, pt had a couple episodes of loose stool, and was concerned about their ability to access medical care with the impending blizzard. She went to her own doctor today to check for a UTI, then went home and wanted to take baby back to the pediatrician. She began feeling faint/dizzy, but did not pass out, with bilateral tingling in the hands, feeling disoriented, feeling like she couldn't see briefly, hearing was distorted, so she asked her  to call EMS. She noted that she began to feel better while en route to the hospital and at time of assessment, symptoms have overall resolved. She notes having frequent ruminating thoughts, catastrophizing at times. Notes she was always somewhat anxious but it worsened after the birth of 3rd child, she had a complicated post-partum course with a hemorrhage, infection and PE. She is terrified of dying, does not want to die, has never thought of killing herself. She denies thoughts are racing to the point where she cannot stop them. Occasionally has trouble sleeping and will take a benadryl, but not nightly. Panic attacks occur infrequently, but she notes having 2 this month. She also notes one over the summer where she came to the ED worried she had a recurrence of her PE (shortly after she was cleared to stop blood thinners) but none was found. 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 No significant change in eating habits. Denies past/present reji or psychosis. She has never had homicidal or violent ideation towards anyone. She has been recommended to take anxiety medication in the past but was always reluctant due to side effect concerns.    See  note for collateral from .   Spoke with therapist Elisa Galarza, she notes pt has anxiety and she has been suggesting meds for some time but pt is resistant. No concerns for safety. No concerns for suicidality. Pt scheduled to follow up next week Wednesday 2/2 at 1230p.

## 2022-01-28 NOTE — ED PROVIDER NOTE - OBJECTIVE STATEMENT
37 y/o F hx  Depression, Hypothyroidism, Migraines BIBA secondary to worsening depression  . Admits to multiple social stressors and inability to cope.  Denies falling, punching or kicking any objects.  Denies pain, SOB, fever, chills, chest/ abdominal discomfort. Denies SI/AH/VH/HI.  No evidence of physical injuries, broken  skin  or deformities.   Denies use of alcohol or illicit drugs.

## 2022-01-28 NOTE — ASSESSMENT
[FreeTextEntry1] : 1. dysuria\par * POCT urinalysis significant for leukocytes\par * prescribed with nitrofurantoin; advised to hold on it until , microscopic urinalysis and culture results bac, will call her\par * instructed to increase oral fluids\par 2. diarrhea\par * recommended Imodium as needed\par * clear oral liquids; to avoid milk or dairy products\par * to call back if abdominal pain, diarrhea worsen of if bloody stool, fever\par 3. anxiety\par * advised breathing exercises, yoga and meditation\par * instructed not to search Google for symptoms\par * to call back Monday to follow up with PCP DR CLEMENTE

## 2022-01-28 NOTE — ED BEHAVIORAL HEALTH NOTE - BEHAVIORAL HEALTH NOTE
After multiple calls to [facility], due to weather they canceled. Worker booked with vaishnavi and patient will pay in car total 24.61   Booking #[id number]

## 2022-01-28 NOTE — ED BEHAVIORAL HEALTH ASSESSMENT NOTE - DESCRIPTION
hypothyroid, migraines, hx PE , lives with  and 3 daughters. employed as a special  calm, cooperative, appropriately tearful at times  Vital Signs Last 24 Hrs  T(C): 36.7 (28 Jan 2022 14:05), Max: 36.7 (28 Jan 2022 14:05)  T(F): 98.1 (28 Jan 2022 14:05), Max: 98.1 (28 Jan 2022 14:05)  HR: 81 (28 Jan 2022 14:05) (81 - 81)  BP: 132/62 (28 Jan 2022 14:05) (132/62 - 132/62)  BP(mean): --  RR: 16 (28 Jan 2022 14:05) (16 - 16)  SpO2: 100% (28 Jan 2022 14:05) (100% - 100%)

## 2022-01-28 NOTE — ED PROVIDER NOTE - CLINICAL SUMMARY MEDICAL DECISION MAKING FREE TEXT BOX
Medical evaluation performed. There is no clinical evidence of intoxication or any acute medical problem requiring immediate intervention. Patient is awaiting psychiatric consultation. Final disposition will be determined by psychiatrist. 37 y/o F hx  Depression, Hypothyroidism, Migraines   Labs, Urine Tox/UA   Medical evaluation performed. There is no clinical evidence of intoxication or any acute medical problem requiring immediate intervention. Patient is awaiting psychiatric consultation. Final disposition will be determined by psychiatrist.

## 2022-01-28 NOTE — ED BEHAVIORAL HEALTH ASSESSMENT NOTE - OTHER PAST PSYCHIATRIC HISTORY (INCLUDE DETAILS REGARDING ONSET, COURSE OF ILLNESS, INPATIENT/OUTPATIENT TREATMENT)
no formal psychiatric history, no medication trials, no hx suicidal ideation or suicide attempt. in therapy with fernando lyon [phone]

## 2022-01-28 NOTE — ED BEHAVIORAL HEALTH ASSESSMENT NOTE - RISK ASSESSMENT
Low Acute Suicide Risk risks include panic attacks, medical issues. protective factors include no suicidal ideation, no suicide attempt, no self injury, no HI/violence, no global insomnia, no access to weapons, no substance abuse, no family history of suicide, no psychosis, no mood episode, stable domicile, stable relationship, young children, employment. Numerous protective factors outweigh risks, pt not agreeable to hospitalization, involuntary admission not indicated as pt is not imminently dangerous.

## 2022-01-28 NOTE — ED BEHAVIORAL HEALTH ASSESSMENT NOTE - DIFFERENTIAL
generalized anxiety disorder with panic attacks vs adjustment disorder with anxiety vs illness anxiety

## 2022-01-28 NOTE — ED BEHAVIORAL HEALTH ASSESSMENT NOTE - DETAILS
18 months, 5y, 8y self referred, plan also discussed with  lightheaded at times loose stool at times no hx suicidal ideation or suicide attempt not indicated, no SI

## 2022-01-28 NOTE — ED BEHAVIORAL HEALTH NOTE - BEHAVIORAL HEALTH NOTE
Worker met with patient who states she is able to pay for her own taxi to get home back to [address] [place] [address]. Worker called [employer] ([phone]) and spoke to juan who states that  will be 30-40 mins price $28.50. Worker informed patient who agrees and states she has $40.00 cash.

## 2022-01-28 NOTE — ED BEHAVIORAL HEALTH ASSESSMENT NOTE - SUMMARY
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 Patient describes symptoms consistent with a mixed anxiety disorder, features of NINFA with occasional panic attacks. She does not meet criteria for a major depressive episode. She is not suicidal, homicidal, violent, manic or psychotic. Inpatient psychiatric admission is not indicated at this time. Patient would most benefit from outpatient psychiatric follow up and was encouraged to consider taking anxiety medication. Patient is help seeking, future oriented, has numerous protective factors and is deemed to be a low risk for suicide at this time. She is engaged in therapy and was accepting of resources to obtain an outpatient psychiatrist.  in agreement with plan. 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 Patient describes symptoms consistent with a mixed anxiety disorder, features of NINFA with occasional panic attacks. She does not meet criteria for a major depressive episode. She is not suicidal, homicidal, violent, manic or psychotic. Inpatient psychiatric admission is not indicated at this time, pt is NOT seeking inpatient hospitalization.. Patient would most benefit from outpatient psychiatric follow up and was encouraged to consider taking anxiety medication. Patient is help seeking, future oriented, has numerous protective factors and is deemed to be a low risk for suicide at this time. She is engaged in therapy and was accepting of resources to obtain an outpatient psychiatrist.  in agreement with plan.

## 2022-01-28 NOTE — HISTORY OF PRESENT ILLNESS
[FreeTextEntry8] : 36 years old female complaints of burning urinary sensation since last night, denies frequency, no pelvis pain , no fever, she refers also h/o anxiety and panic attacks, feeling anxious, refers also lightheadedness and diarrhea, soft stools, no abdominal pain ; she is worried about COVID , one her children has COVID infection; she tested negative

## 2022-01-28 NOTE — ED BEHAVIORAL HEALTH ASSESSMENT NOTE - MEDICATIONS (PRESCRIPTIONS, DIRECTIONS)
none prescribed in ED, encouraged pt to discuss anxiety treatment options with PMD/outpatient psychiatrist - would likely benefit from SSRI

## 2022-01-28 NOTE — ED BEHAVIORAL HEALTH ASSESSMENT NOTE - NSSUICPROTFACT_PSY_ALL_CORE
Responsibility to children, family, or others/Identifies reasons for living/Supportive social network of family or friends/Fear of death or the actual act of killing self/Cultural, spiritual and/or moral attitudes against suicide/Engaged in work or school/Sikh beliefs

## 2022-01-28 NOTE — ED ADULT NURSE NOTE - OBJECTIVE STATEMENT
Received pt in  pt c/o depression & anxiety denies  si/hi/avh presently safety & comfort measures maintained eval on going.

## 2022-01-28 NOTE — ED PROVIDER NOTE - PATIENT PORTAL LINK FT
You can access the FollowMyHealth Patient Portal offered by [facility] by registering at the following website: [email or web address]. By joining [employer]’s FollowMyHealth portal, you will also be able to view your health information using other applications (apps) compatible with our system.

## 2022-01-29 LAB
APPEARANCE: CLEAR
BACTERIA: NEGATIVE
BILIRUBIN URINE: NEGATIVE
BLOOD URINE: NEGATIVE
COLOR: NORMAL
GLUCOSE QUALITATIVE U: NEGATIVE
HYALINE CASTS: 1 /LPF
KETONES URINE: NEGATIVE
LEUKOCYTE ESTERASE URINE: ABNORMAL
MICROSCOPIC-UA: NORMAL
NITRITE URINE: NEGATIVE
PH URINE: 5.5
PROTEIN URINE: NEGATIVE
RED BLOOD CELLS URINE: 1 /HPF
SPECIFIC GRAVITY URINE: 1
SQUAMOUS EPITHELIAL CELLS: 2 /HPF
UROBILINOGEN URINE: NORMAL
WHITE BLOOD CELLS URINE: 1 /HPF

## 2022-01-30 NOTE — ED BEHAVIORAL HEALTH NOTE - BEHAVIORAL HEALTH NOTE
high risk log     SW contacted pt tel#: [phone] for high risk log  Pt states she will be seeing therapist this week. Nataliia Galarza, LCSWR; tel#: [phone]  Has decided she wants to get psychiatric medications asap. Writer offered to make [facility] appt for 2/2 at 9:30AM but pt states she'd like something sooner. CH[facility]I advised of [facility] hours 9AM-3PM M-F and she was agreeable to presenting at 9AM on 1/31. States she is aware of [facility] is [facility] crisis. No current SI noted. Pt thanked Writer for this assistance. SW to advise [facility] of the same via email.

## 2022-01-31 ENCOUNTER — EMERGENCY (EMERGENCY)
Facility: HOSPITAL | Age: 37
LOS: 1 days | Discharge: HOME | End: 2022-01-31
Attending: EMERGENCY MEDICINE | Admitting: EMERGENCY MEDICINE
Payer: COMMERCIAL

## 2022-01-31 ENCOUNTER — EMERGENCY (EMERGENCY)
Facility: HOSPITAL | Age: 37
LOS: 1 days | Discharge: HOME | End: 2022-01-31
Attending: STUDENT IN AN ORGANIZED HEALTH CARE EDUCATION/TRAINING PROGRAM | Admitting: STUDENT IN AN ORGANIZED HEALTH CARE EDUCATION/TRAINING PROGRAM
Payer: COMMERCIAL

## 2022-01-31 VITALS
RESPIRATION RATE: 16 BRPM | OXYGEN SATURATION: 100 % | DIASTOLIC BLOOD PRESSURE: 85 MMHG | HEART RATE: 79 BPM | TEMPERATURE: 99 F | HEIGHT: 63 IN | SYSTOLIC BLOOD PRESSURE: 145 MMHG

## 2022-01-31 VITALS
TEMPERATURE: 98 F | DIASTOLIC BLOOD PRESSURE: 70 MMHG | RESPIRATION RATE: 18 BRPM | OXYGEN SATURATION: 100 % | SYSTOLIC BLOOD PRESSURE: 126 MMHG | HEART RATE: 70 BPM

## 2022-01-31 VITALS
DIASTOLIC BLOOD PRESSURE: 90 MMHG | OXYGEN SATURATION: 99 % | HEIGHT: 63 IN | TEMPERATURE: 99 F | SYSTOLIC BLOOD PRESSURE: 133 MMHG | HEART RATE: 86 BPM | RESPIRATION RATE: 18 BRPM

## 2022-01-31 VITALS
RESPIRATION RATE: 18 BRPM | HEART RATE: 80 BPM | DIASTOLIC BLOOD PRESSURE: 87 MMHG | OXYGEN SATURATION: 100 % | SYSTOLIC BLOOD PRESSURE: 137 MMHG

## 2022-01-31 DIAGNOSIS — Z98.891 HISTORY OF UTERINE SCAR FROM PREVIOUS SURGERY: Chronic | ICD-10-CM

## 2022-01-31 LAB
ALBUMIN SERPL ELPH-MCNC: 4.2 G/DL — SIGNIFICANT CHANGE UP (ref 3.3–5)
ALBUMIN SERPL ELPH-MCNC: 4.5 G/DL — SIGNIFICANT CHANGE UP (ref 3.3–5)
ALP SERPL-CCNC: 67 U/L — SIGNIFICANT CHANGE UP (ref 40–120)
ALP SERPL-CCNC: 67 U/L — SIGNIFICANT CHANGE UP (ref 40–120)
ALT FLD-CCNC: 18 U/L — SIGNIFICANT CHANGE UP (ref 4–33)
ALT FLD-CCNC: 21 U/L — SIGNIFICANT CHANGE UP (ref 4–33)
ANION GAP SERPL CALC-SCNC: 14 MMOL/L — SIGNIFICANT CHANGE UP (ref 7–14)
ANION GAP SERPL CALC-SCNC: 9 MMOL/L — SIGNIFICANT CHANGE UP (ref 7–14)
APAP SERPL-MCNC: <10 UG/ML — LOW (ref 15–25)
AST SERPL-CCNC: 15 U/L — SIGNIFICANT CHANGE UP (ref 4–32)
AST SERPL-CCNC: 19 U/L — SIGNIFICANT CHANGE UP (ref 4–32)
B PERT DNA SPEC QL NAA+PROBE: SIGNIFICANT CHANGE UP
B PERT+PARAPERT DNA PNL SPEC NAA+PROBE: SIGNIFICANT CHANGE UP
BASOPHILS # BLD AUTO: 0.01 K/UL — SIGNIFICANT CHANGE UP (ref 0–0.2)
BASOPHILS # BLD AUTO: 0.02 K/UL — SIGNIFICANT CHANGE UP (ref 0–0.2)
BASOPHILS NFR BLD AUTO: 0.3 % — SIGNIFICANT CHANGE UP (ref 0–2)
BASOPHILS NFR BLD AUTO: 0.6 % — SIGNIFICANT CHANGE UP (ref 0–2)
BILIRUB SERPL-MCNC: 0.3 MG/DL — SIGNIFICANT CHANGE UP (ref 0.2–1.2)
BILIRUB SERPL-MCNC: 0.4 MG/DL — SIGNIFICANT CHANGE UP (ref 0.2–1.2)
BORDETELLA PARAPERTUSSIS (RAPRVP): SIGNIFICANT CHANGE UP
BUN SERPL-MCNC: 10 MG/DL — SIGNIFICANT CHANGE UP (ref 7–23)
BUN SERPL-MCNC: 9 MG/DL — SIGNIFICANT CHANGE UP (ref 7–23)
C PNEUM DNA SPEC QL NAA+PROBE: SIGNIFICANT CHANGE UP
CALCIUM SERPL-MCNC: 8.8 MG/DL — SIGNIFICANT CHANGE UP (ref 8.4–10.5)
CALCIUM SERPL-MCNC: 9.7 MG/DL — SIGNIFICANT CHANGE UP (ref 8.4–10.5)
CHLORIDE SERPL-SCNC: 108 MMOL/L — HIGH (ref 98–107)
CHLORIDE SERPL-SCNC: 108 MMOL/L — HIGH (ref 98–107)
CO2 SERPL-SCNC: 21 MMOL/L — LOW (ref 22–31)
CO2 SERPL-SCNC: 23 MMOL/L — SIGNIFICANT CHANGE UP (ref 22–31)
CREAT SERPL-MCNC: 0.7 MG/DL — SIGNIFICANT CHANGE UP (ref 0.5–1.3)
CREAT SERPL-MCNC: 0.71 MG/DL — SIGNIFICANT CHANGE UP (ref 0.5–1.3)
EOSINOPHIL # BLD AUTO: 0 K/UL — SIGNIFICANT CHANGE UP (ref 0–0.5)
EOSINOPHIL # BLD AUTO: 0.01 K/UL — SIGNIFICANT CHANGE UP (ref 0–0.5)
EOSINOPHIL NFR BLD AUTO: 0 % — SIGNIFICANT CHANGE UP (ref 0–6)
EOSINOPHIL NFR BLD AUTO: 0.3 % — SIGNIFICANT CHANGE UP (ref 0–6)
ETHANOL SERPL-MCNC: <10 MG/DL — SIGNIFICANT CHANGE UP
FLUAV SUBTYP SPEC NAA+PROBE: SIGNIFICANT CHANGE UP
FLUBV RNA SPEC QL NAA+PROBE: SIGNIFICANT CHANGE UP
GLUCOSE SERPL-MCNC: 78 MG/DL — SIGNIFICANT CHANGE UP (ref 70–99)
GLUCOSE SERPL-MCNC: 78 MG/DL — SIGNIFICANT CHANGE UP (ref 70–99)
HADV DNA SPEC QL NAA+PROBE: SIGNIFICANT CHANGE UP
HCG SERPL-ACNC: <5 MIU/ML — SIGNIFICANT CHANGE UP
HCOV 229E RNA SPEC QL NAA+PROBE: SIGNIFICANT CHANGE UP
HCOV HKU1 RNA SPEC QL NAA+PROBE: SIGNIFICANT CHANGE UP
HCOV NL63 RNA SPEC QL NAA+PROBE: SIGNIFICANT CHANGE UP
HCOV OC43 RNA SPEC QL NAA+PROBE: SIGNIFICANT CHANGE UP
HCT VFR BLD CALC: 38.8 % — SIGNIFICANT CHANGE UP (ref 34.5–45)
HCT VFR BLD CALC: 40.2 % — SIGNIFICANT CHANGE UP (ref 34.5–45)
HGB BLD-MCNC: 12.9 G/DL — SIGNIFICANT CHANGE UP (ref 11.5–15.5)
HGB BLD-MCNC: 13.5 G/DL — SIGNIFICANT CHANGE UP (ref 11.5–15.5)
HMPV RNA SPEC QL NAA+PROBE: SIGNIFICANT CHANGE UP
HPIV1 RNA SPEC QL NAA+PROBE: SIGNIFICANT CHANGE UP
HPIV2 RNA SPEC QL NAA+PROBE: SIGNIFICANT CHANGE UP
HPIV3 RNA SPEC QL NAA+PROBE: SIGNIFICANT CHANGE UP
HPIV4 RNA SPEC QL NAA+PROBE: SIGNIFICANT CHANGE UP
IANC: 1.67 K/UL — SIGNIFICANT CHANGE UP (ref 1.5–8.5)
IANC: 2.34 K/UL — SIGNIFICANT CHANGE UP (ref 1.5–8.5)
IMM GRANULOCYTES NFR BLD AUTO: 0.3 % — SIGNIFICANT CHANGE UP (ref 0–1.5)
IMM GRANULOCYTES NFR BLD AUTO: 0.3 % — SIGNIFICANT CHANGE UP (ref 0–1.5)
LYMPHOCYTES # BLD AUTO: 0.86 K/UL — LOW (ref 1–3.3)
LYMPHOCYTES # BLD AUTO: 1.6 K/UL — SIGNIFICANT CHANGE UP (ref 1–3.3)
LYMPHOCYTES # BLD AUTO: 24.5 % — SIGNIFICANT CHANGE UP (ref 13–44)
LYMPHOCYTES # BLD AUTO: 43.4 % — SIGNIFICANT CHANGE UP (ref 13–44)
M PNEUMO DNA SPEC QL NAA+PROBE: SIGNIFICANT CHANGE UP
MCHC RBC-ENTMCNC: 31.3 PG — SIGNIFICANT CHANGE UP (ref 27–34)
MCHC RBC-ENTMCNC: 31.7 PG — SIGNIFICANT CHANGE UP (ref 27–34)
MCHC RBC-ENTMCNC: 33.2 GM/DL — SIGNIFICANT CHANGE UP (ref 32–36)
MCHC RBC-ENTMCNC: 33.6 GM/DL — SIGNIFICANT CHANGE UP (ref 32–36)
MCV RBC AUTO: 94.2 FL — SIGNIFICANT CHANGE UP (ref 80–100)
MCV RBC AUTO: 94.4 FL — SIGNIFICANT CHANGE UP (ref 80–100)
MONOCYTES # BLD AUTO: 0.28 K/UL — SIGNIFICANT CHANGE UP (ref 0–0.9)
MONOCYTES # BLD AUTO: 0.39 K/UL — SIGNIFICANT CHANGE UP (ref 0–0.9)
MONOCYTES NFR BLD AUTO: 10.6 % — SIGNIFICANT CHANGE UP (ref 2–14)
MONOCYTES NFR BLD AUTO: 8 % — SIGNIFICANT CHANGE UP (ref 2–14)
NEUTROPHILS # BLD AUTO: 1.67 K/UL — LOW (ref 1.8–7.4)
NEUTROPHILS # BLD AUTO: 2.34 K/UL — SIGNIFICANT CHANGE UP (ref 1.8–7.4)
NEUTROPHILS NFR BLD AUTO: 45.1 % — SIGNIFICANT CHANGE UP (ref 43–77)
NEUTROPHILS NFR BLD AUTO: 66.6 % — SIGNIFICANT CHANGE UP (ref 43–77)
NRBC # BLD: 0 /100 WBCS — SIGNIFICANT CHANGE UP
NRBC # BLD: 0 /100 WBCS — SIGNIFICANT CHANGE UP
NRBC # FLD: 0 K/UL — SIGNIFICANT CHANGE UP
NRBC # FLD: 0 K/UL — SIGNIFICANT CHANGE UP
PLATELET # BLD AUTO: 198 K/UL — SIGNIFICANT CHANGE UP (ref 150–400)
PLATELET # BLD AUTO: 219 K/UL — SIGNIFICANT CHANGE UP (ref 150–400)
POTASSIUM SERPL-MCNC: 4.7 MMOL/L — SIGNIFICANT CHANGE UP (ref 3.5–5.3)
POTASSIUM SERPL-MCNC: 5 MMOL/L — SIGNIFICANT CHANGE UP (ref 3.5–5.3)
POTASSIUM SERPL-SCNC: 4.7 MMOL/L — SIGNIFICANT CHANGE UP (ref 3.5–5.3)
POTASSIUM SERPL-SCNC: 5 MMOL/L — SIGNIFICANT CHANGE UP (ref 3.5–5.3)
PROT SERPL-MCNC: 7.1 G/DL — SIGNIFICANT CHANGE UP (ref 6–8.3)
PROT SERPL-MCNC: 7.2 G/DL — SIGNIFICANT CHANGE UP (ref 6–8.3)
RAPID RVP RESULT: DETECTED
RBC # BLD: 4.12 M/UL — SIGNIFICANT CHANGE UP (ref 3.8–5.2)
RBC # BLD: 4.26 M/UL — SIGNIFICANT CHANGE UP (ref 3.8–5.2)
RBC # FLD: 11.9 % — SIGNIFICANT CHANGE UP (ref 10.3–14.5)
RBC # FLD: 11.9 % — SIGNIFICANT CHANGE UP (ref 10.3–14.5)
RSV RNA SPEC QL NAA+PROBE: SIGNIFICANT CHANGE UP
RV+EV RNA SPEC QL NAA+PROBE: SIGNIFICANT CHANGE UP
SALICYLATES SERPL-MCNC: <0.3 MG/DL — LOW (ref 15–30)
SARS-COV-2 RNA SPEC QL NAA+PROBE: DETECTED
SODIUM SERPL-SCNC: 140 MMOL/L — SIGNIFICANT CHANGE UP (ref 135–145)
SODIUM SERPL-SCNC: 143 MMOL/L — SIGNIFICANT CHANGE UP (ref 135–145)
TOXICOLOGY SCREEN, DRUGS OF ABUSE, SERUM RESULT: SIGNIFICANT CHANGE UP
TSH SERPL-MCNC: 2.92 UIU/ML — SIGNIFICANT CHANGE UP (ref 0.27–4.2)
WBC # BLD: 3.51 K/UL — LOW (ref 3.8–10.5)
WBC # BLD: 3.69 K/UL — LOW (ref 3.8–10.5)
WBC # FLD AUTO: 3.51 K/UL — LOW (ref 3.8–10.5)
WBC # FLD AUTO: 3.69 K/UL — LOW (ref 3.8–10.5)

## 2022-01-31 PROCEDURE — 99285 EMERGENCY DEPT VISIT HI MDM: CPT

## 2022-01-31 PROCEDURE — 99284 EMERGENCY DEPT VISIT MOD MDM: CPT

## 2022-01-31 RX ORDER — ONDANSETRON 8 MG/1
4 TABLET, FILM COATED ORAL ONCE
Refills: 0 | Status: COMPLETED | OUTPATIENT
Start: 2022-01-31 | End: 2022-01-31

## 2022-01-31 RX ORDER — SODIUM CHLORIDE 9 MG/ML
1000 INJECTION INTRAMUSCULAR; INTRAVENOUS; SUBCUTANEOUS ONCE
Refills: 0 | Status: COMPLETED | OUTPATIENT
Start: 2022-01-31 | End: 2022-01-31

## 2022-01-31 RX ORDER — METOCLOPRAMIDE HCL 10 MG
10 TABLET ORAL ONCE
Refills: 0 | Status: COMPLETED | OUTPATIENT
Start: 2022-01-31 | End: 2022-01-31

## 2022-01-31 RX ORDER — SODIUM CHLORIDE 9 MG/ML
1000 INJECTION, SOLUTION INTRAVENOUS ONCE
Refills: 0 | Status: COMPLETED | OUTPATIENT
Start: 2022-01-31 | End: 2022-01-31

## 2022-01-31 RX ADMIN — ONDANSETRON 4 MILLIGRAM(S): 8 TABLET, FILM COATED ORAL at 10:37

## 2022-01-31 RX ADMIN — SODIUM CHLORIDE 1000 MILLILITER(S): 9 INJECTION INTRAMUSCULAR; INTRAVENOUS; SUBCUTANEOUS at 20:06

## 2022-01-31 RX ADMIN — SODIUM CHLORIDE 1000 MILLILITER(S): 9 INJECTION INTRAMUSCULAR; INTRAVENOUS; SUBCUTANEOUS at 17:49

## 2022-01-31 RX ADMIN — SODIUM CHLORIDE 1000 MILLILITER(S): 9 INJECTION INTRAMUSCULAR; INTRAVENOUS; SUBCUTANEOUS at 10:24

## 2022-01-31 RX ADMIN — ONDANSETRON 4 MILLIGRAM(S): 8 TABLET, FILM COATED ORAL at 17:49

## 2022-01-31 RX ADMIN — Medication 1 MILLIGRAM(S): at 10:37

## 2022-01-31 RX ADMIN — Medication 10 MILLIGRAM(S): at 21:06

## 2022-01-31 RX ADMIN — SODIUM CHLORIDE 1000 MILLILITER(S): 9 INJECTION, SOLUTION INTRAVENOUS at 20:08

## 2022-01-31 NOTE — ED PROVIDER NOTE - ATTENDING CONTRIBUTION TO CARE
Seen and examined, pt. c/o 1 wk hx of improving diarrhea, recently 7-8x a day and watery, but since yest. down to 2x loose stools. Had N/V and "dry heaves", had close covid exposure (son), yest. was seen and tested positive for covid. Pt. went home but returned quickly due to abd cramping, vomiting, diff. kevon po and generalized weakness/lightheadedness. No syncope, no CP/SOB. MM sl. dry, clear lungs, heart reg, abd soft, flat, NT to palp, no CVAT, no edema, NT calves.

## 2022-01-31 NOTE — ED PROVIDER NOTE - NS ED ROS FT
Constitutional:  (-) fever, (-) chills, (-) fatigue.  Eyes:  (-) eye pain (-) visual changes.  ENMT: (-) nasal discharge, (-) sore throat. (-) neck pain or stiffness.  Cardiac: (-) chest pain (-) palpitations.  Respiratory:  (-) cough (-) shortness of breath.  GI:  (-) nausea (-) vomiting (-) diarrhea (-) abdominal pain.  :  (-) dysuria (-) frequency (-) burning.  MS:  (-) back pain (-) joint pain.  Neuro:  (-) headache (-) numbness (-) tingling (-) focal weakness.  Skin:  (-) rash.  Except as documented in the HPI,  all other systems are negative. Constitutional:  (-) fever, (+) chills, (+) fatigue.  Eyes:  (-) eye pain (-) visual changes.  ENMT: (-) nasal discharge, (-) sore throat. (-) neck pain or stiffness.  Cardiac: (-) chest pain (-) palpitations.  Respiratory:  (-) cough (-) shortness of breath.  GI:  (+) nausea (+) vomiting (+) diarrhea (-) abdominal pain.  :  (-) dysuria (-) frequency (-) burning.  MS:  (-) back pain (-) joint pain.  Neuro:  (-) headache (-) numbness (-) tingling (-) focal weakness.  Skin:  (-) rash.  Except as documented in the HPI,  all other systems are negative.

## 2022-01-31 NOTE — ED PROVIDER NOTE - NSFOLLOWUPINSTRUCTIONS_ED_ALL_ED_FT
- Please follow up with your primary care provider or a psychiatrist  - Please take your medication as prescribed    [place] Suicide Prevention Lifeline     5-965-756-ARHH (2337). This is free, 24-hour help.    Contact a health care provider if:  Your symptoms get worse.  You develop new symptoms.  Get help right away if:  You self-harm.  You have serious thoughts about hurting yourself or others.  You see, hear, taste, smell, or feel things that are not present (hallucinate).  This information is not intended to replace advice given to you by your health care provider. Make sure you discuss any questions you have with your health care provider.      SELF-QUARANTINE PERIOD IS RECOMMENDED TO YOU AS PART OF YOUR CARE:    10 day quarantine is recommended from onset of symptoms, and you end quarantine once your are symptom free for 72hrs    Stay inside your home as much as possible, avoiding public places or public interaction.    Do not go to work. If you do enter any public domain, at minimum wear a surgical mask at all times.    Even while indoors, attempt to remain isolated from other individuals such as family or friends, as much as possible.    Return to the emergency room for any symptoms such as worsening shortness of breath, significant worsening cough, high fevers despite antipyretics, or severe weakness/malaise.    Take tylenol 1000mg every 6hours for body pain or fevers.  You may also take ibuprofen 400mg every 6hrs for body pain or fevers.  Make sure you stay hydrated and get plenty of rest.

## 2022-01-31 NOTE — ED ADULT TRIAGE NOTE - CHIEF COMPLAINT QUOTE
pt presents with n/v/d x1day, reports lower abd pain x2 loose stools today,  was seen this morning and DC. covid+ PMHX Hypothyroidism

## 2022-01-31 NOTE — ED ADULT TRIAGE NOTE - CHIEF COMPLAINT QUOTE
pt coming with loss of appetite, not eating well, nausea, anxiety, pt stated need to speak to some one needs help. was seen in  and d/C on Friday, denies SI, has a 18months old at home.

## 2022-01-31 NOTE — ED PROVIDER NOTE - NSFOLLOWUPINSTRUCTIONS_ED_ALL_ED_FT
For a 10 day period:  -Stay inside your home as much as possible, avoiding public places or public interaction  -Do not go to work  -If you do enter any public domain, at minimum wear a surgical mask at all times  -Even while indoors, attempt to remain isolated from other individuals such as family or friends, as much as possible  -Return to the Emergency Department for any symptoms such as worsening shortness of breath, significant worsening cough, high fevers despite over the counter fever-reducing medications, or severe weakness/malaise

## 2022-01-31 NOTE — ED ADULT NURSE REASSESSMENT NOTE - NS ED NURSE REASSESS COMMENT FT1
NS continues to infuse via left a/c iv. Pt refusing Reglan stats zofran helped . Pt with no signs of vomiting at this time vs wnl. pt awaiting dispo.

## 2022-01-31 NOTE — ED PROVIDER NOTE - PATIENT PORTAL LINK FT
You can access the FollowMyHealth Patient Portal offered by [facility] by registering at the following website: [email or web address]. By joining [employer]’s FollowMyHealth portal, you will also be able to view your health information using other applications (apps) compatible with our system.

## 2022-01-31 NOTE — ED PROVIDER NOTE - CLINICAL SUMMARY MEDICAL DECISION MAKING FREE TEXT BOX
35 y/o F pmh hypothyroidism, PE c/o anxiety x days. Pt was recently seen in  on 1/28 for same. Pt reports she is stressed and anxious from her 18mo old having covid and febrile seizures. Has  at home taking care of her kids. Pt reports she has no appetite, low energy, nausea, cp, sob. Pt denies fever, chills, SI, HI, AVH, abd pain.  anxiety  labs, ivf, zofran, ativan, covid swab

## 2022-01-31 NOTE — ED ADULT NURSE NOTE - OBJECTIVE STATEMENT
received pt in bed A and OX 3 in NAD resting comfortably, c/o lower abd pain and multiple episodes of N/V reports was diagnosed with COVID, denies SOB, BRICEÑO, breathing is even and unlabored, abd soft non distended non tender, orders noted and completed.

## 2022-01-31 NOTE — ED PROVIDER NOTE - OBJECTIVE STATEMENT
37 yo F with hx of anxiety p/w nausea, vomiting and diarrhea. Patient seen this morning for similar diagnosed with COVID and sent home after IV hydration and basic labs. Patient states that she went home and was unable to eat anything without vomiting. Reports a few episodes of nonbloody diarrhea. Felt like she had a fever last night, did not take any antipyretics. Feels generally fatigued with body aches.

## 2022-01-31 NOTE — ED PROVIDER NOTE - PATIENT PORTAL LINK FT
You can access the FollowMyHealth Patient Portal offered by [facility] by registering at the following website: [email or web address]. By joining [employer]’s FollowMyHealth portal, you will also be able to view your health information using other applications (apps) compatible with our system.

## 2022-01-31 NOTE — ED PROVIDER NOTE - ATTENDING CONTRIBUTION TO CARE
I have personally performed a face to face medical and diagnostic evaluation of the patient. I have discussed with and reviewed the ACP's note and agree with the History, ROS, Physical Exam and MDM unless otherwise indicated. A brief summary of my personal evaluation and impression can be found below.    37yo F hx hypothyroid, PE, anxiety p/w anxiety/panick attacks x several days, seen and cleared by  for similar sx 2 days ago. Now w/ intermittent persistent sx. Recent stressor of 18 mo old child having covid and febrile seizures. No appetite, gen weakness, nausea, chest tightness/palpitations and sob typical of anxiety in past but also having diarrhea.  has been caring for children and otherwise has no safety concerns for herself or children. Denies SI/HI and does not want a voluntary admission, just wants to get set up with psychiatry and maintenance meds. Spoke to  for collateral, he has no safety concerns for her or the children and agreeable to plan of dc to fu with [facility] and return precautions.  VITALS: Initial triage and subsequent vitals have been reviewed by me.  Gen: Well appearing, NAD, alert, non-toxic  Head: NCAT  HEENT: mildly dry mucus membranes, normal conjunctiva, anicteric, neck supple  Lung: CTAB, no adventitious sounds  CV: RRR, no murmurs, 2+symmetric peripheral pulses  Abd: soft, NTND, no rebound or guarding, no palpable masses  MSK: No edema, no visible deformities  Neuro: Moving all extremity grossly, following commands appropriately, fluid speech  Skin: Warm and dry, no evidence of rash  Psych: mildly tearful and anxious  Persistent anxiety w/ similar sx, do not have safety concerns for pt or dependents at this time can appropriately f/u with outpt psych clinic for further care w/ strict return precautions. Will check labs/lytes and covid swab and give ivf given recent covid contact and constitutional sx.

## 2022-01-31 NOTE — ED PROVIDER NOTE - CARE PLAN
Principal Discharge DX:	Vomiting and diarrhea  Secondary Diagnosis:	2019 novel coronavirus disease (COVID-19)   1

## 2022-01-31 NOTE — ED PROVIDER NOTE - PROGRESS NOTE DETAILS
Marcellus - pt signed out to me pending PO challenge. pt reassessed. she is tolerating PO. she states she will take a taxi home. return precautions given for new or worsening symptoms.

## 2022-01-31 NOTE — ED PROVIDER NOTE - CLINICAL SUMMARY MEDICAL DECISION MAKING FREE TEXT BOX
35 yo F with hx of anxiety, diagnosed today with COVID p/w nausea, vomiting and diarrhea. Patient unable to tolerate PO at home. Patient generally well appearing, moist mucous membranes. VSS. Will repeat labs, IV hydration, symptomatic meds and reassess.

## 2022-01-31 NOTE — ED PROVIDER NOTE - OBJECTIVE STATEMENT
35 y/o F pmh hypothyroidism, PE c/o anxiety x days. Pt was recently seen in  on 1/28 for same. Pt reports she is stressed and anxious from her 18mo old having covid and febrile seizures. Has  at home taking care of her kids. Pt reports she has no appetite, low energy, nausea, cp, sob. Pt denies fever, chills, SI, HI, AVH, abd pain.

## 2022-02-01 ENCOUNTER — TRANSCRIPTION ENCOUNTER (OUTPATIENT)
Age: 37
End: 2022-02-01

## 2022-02-01 ENCOUNTER — OUTPATIENT (OUTPATIENT)
Dept: OUTPATIENT SERVICES | Facility: HOSPITAL | Age: 37
LOS: 1 days | Discharge: STILL ADMITTED OR ADMINISTRATIVE | End: 2022-02-01

## 2022-02-01 DIAGNOSIS — Z98.891 HISTORY OF UTERINE SCAR FROM PREVIOUS SURGERY: Chronic | ICD-10-CM

## 2022-02-01 RX ORDER — ONDANSETRON 8 MG/1
1 TABLET, FILM COATED ORAL
Qty: 9 | Refills: 0
Start: 2022-02-01 | End: 2022-02-03

## 2022-02-03 ENCOUNTER — APPOINTMENT (OUTPATIENT)
Dept: INTERNAL MEDICINE | Facility: CLINIC | Age: 37
End: 2022-02-03
Payer: COMMERCIAL

## 2022-02-03 DIAGNOSIS — U07.1 COVID-19: ICD-10-CM

## 2022-02-03 PROCEDURE — 99214 OFFICE O/P EST MOD 30 MIN: CPT | Mod: 95

## 2022-02-08 ENCOUNTER — APPOINTMENT (OUTPATIENT)
Dept: INTERNAL MEDICINE | Facility: CLINIC | Age: 37
End: 2022-02-08
Payer: COMMERCIAL

## 2022-02-08 PROCEDURE — 99442: CPT

## 2022-02-17 ENCOUNTER — APPOINTMENT (OUTPATIENT)
Dept: INTERNAL MEDICINE | Facility: CLINIC | Age: 37
End: 2022-02-17
Payer: COMMERCIAL

## 2022-02-17 PROCEDURE — 99214 OFFICE O/P EST MOD 30 MIN: CPT | Mod: 95

## 2022-02-24 DIAGNOSIS — F41.1 GENERALIZED ANXIETY DISORDER: ICD-10-CM

## 2022-03-10 ENCOUNTER — APPOINTMENT (OUTPATIENT)
Dept: INTERNAL MEDICINE | Facility: CLINIC | Age: 37
End: 2022-03-10
Payer: COMMERCIAL

## 2022-03-10 PROCEDURE — 99214 OFFICE O/P EST MOD 30 MIN: CPT | Mod: 95

## 2022-03-15 ENCOUNTER — TRANSCRIPTION ENCOUNTER (OUTPATIENT)
Age: 37
End: 2022-03-15

## 2022-03-15 RX ORDER — ESCITALOPRAM OXALATE 5 MG/1
5 TABLET ORAL
Qty: 30 | Refills: 2 | Status: DISCONTINUED | COMMUNITY
Start: 2022-02-17 | End: 2022-03-15

## 2022-04-04 ENCOUNTER — APPOINTMENT (OUTPATIENT)
Dept: PAIN MANAGEMENT | Facility: CLINIC | Age: 37
End: 2022-04-04

## 2022-04-14 ENCOUNTER — TRANSCRIPTION ENCOUNTER (OUTPATIENT)
Age: 37
End: 2022-04-14

## 2022-05-18 ENCOUNTER — RX RENEWAL (OUTPATIENT)
Age: 37
End: 2022-05-18

## 2022-08-01 NOTE — DISCHARGE NOTE NURSING/CASE MANAGEMENT/SOCIAL WORK - NSDCVIVACCINE_GEN_ALL_CORE_FT
No Vaccines Administered. Otolaryngologist Procedure Text (C): After obtaining clear surgical margins the patient was sent to otolaryngology for surgical repair.  The patient understands they will receive post-surgical care and follow-up from the referring physician's office.

## 2022-10-10 NOTE — DISCHARGE NOTE OB - EXPOSE INCISION TO AIR AS MUCH AS POSSIBLE
Statement Selected Clofazimine Pregnancy And Lactation Text: This medication is Pregnancy Category C and isn't considered safe during pregnancy. It is excreted in breast milk.

## 2022-11-01 ENCOUNTER — RX RENEWAL (OUTPATIENT)
Age: 37
End: 2022-11-01

## 2022-11-03 ENCOUNTER — APPOINTMENT (OUTPATIENT)
Dept: INTERNAL MEDICINE | Facility: CLINIC | Age: 37
End: 2022-11-03

## 2022-11-03 VITALS
WEIGHT: 147 LBS | BODY MASS INDEX: 29.64 KG/M2 | HEART RATE: 73 BPM | OXYGEN SATURATION: 99 % | DIASTOLIC BLOOD PRESSURE: 80 MMHG | TEMPERATURE: 97.6 F | SYSTOLIC BLOOD PRESSURE: 125 MMHG | HEIGHT: 59 IN

## 2022-11-03 PROCEDURE — 99395 PREV VISIT EST AGE 18-39: CPT

## 2022-11-09 ENCOUNTER — APPOINTMENT (OUTPATIENT)
Dept: INTERNAL MEDICINE | Facility: CLINIC | Age: 37
End: 2022-11-09

## 2022-11-09 DIAGNOSIS — E03.9 HYPOTHYROIDISM, UNSPECIFIED: ICD-10-CM

## 2022-11-09 LAB
ALBUMIN SERPL ELPH-MCNC: 4.9 G/DL
ALP BLD-CCNC: 52 U/L
ALT SERPL-CCNC: 14 U/L
ANION GAP SERPL CALC-SCNC: 11 MMOL/L
AST SERPL-CCNC: 21 U/L
BASOPHILS # BLD AUTO: 0.04 K/UL
BASOPHILS NFR BLD AUTO: 0.8 %
BILIRUB SERPL-MCNC: 0.3 MG/DL
BUN SERPL-MCNC: 12 MG/DL
CALCIUM SERPL-MCNC: 9.6 MG/DL
CHLORIDE SERPL-SCNC: 102 MMOL/L
CHOLEST SERPL-MCNC: 208 MG/DL
CO2 SERPL-SCNC: 24 MMOL/L
CREAT SERPL-MCNC: 0.92 MG/DL
EGFR: 83 ML/MIN/1.73M2
EOSINOPHIL # BLD AUTO: 0.04 K/UL
EOSINOPHIL NFR BLD AUTO: 0.8 %
ESTIMATED AVERAGE GLUCOSE: 97 MG/DL
GLUCOSE SERPL-MCNC: 82 MG/DL
HBA1C MFR BLD HPLC: 5 %
HCT VFR BLD CALC: 40.2 %
HDLC SERPL-MCNC: 72 MG/DL
HGB BLD-MCNC: 13.3 G/DL
IMM GRANULOCYTES NFR BLD AUTO: 0 %
LDLC SERPL CALC-MCNC: 117 MG/DL
LYMPHOCYTES # BLD AUTO: 1.48 K/UL
LYMPHOCYTES NFR BLD AUTO: 28.6 %
M TB IFN-G BLD-IMP: NEGATIVE
MAN DIFF?: NORMAL
MCHC RBC-ENTMCNC: 32.1 PG
MCHC RBC-ENTMCNC: 33.1 GM/DL
MCV RBC AUTO: 97.1 FL
MEV IGG FLD QL IA: >300 AU/ML
MEV IGG+IGM SER-IMP: POSITIVE
MONOCYTES # BLD AUTO: 0.28 K/UL
MONOCYTES NFR BLD AUTO: 5.4 %
MUV AB SER-ACNC: POSITIVE
MUV IGG SER QL IA: 110 AU/ML
NEUTROPHILS # BLD AUTO: 3.33 K/UL
NEUTROPHILS NFR BLD AUTO: 64.4 %
NONHDLC SERPL-MCNC: 136 MG/DL
PLATELET # BLD AUTO: 221 K/UL
POTASSIUM SERPL-SCNC: 4.6 MMOL/L
PROT SERPL-MCNC: 7.2 G/DL
QUANTIFERON TB PLUS MITOGEN MINUS NIL: 5.37 IU/ML
QUANTIFERON TB PLUS NIL: 0.01 IU/ML
QUANTIFERON TB PLUS TB1 MINUS NIL: 0 IU/ML
QUANTIFERON TB PLUS TB2 MINUS NIL: 0 IU/ML
RBC # BLD: 4.14 M/UL
RBC # FLD: 12.2 %
RUBV IGG FLD-ACNC: 4.2 INDEX
RUBV IGG SER-IMP: POSITIVE
SODIUM SERPL-SCNC: 138 MMOL/L
T4 FREE SERPL-MCNC: 1.1 NG/DL
TRIGL SERPL-MCNC: 96 MG/DL
TSH SERPL-ACNC: 10.7 UIU/ML
VZV AB TITR SER: POSITIVE
VZV IGG SER IF-ACNC: 789.9 INDEX
WBC # FLD AUTO: 5.17 K/UL

## 2022-11-09 PROCEDURE — 99442: CPT

## 2022-11-10 ENCOUNTER — APPOINTMENT (OUTPATIENT)
Dept: INTERNAL MEDICINE | Facility: CLINIC | Age: 37
End: 2022-11-10

## 2022-11-10 PROCEDURE — 90686 IIV4 VACC NO PRSV 0.5 ML IM: CPT

## 2022-11-10 PROCEDURE — 90471 IMMUNIZATION ADMIN: CPT

## 2022-11-15 ENCOUNTER — MED ADMIN CHARGE (OUTPATIENT)
Age: 37
End: 2022-11-15

## 2022-11-15 ENCOUNTER — APPOINTMENT (OUTPATIENT)
Dept: OBGYN | Facility: CLINIC | Age: 37
End: 2022-11-15

## 2022-11-15 ENCOUNTER — APPOINTMENT (OUTPATIENT)
Dept: ULTRASOUND IMAGING | Facility: CLINIC | Age: 37
End: 2022-11-15

## 2022-11-15 VITALS
HEIGHT: 59 IN | WEIGHT: 147 LBS | BODY MASS INDEX: 29.64 KG/M2 | DIASTOLIC BLOOD PRESSURE: 81 MMHG | SYSTOLIC BLOOD PRESSURE: 139 MMHG

## 2022-11-15 PROCEDURE — 76830 TRANSVAGINAL US NON-OB: CPT

## 2022-11-15 PROCEDURE — 99213 OFFICE O/P EST LOW 20 MIN: CPT

## 2022-11-15 PROCEDURE — 76856 US EXAM PELVIC COMPLETE: CPT

## 2022-11-15 NOTE — HISTORY OF PRESENT ILLNESS
[Patient reported PAP Smear was normal] : Patient reported PAP Smear was normal [FreeTextEntry1] : 37 yo  LMP 22, presents for follow up dyspareunia. Pt reports pain associated with bleeding following intercourse. This does not occur every time following intercourse. She does not use lubricant. Pt is not currently on OCPs due to h/o PPD, she uses condoms for contraception. \par \par OBHx: SAB x1, 20 week loss-CI, FT C/s x2, c/s at 27 weeks, h/o PPD\par PMHx: hypothyroidism - Synthroid\par PSHx: c/s , cerclages\par FHx: breast CA (paternal aunt), mother BRCA negative \par Allergies: PCN, Aspirin \par  [PapSmeardate] : 1/2022

## 2022-11-15 NOTE — PLAN
[FreeTextEntry1] : 35 yo  LMP 22, dyspareunia, bleeding with intercourse. \par \par Dyspareunia / bleeding with intercourse \par -rx given for pelvic sono\par -consider cauterizing cervix to stop the bleeding following sono results\par -advised using lubricant

## 2022-11-15 NOTE — END OF VISIT
[FreeTextEntry3] : I, Bekah Manjarrez, acted as a scribe on behalf of Dr. Germania Matos on 11/15/2022. \par \par All medical entries made by the scribe where at my, Dr. Germania Matos, direction and personally dictated by me on 11/15/2022. I have reviewed the chart and agree that the record accurately reflects my personal performance of the history, physical exam, assessment, and plan. I have also personally directed, reviewed and agreed with the chart.\par

## 2023-01-30 NOTE — ED PROVIDER NOTE - ADMIT DISPOSITION PRESENT ON ADMISSION SEPSIS
The ABCs of the Annual Wellness Visit  Subsequent Medicare Wellness Visit    Subjective    Dao Conteh is a 86 y.o. male who presents for a Subsequent Medicare Wellness Visit.    The following portions of the patient's history were reviewed and   updated as appropriate: allergies, current medications, past family history, past medical history, past social history, past surgical history and problem list.    Compared to one year ago, the patient feels his physical   health is the same.    Compared to one year ago, the patient feels his mental   health is the same.    Recent Hospitalizations:  He was not admitted to the hospital during the last year.       Current Medical Providers:  Patient Care Team:  Epley, James, APRN as PCP - General (Nurse Practitioner)  Tristan Quinones MD as Referring Physician (Sports Medicine)  Yenny Holman MD as Consulting Physician (Hematology and Oncology)  Vance Saldivar MD as Consulting Physician (Gastroenterology)  Bonnie Banerjee APRN as Nurse Practitioner (Gastroenterology)  Ronald Desir MD as Consulting Physician (Pulmonary Disease)    Outpatient Medications Prior to Visit   Medication Sig Dispense Refill   • amLODIPine (NORVASC) 10 MG tablet Take 1 tablet by mouth Daily. 90 tablet 3   • ascorbic acid (VITAMIN C) 500 MG tablet Every 12 (Twelve) Hours.     • Multiple Vitamins-Minerals (MULTIVITAMIN ADULT PO) Take 1 tablet by mouth Daily.     • Omega-3 Fatty Acids (FISH OIL) 1000 MG capsule capsule Take  by mouth Daily With Breakfast.     • pantoprazole (PROTONIX) 40 MG EC tablet Take 1 tablet by mouth Daily. 90 tablet 3   • simvastatin (ZOCOR) 40 MG tablet Take 1 tablet by mouth Every Night. 90 tablet 3   • aspirin 81 MG EC tablet Every 12 (Twelve) Hours.     • fluticasone (FLONASE) 50 MCG/ACT nasal spray fluticasone propionate 50 mcg/actuation nasal spray,suspension   USE 2 SPRAY(S) IN EACH NOSTRIL ONCE DAILY     • montelukast (SINGULAIR) 10  "MG tablet Take 10 mg by mouth every night at bedtime.       No facility-administered medications prior to visit.       No opioid medication identified on active medication list. I have reviewed chart for other potential  high risk medication/s and harmful drug interactions in the elderly.          Aspirin is on active medication list. Aspirin use is indicated based on review of current medical condition/s. Pros and cons of this therapy have been discussed today. Benefits of this medication outweigh potential harm.  Patient has been encouraged to continue taking this medication.  .      Patient Active Problem List   Diagnosis   • Colon polyp   • GERD (gastroesophageal reflux disease)   • HLD (hyperlipidemia)   • BP (high blood pressure)   • Aneurysm of abdominal aorta   • Tired   • Thrombocytopenia (HCC)   • Tubular adenoma of colon   • Abdominal aortic aneurysm (AAA) without rupture   • Low iron   • Dependent edema     Advance Care Planning  Advance Directive is not on file.  ACP discussion was held with the patient during this visit. Patient does not have an advance directive, information provided.     Objective    Vitals:    01/30/23 1335   BP: 149/87   Pulse: 70   Resp: 12   Temp: 97.4 °F (36.3 °C)   TempSrc: Infrared   SpO2: 97%   Weight: 82.1 kg (181 lb)   Height: 165.1 cm (65\")   PainSc: 0-No pain     Estimated body mass index is 30.12 kg/m² as calculated from the following:    Height as of this encounter: 165.1 cm (65\").    Weight as of this encounter: 82.1 kg (181 lb).    BMI is >= 30 and <35. (Class 1 Obesity). The following options were offered after discussion;: exercise counseling/recommendations      Does the patient have evidence of cognitive impairment? No    Lab Results   Component Value Date    CHLPL 132 01/25/2023    TRIG 90 01/25/2023    HDL 41 01/25/2023    LDL 74 01/25/2023    VLDL 17 01/25/2023    HGBA1C 5.70 (H) 01/25/2023        HEALTH RISK ASSESSMENT    Smoking Status:  Social History "     Tobacco Use   Smoking Status Never   Smokeless Tobacco Never     Alcohol Consumption:  Social History     Substance and Sexual Activity   Alcohol Use No     Fall Risk Screen:    STEADI Fall Risk Assessment was completed, and patient is at LOW risk for falls.Assessment completed on:1/30/2023    Depression Screening:  PHQ-2/PHQ-9 Depression Screening 4/15/2022   Little Interest or Pleasure in Doing Things 0-->not at all   Feeling Down, Depressed or Hopeless 0-->not at all   PHQ-9: Brief Depression Severity Measure Score 0       Health Habits and Functional and Cognitive Screening:  Functional & Cognitive Status 1/30/2023   Do you have difficulty preparing food and eating? No   Do you have difficulty bathing yourself, getting dressed or grooming yourself? No   Do you have difficulty using the toilet? No   Do you have difficulty moving around from place to place? No   Do you have trouble with steps or getting out of a bed or a chair? No   Current Diet Other   Dental Exam Unknown   Eye Exam Not up to date   Exercise (times per week) 2 times per week   Current Exercises Include Stationary Bicycling/Spin Class;Other   Current Exercise Activities Include -   Do you need help using the phone?  No   Are you deaf or do you have serious difficulty hearing?  No   Do you need help with transportation? No   Do you need help shopping? No   Do you need help preparing meals?  No   Do you need help with housework?  No   Do you need help with laundry? No   Do you need help taking your medications? No   Do you need help managing money? No   Do you ever drive or ride in a car without wearing a seat belt? No   Have you felt unusual stress, anger or loneliness in the last month? No   Who do you live with? Spouse   If you need help, do you have trouble finding someone available to you? No   Have you been bothered in the last four weeks by sexual problems? No   Do you have difficulty concentrating, remembering or making decisions? No        Age-appropriate Screening Schedule:  Refer to the list below for future screening recommendations based on patient's age, sex and/or medical conditions. Orders for these recommended tests are listed in the plan section. The patient has been provided with a written plan.    Health Maintenance   Topic Date Due   • ZOSTER VACCINE (2 of 3) 02/26/2017   • LIPID PANEL  01/25/2024   • TDAP/TD VACCINES (2 - Td or Tdap) 04/07/2027   • INFLUENZA VACCINE  Completed                CMS Preventative Services Quick Reference  Risk Factors Identified During Encounter  Fall Risk-High or Moderate: Discussed Fall Prevention in the home  Immunizations Discussed/Encouraged: Influenza, Shingrix and COVID19  The above risks/problems have been discussed with the patient.  Pertinent information has been shared with the patient in the After Visit Summary.  An After Visit Summary and PPPS were made available to the patient.    Follow Up:   Next Medicare Wellness visit to be scheduled in 1 year.       Additional E&M Note during same encounter follows:  Patient has multiple medical problems which are significant and separately identifiable that require additional work above and beyond the Medicare Wellness Visit.      Chief Complaint  Medicare Wellness-subsequent    Subjective        Very pleasant gentleman here today follow-up mixed hyperlipidemia takes a statin appropriately  Hypertension primary controlled nicely at home he checks it frequently systolic generally runs around 130 or so or less than  A little high at today's been runaround dizzy but no chest pain or shortness of breath    History of a tubular adenoma of the colon,  But his last colonoscopy in 2018 was clear  He prefers not to go forward with any further colonoscopy due to the fact that his last was clear as well as the risk of a colonoscopy a perforation due to his age okay and benefit quite limited.  But we will pursue with a Hemoccult occult blood test as his insurance  "declines even the Cologuard was attempted today  Did offer referral he declines at this time PSA normal slight elevation last year  No new changes again we discussed risk and benefit we usually stop screening at age 75 he prefers no referral at this time due to risk of continued monitoring and risk of biopsy which include sepsis     Mild prediabetes as well as some mild renal insufficiency, does not abuse NSAIDs, no pain or hematuria.      Dao Conteh is also being seen today for essential hypertension, mixed hyperlipidemia, prediabetes on recent scan, prostate cancer screening done on his labs,  Medicare wellness,       Review of Systems   Constitutional: Negative.    HENT: Negative.    Respiratory: Negative.    Gastrointestinal: Negative.    Genitourinary: Negative.    Musculoskeletal: Negative.    Neurological: Negative.    Psychiatric/Behavioral: Negative.    All other systems reviewed and are negative.      Objective   Vital Signs:  /87   Pulse 70   Temp 97.4 °F (36.3 °C) (Infrared)   Resp 12   Ht 165.1 cm (65\")   Wt 82.1 kg (181 lb)   SpO2 97%   BMI 30.12 kg/m²     Physical Exam  Vitals reviewed.   Constitutional:       General: He is not in acute distress.     Appearance: Normal appearance. He is well-developed. He is not diaphoretic.   HENT:      Head: Normocephalic and atraumatic.      Nose: Nose normal.   Eyes:      Conjunctiva/sclera: Conjunctivae normal.      Pupils: Pupils are equal, round, and reactive to light.   Neck:      Vascular: No JVD.   Cardiovascular:      Rate and Rhythm: Normal rate and regular rhythm.      Heart sounds: Normal heart sounds. No murmur heard.  Pulmonary:      Effort: Pulmonary effort is normal. No respiratory distress.      Breath sounds: Normal breath sounds. No wheezing.   Abdominal:      General: Bowel sounds are normal. There is no distension.      Palpations: Abdomen is soft. There is no mass.      Tenderness: There is no abdominal tenderness. There " is no guarding.      Hernia: No hernia is present.   Musculoskeletal:         General: No tenderness.      Cervical back: Normal range of motion and neck supple. No rigidity.   Lymphadenopathy:      Cervical: No cervical adenopathy.   Skin:     General: Skin is warm and dry.      Capillary Refill: Capillary refill takes less than 2 seconds.   Neurological:      Mental Status: He is alert and oriented to person, place, and time.   Psychiatric:         Mood and Affect: Mood normal.         Behavior: Behavior normal.         Thought Content: Thought content normal.         Judgment: Judgment normal.                         Assessment and Plan   Diagnoses and all orders for this visit:    1. Medicare annual wellness visit, subsequent (Primary)    2. Pure hypercholesterolemia  -     simvastatin (ZOCOR) 40 MG tablet; Take 1 tablet by mouth Every Night.  Dispense: 90 tablet; Refill: 3  -     CBC & Differential; Future  -     Comprehensive Metabolic Panel; Future  -     Lipid Panel With LDL / HDL Ratio; Future  -     Hemoglobin A1c; Future  -     TSH Rfx On Abnormal To Free T4; Future  -     Urinalysis With Microscopic If Indicated (No Culture) - Urine, Clean Catch; Future    3. Screen for colon cancer  -     Occult Blood, Fecal By Immunoassay - Stool, Per Rectum  -     CBC & Differential; Future  -     Comprehensive Metabolic Panel; Future  -     Lipid Panel With LDL / HDL Ratio; Future  -     Hemoglobin A1c; Future  -     TSH Rfx On Abnormal To Free T4; Future  -     Urinalysis With Microscopic If Indicated (No Culture) - Urine, Clean Catch; Future    4. Hypertension, unspecified type  -     CBC & Differential; Future  -     Comprehensive Metabolic Panel; Future  -     Lipid Panel With LDL / HDL Ratio; Future  -     Hemoglobin A1c; Future  -     TSH Rfx On Abnormal To Free T4; Future  -     Urinalysis With Microscopic If Indicated (No Culture) - Urine, Clean Catch; Future    5. Abdominal aortic aneurysm (AAA), unspecified  part, unspecified whether ruptured  -     Ambulatory Referral to Vascular Surgery  -     CBC & Differential; Future  -     Comprehensive Metabolic Panel; Future  -     Lipid Panel With LDL / HDL Ratio; Future  -     Hemoglobin A1c; Future  -     TSH Rfx On Abnormal To Free T4; Future  -     Urinalysis With Microscopic If Indicated (No Culture) - Urine, Clean Catch; Future    6. Prediabetes  -     CBC & Differential; Future  -     Comprehensive Metabolic Panel; Future  -     Lipid Panel With LDL / HDL Ratio; Future  -     Hemoglobin A1c; Future  -     TSH Rfx On Abnormal To Free T4; Future  -     Urinalysis With Microscopic If Indicated (No Culture) - Urine, Clean Catch; Future             Follow Up   Return in about 6 months (around 7/30/2023), or lab tod.  Patient was given instructions and counseling regarding his condition or for health maintenance advice. Please see specific information pulled into the AVS if appropriate.         Discussed PSA colonoscopy patient does not wish colonoscopy or PSA offered both  There are times we continue these but he has no prolonged elevated PSA and has no worrisome polyps he was following but he did have tubular adenoma but his last was clear no strong family history no personal history of colon cancer    Hemoccult screening important,  Little bit of constipation recently but no abdominal pain no coughing emesis black tarry stools or other issues no change in caliber of stool        Discharge instructions  Continue present plan plenty of fluids  Healthy diet decrease breads pastas and sweets as you has some mild prediabetes  No need to be perfect  Some modest weight loss over the next year such as 5 or 10 pounds my slow steady  For improved mobility and improved breathing and to maintain a healthy feeling and healthy mind-body    Check blood pressure weekly should be less than 130/80 greater than 110/70  64 ounces of water daily to protect kidneys    Avoid ibuprofen naproxen  occasional Tylenol is okay    I would like to see you every 6 months I need to check your kidney function to make sure your kidneys outlive you    MiraLAX or Colace over-the-counter as needed for stool softener down the road you can gradually add some name brand Metamucil with large glass of water before dinner  But acutely constipation start with a stool softener    Couple weeks go ahead and get the occult blood test just want to make sure you have no constipation so we avoid a false positive    We can do this yearly for the next couple years    At this time no need for the colonoscopy you for further evaluation of PSAs as we discussed if you change your mind he wishes referral I will be happy to do so to either gastroenterology or urology    Vascular surgery just to make sure you are getting the best test for you regarding your aneurysm since you are doing so well with 86  Follow-up in 6 months sooner if any difficulties should you get a febrile illness suggestive of COVID or flu check immediately urgent care so we can offer you the antivirals    Even with your borderline kidney you still can take the antiviral we would merely decrease the dose but hopefully you will not need this information  But avoid dehydration what ever the cause and follow-up in 6 months    Continue statin no change   No

## 2023-03-06 ENCOUNTER — APPOINTMENT (OUTPATIENT)
Dept: OBGYN | Facility: CLINIC | Age: 38
End: 2023-03-06
Payer: COMMERCIAL

## 2023-03-06 ENCOUNTER — LABORATORY RESULT (OUTPATIENT)
Age: 38
End: 2023-03-06

## 2023-03-06 VITALS
HEIGHT: 59 IN | BODY MASS INDEX: 26.81 KG/M2 | OXYGEN SATURATION: 99 % | HEART RATE: 71 BPM | WEIGHT: 133 LBS | RESPIRATION RATE: 16 BRPM | SYSTOLIC BLOOD PRESSURE: 123 MMHG | DIASTOLIC BLOOD PRESSURE: 79 MMHG

## 2023-03-06 DIAGNOSIS — Z87.898 PERSONAL HISTORY OF OTHER SPECIFIED CONDITIONS: ICD-10-CM

## 2023-03-06 DIAGNOSIS — Z87.42 PERSONAL HISTORY OF OTHER DISEASES OF THE FEMALE GENITAL TRACT: ICD-10-CM

## 2023-03-06 DIAGNOSIS — Z01.419 ENCOUNTER FOR GYNECOLOGICAL EXAMINATION (GENERAL) (ROUTINE) W/OUT ABNORMAL FINDINGS: ICD-10-CM

## 2023-03-06 PROCEDURE — 99395 PREV VISIT EST AGE 18-39: CPT

## 2023-03-06 NOTE — HISTORY OF PRESENT ILLNESS
[FreeTextEntry1] : 36yo  presents for routine gyn exam.  No complaints. Normal cycle/menses. Using condoms.\par \par \par <<<<<last exam urgent 2022 with MG for dyspareunia with normal findings including normal pelvic sono\par <<<<<AV with VK on 2022\par \par Info. from prior EMR:\par Demographics: Race: White Ethnicity: Not  or  Native Language: English Occupation: Teacher\par : 3 Para: 2 1 1 3 \par OB History: 1-sab\par 2- 20 weeks loss -[facility]\par 3.csection term-shirodkar\par 4. csection-27 weeks -[facility]\par 5. csection-term-shirodkar\par \par \par GYN History: No significant GYN history. Sexually Active\par PMH\par hypothyroid-synthroid\par pulm embolism-post partum\par \par Accepts blood products\par Surgical History: csections, cerclages\par Allergies: PCN, ASPIRIN\par Current Medications: Prescribed/Suppliments/OTC Synthroid 150mcg, IRON, VITMAIN C, MULTIVITAMIN, PNV, COLACE, Eloquis 10mg 2x daily for 1 week / 5mg 2x daily after week 1, Sucralfate\par Medications Verified Medications Verified\par Last PAP: 2019 -- hpv neg/pap neg Last OB Sono: 2020\par Family Hx\par No significant family history\par \par Personal history of blood clots/DVT/PE: no\par Personal history of conditions causing increased risk of blood clots/DVT/PE: no\par Family history of blood clots/DVT/PE: no\par Family history of conditions causing increased risk of blood clots/DVT/PE: no\par \par Social History\par Patient nonsmoker and has no familial exposure to second hand smoke\par Smoker Status: Never smoker Advance Directives Discussed [PapSmeardate] : 1/2022 [TextBox_31] : wnl

## 2023-03-06 NOTE — PLAN
[FreeTextEntry1] : HCM\par -SBE\par -pap & HPV today\par -MVI, Calcium, Vit d\par -f/u PCP for recommended HCM, vaccinations and CA screening\par -Weight/exercise\par RTO 1 year\par \par

## 2023-03-11 ENCOUNTER — NON-APPOINTMENT (OUTPATIENT)
Age: 38
End: 2023-03-11

## 2023-03-11 LAB
CYTOLOGY CVX/VAG DOC THIN PREP: ABNORMAL
HPV HIGH+LOW RISK DNA PNL CVX: DETECTED

## 2023-03-13 ENCOUNTER — NON-APPOINTMENT (OUTPATIENT)
Age: 38
End: 2023-03-13

## 2023-03-21 NOTE — ED ADULT TRIAGE NOTE - SPO2 (%)
Fever cardiac event monitor placed per Dr. Isabel Cameron orders. Patient instructed on use of monitor verbally as well as given Fever Patient Guide. Patient instructed that [facility] customer service is available 24/7 and that they are able to contact them at anytime to troubleshoot the monitor, obtain more supplies, and/or request sensitive patches. Patient will return monitor via [employer]. Holter Monitor  serial #: [id number]    Time to be monitored: 14 days    Discussed with prior authorizations team member Susanne Perez and verified insurance pre-authorization. 100

## 2023-03-27 ENCOUNTER — APPOINTMENT (OUTPATIENT)
Dept: PAIN MANAGEMENT | Facility: CLINIC | Age: 38
End: 2023-03-27

## 2023-05-04 NOTE — BEHAVIORAL HEALTH ASSESSMENT NOTE - DIFFERENTIAL
Pt encountered semifowler in bed in NAD, + O2 2 lpm via NC, reports she has been sneezing all day and allergies are bad. Augie MONROE administered Benadryl. Pt agreeable to PT. Pt reported she is supposed to go for CT with contrast of head today and has not eaten all day. 15:02-15:25. Pt encountered semifowler in bed in NAD, + O2 2 lpm via NC, reports she has been sneezing all day and allergies are bad. Augie MONROE administered Benadryl. Pt agreeable to PT. Pt reported she is supposed to go for CT with contrast of head today and has not eaten all day. adjustment disorder  r/o NINFA

## 2023-05-08 ENCOUNTER — TRANSCRIPTION ENCOUNTER (OUTPATIENT)
Age: 38
End: 2023-05-08

## 2023-06-30 ENCOUNTER — RX RENEWAL (OUTPATIENT)
Age: 38
End: 2023-06-30

## 2023-07-13 ENCOUNTER — APPOINTMENT (OUTPATIENT)
Dept: PAIN MANAGEMENT | Facility: CLINIC | Age: 38
End: 2023-07-13
Payer: COMMERCIAL

## 2023-07-13 VITALS
HEART RATE: 67 BPM | BODY MASS INDEX: 28.22 KG/M2 | HEIGHT: 59 IN | SYSTOLIC BLOOD PRESSURE: 126 MMHG | DIASTOLIC BLOOD PRESSURE: 78 MMHG | WEIGHT: 140 LBS

## 2023-07-13 PROCEDURE — 99213 OFFICE O/P EST LOW 20 MIN: CPT

## 2023-07-13 RX ORDER — NITROFURANTOIN (MONOHYDRATE/MACROCRYSTALS) 25; 75 MG/1; MG/1
100 CAPSULE ORAL TWICE DAILY
Qty: 10 | Refills: 0 | Status: DISCONTINUED | COMMUNITY
Start: 2022-01-28 | End: 2023-07-13

## 2023-07-13 RX ORDER — NARATRIPTAN 2.5 MG/1
2.5 TABLET, FILM COATED ORAL
Qty: 16 | Refills: 3 | Status: DISCONTINUED | COMMUNITY
Start: 2017-08-16 | End: 2023-07-13

## 2023-07-13 RX ORDER — NORTRIPTYLINE HYDROCHLORIDE 25 MG/1
25 CAPSULE ORAL
Qty: 60 | Refills: 3 | Status: DISCONTINUED | COMMUNITY
Start: 2017-08-10 | End: 2023-07-13

## 2023-07-13 RX ORDER — ONDANSETRON 4 MG/1
4 TABLET ORAL
Qty: 15 | Refills: 0 | Status: DISCONTINUED | COMMUNITY
Start: 2022-02-08 | End: 2023-07-13

## 2023-07-13 RX ORDER — AZITHROMYCIN 250 MG/1
250 TABLET, FILM COATED ORAL
Qty: 1 | Refills: 0 | Status: DISCONTINUED | COMMUNITY
Start: 2021-12-20 | End: 2023-07-13

## 2023-07-13 NOTE — HISTORY OF PRESENT ILLNESS
[Headache] : headache [Aura] : aura [Dizziness] : dizziness [Nausea] : nausea [Photophobia] : photophobia [Phonophobia] : phonophobia [Numbness] : numbness [Tingling] : tingling [improved] : improved [FreeTextEntry1] : Nurtec has been very helpful to prevent migraine . Denies any adverse effects of Nurtec. \par Frequency has decreased to an average of 1x/ month .\par Denies any new symptoms. \par Overall health has been good.  [Neck Pain] : no neck pain [Weakness] : no weakness

## 2023-07-13 NOTE — REVIEW OF SYSTEMS
[Fever] : no fever [Chills] : no chills [Chest Pain] : no chest pain [Palpitations] : no palpitations [Anxiety] : anxiety

## 2023-07-13 NOTE — PHYSICAL EXAM
[General Appearance - Alert] : alert [General Appearance - In No Acute Distress] : in no acute distress [General Appearance - Well Nourished] : well nourished [General Appearance - Well Developed] : well developed [General Appearance - Well-Appearing] : healthy appearing [Oriented To Time, Place, And Person] : oriented to person, place, and time [Affect] : the affect was normal [Mood] : the mood was normal [Memory Recent] : recent memory was not impaired [Memory Remote] : remote memory was not impaired [Cranial Nerves Facial (VII)] : face symmetrical [Cranial Nerves Accessory (XI - Cranial And Spinal)] : head turning and shoulder shrug symmetric [Cranial Nerves Hypoglossal (XII)] : there was no tongue deviation with protrusion [Motor Strength] : muscle strength was normal in all four extremities [Involuntary Movements] : no involuntary movements were seen [Paresis Pronator Drift Right-Sided] : no pronator drift on the right [Paresis Pronator Drift Left-Sided] : no pronator drift on the left [Motor Strength Upper Extremities Bilaterally] : strength was normal in both upper extremities [Motor Strength Lower Extremities Bilaterally] : strength was normal in both lower extremities [Coordination - Dysmetria Impaired Finger-to-Nose Bilateral] : not present [Sclera] : the sclera and conjunctiva were normal [PERRL With Normal Accommodation] : pupils were equal in size, round, reactive to light, with normal accommodation [Extraocular Movements] : extraocular movements were intact [] : no respiratory distress [Edema] : there was no peripheral edema [Abnormal Walk] : normal gait

## 2023-10-24 ENCOUNTER — NON-APPOINTMENT (OUTPATIENT)
Age: 38
End: 2023-10-24

## 2023-10-30 ENCOUNTER — TRANSCRIPTION ENCOUNTER (OUTPATIENT)
Age: 38
End: 2023-10-30

## 2023-11-07 ENCOUNTER — NON-APPOINTMENT (OUTPATIENT)
Age: 38
End: 2023-11-07

## 2023-11-19 NOTE — ED ADULT NURSE NOTE - NS ED NOTE ABUSE SUSPICION NEGLECT YN
Anesthesia Post-op Note    Patient: Chris Mercer  Procedure(s) Performed: ROBOTIC LOW ANTERIOR RESECTION SPLENIC FLEXURE MOBILIZATION, OMENTAL PEDICLE FLAP, FLEXIBLE SIGMOIDOSCOPY, INTRAOPERATIVE ANGIOGRAPHY WITH INDOCYANINE GREEN FLUORESCENCE WITH INTERPRETATION; ROBOTIC LOW ANTERIOR RESECTION SPLENIC FLEXURE MOBILIZATION, OMENTAL PEDICLE FLAP, FLEXIBLE SIGMOIDOSCOPY, INTRAOPERATIVE ANGIOGRAPHY WITH INDOCYANINE GREEN FLUORESCENCE WITH INTERPRETATION(PSB)  Anesthesia type: General    Vitals Value Taken Time   Temp 37 °C (98.6 °F) 11/16/23 1330   Pulse 71 11/16/23 1330   Resp 16 11/16/23 1330   SpO2 95 % 11/16/23 1330   /64 11/16/23 1330         Patient Location: PACU Phase 1  Post-op Vital Signs:stable  Level of Consciousness: awake and alert  Respiratory Status: spontaneous ventilation  Cardiovascular stable  Hydration: euvolemic  Pain Management: adequately controlled  Handoff: Handoff to receiving clinician was performed and questions were answered  Vomiting: none  Nausea: None  Airway Patency:patent  Post-op Assessment: no complications and patient tolerated procedure well      No notable events documented.                     No

## 2023-12-04 ENCOUNTER — APPOINTMENT (OUTPATIENT)
Dept: INTERNAL MEDICINE | Facility: CLINIC | Age: 38
End: 2023-12-04
Payer: COMMERCIAL

## 2023-12-04 VITALS
OXYGEN SATURATION: 98 % | RESPIRATION RATE: 16 BRPM | HEIGHT: 59 IN | TEMPERATURE: 98 F | SYSTOLIC BLOOD PRESSURE: 114 MMHG | WEIGHT: 140 LBS | HEART RATE: 65 BPM | DIASTOLIC BLOOD PRESSURE: 76 MMHG | BODY MASS INDEX: 28.22 KG/M2

## 2023-12-04 DIAGNOSIS — Z00.00 ENCOUNTER FOR GENERAL ADULT MEDICAL EXAMINATION W/OUT ABNORMAL FINDINGS: ICD-10-CM

## 2023-12-04 DIAGNOSIS — F41.9 ANXIETY DISORDER, UNSPECIFIED: ICD-10-CM

## 2023-12-04 PROCEDURE — 99395 PREV VISIT EST AGE 18-39: CPT

## 2023-12-11 RX ORDER — LORAZEPAM 0.5 MG/1
0.5 TABLET ORAL
Qty: 15 | Refills: 0 | Status: ACTIVE | COMMUNITY
Start: 2022-02-03 | End: 1900-01-01

## 2023-12-13 ENCOUNTER — APPOINTMENT (OUTPATIENT)
Dept: INTERNAL MEDICINE | Facility: CLINIC | Age: 38
End: 2023-12-13
Payer: COMMERCIAL

## 2023-12-13 PROCEDURE — G0008: CPT

## 2023-12-13 PROCEDURE — 90686 IIV4 VACC NO PRSV 0.5 ML IM: CPT

## 2024-01-09 ENCOUNTER — APPOINTMENT (OUTPATIENT)
Dept: OBGYN | Facility: CLINIC | Age: 39
End: 2024-01-09
Payer: COMMERCIAL

## 2024-01-09 VITALS
DIASTOLIC BLOOD PRESSURE: 70 MMHG | SYSTOLIC BLOOD PRESSURE: 112 MMHG | HEIGHT: 60 IN | BODY MASS INDEX: 27.68 KG/M2 | WEIGHT: 141 LBS

## 2024-01-09 DIAGNOSIS — N88.2 STRICTURE AND STENOSIS OF CERVIX UTERI: ICD-10-CM

## 2024-01-09 DIAGNOSIS — Z30.09 ENCOUNTER FOR OTHER GENERAL COUNSELING AND ADVICE ON CONTRACEPTION: ICD-10-CM

## 2024-01-09 DIAGNOSIS — Z30.011 ENCOUNTER FOR INITIAL PRESCRIPTION OF CONTRACEPTIVE PILLS: ICD-10-CM

## 2024-01-09 PROCEDURE — 99213 OFFICE O/P EST LOW 20 MIN: CPT

## 2024-01-09 RX ORDER — MISOPROSTOL 200 UG/1
200 TABLET ORAL AT BEDTIME
Qty: 2 | Refills: 0 | Status: ACTIVE | COMMUNITY
Start: 2024-01-09 | End: 1900-01-01

## 2024-02-23 ENCOUNTER — APPOINTMENT (OUTPATIENT)
Dept: OBGYN | Facility: CLINIC | Age: 39
End: 2024-02-23

## 2024-03-06 ENCOUNTER — NON-APPOINTMENT (OUTPATIENT)
Age: 39
End: 2024-03-06

## 2024-05-25 NOTE — PATIENT PROFILE OB - PT NEEDS ASSIST
Pt ripped off IV, has been ripping off medical equipment nonstop, keeps trying to get out of bed when pt is not stable to stand at the moment. RN, ERT, public safety has tried multiple distraction techniques. Pt stated daughter took out his IV- this RN witnessed the pt rip out his own IV. RN contacted MD at this time for orders.    no

## 2024-06-03 RX ORDER — RIMEGEPANT SULFATE 75 MG/75MG
75 TABLET, ORALLY DISINTEGRATING ORAL DAILY
Qty: 16 | Refills: 0 | Status: ACTIVE | COMMUNITY
Start: 2021-04-26 | End: 1900-01-01

## 2024-06-27 ENCOUNTER — RX RENEWAL (OUTPATIENT)
Age: 39
End: 2024-06-27

## 2024-07-24 DIAGNOSIS — Z00.00 ENCOUNTER FOR GENERAL ADULT MEDICAL EXAMINATION W/OUT ABNORMAL FINDINGS: ICD-10-CM

## 2024-09-15 ENCOUNTER — EMERGENCY (EMERGENCY)
Facility: HOSPITAL | Age: 39
LOS: 1 days | Discharge: HOME | End: 2024-09-15
Attending: EMERGENCY MEDICINE | Admitting: EMERGENCY MEDICINE
Payer: COMMERCIAL

## 2024-09-15 VITALS
OXYGEN SATURATION: 100 % | DIASTOLIC BLOOD PRESSURE: 78 MMHG | TEMPERATURE: 99 F | HEART RATE: 78 BPM | RESPIRATION RATE: 15 BRPM | SYSTOLIC BLOOD PRESSURE: 137 MMHG

## 2024-09-15 VITALS
OXYGEN SATURATION: 99 % | WEIGHT: 145.95 LBS | TEMPERATURE: 98 F | DIASTOLIC BLOOD PRESSURE: 87 MMHG | HEART RATE: 89 BPM | SYSTOLIC BLOOD PRESSURE: 142 MMHG | RESPIRATION RATE: 16 BRPM

## 2024-09-15 DIAGNOSIS — Z98.891 HISTORY OF UTERINE SCAR FROM PREVIOUS SURGERY: Chronic | ICD-10-CM

## 2024-09-15 LAB
ALBUMIN SERPL ELPH-MCNC: 4.4 G/DL — SIGNIFICANT CHANGE UP (ref 3.3–5)
ALP SERPL-CCNC: 63 U/L — SIGNIFICANT CHANGE UP (ref 40–120)
ALT FLD-CCNC: 12 U/L — SIGNIFICANT CHANGE UP (ref 4–33)
ANION GAP SERPL CALC-SCNC: 12 MMOL/L — SIGNIFICANT CHANGE UP (ref 7–14)
APPEARANCE UR: CLEAR — SIGNIFICANT CHANGE UP
APTT BLD: 28.2 SEC — SIGNIFICANT CHANGE UP (ref 24.5–35.6)
AST SERPL-CCNC: 20 U/L — SIGNIFICANT CHANGE UP (ref 4–32)
BACTERIA # UR AUTO: ABNORMAL /HPF
BASOPHILS # BLD AUTO: 0.03 K/UL — SIGNIFICANT CHANGE UP (ref 0–0.2)
BASOPHILS NFR BLD AUTO: 0.5 % — SIGNIFICANT CHANGE UP (ref 0–2)
BILIRUB SERPL-MCNC: 0.2 MG/DL — SIGNIFICANT CHANGE UP (ref 0.2–1.2)
BILIRUB UR-MCNC: NEGATIVE — SIGNIFICANT CHANGE UP
BLOOD GAS VENOUS COMPREHENSIVE RESULT: SIGNIFICANT CHANGE UP
BUN SERPL-MCNC: 17 MG/DL — SIGNIFICANT CHANGE UP (ref 7–23)
CALCIUM SERPL-MCNC: 10 MG/DL — SIGNIFICANT CHANGE UP (ref 8.4–10.5)
CAST: 0 /LPF — SIGNIFICANT CHANGE UP (ref 0–4)
CHLORIDE SERPL-SCNC: 103 MMOL/L — SIGNIFICANT CHANGE UP (ref 98–107)
CO2 SERPL-SCNC: 22 MMOL/L — SIGNIFICANT CHANGE UP (ref 22–31)
COLOR SPEC: SIGNIFICANT CHANGE UP
CREAT SERPL-MCNC: 0.87 MG/DL — SIGNIFICANT CHANGE UP (ref 0.5–1.3)
DIFF PNL FLD: NEGATIVE — SIGNIFICANT CHANGE UP
EGFR: 87 ML/MIN/1.73M2 — SIGNIFICANT CHANGE UP
EOSINOPHIL # BLD AUTO: 0.1 K/UL — SIGNIFICANT CHANGE UP (ref 0–0.5)
EOSINOPHIL NFR BLD AUTO: 1.6 % — SIGNIFICANT CHANGE UP (ref 0–6)
FLUAV AG NPH QL: SIGNIFICANT CHANGE UP
FLUBV AG NPH QL: SIGNIFICANT CHANGE UP
GLUCOSE SERPL-MCNC: 93 MG/DL — SIGNIFICANT CHANGE UP (ref 70–99)
GLUCOSE UR QL: NEGATIVE MG/DL — SIGNIFICANT CHANGE UP
HCG SERPL-ACNC: <1 MIU/ML — SIGNIFICANT CHANGE UP
HCT VFR BLD CALC: 41.2 % — SIGNIFICANT CHANGE UP (ref 34.5–45)
HGB BLD-MCNC: 13.6 G/DL — SIGNIFICANT CHANGE UP (ref 11.5–15.5)
IANC: 3.96 K/UL — SIGNIFICANT CHANGE UP (ref 1.8–7.4)
IMM GRANULOCYTES NFR BLD AUTO: 0.2 % — SIGNIFICANT CHANGE UP (ref 0–0.9)
INR BLD: 0.94 RATIO — SIGNIFICANT CHANGE UP (ref 0.85–1.18)
KETONES UR-MCNC: NEGATIVE MG/DL — SIGNIFICANT CHANGE UP
LEUKOCYTE ESTERASE UR-ACNC: ABNORMAL
LYMPHOCYTES # BLD AUTO: 1.67 K/UL — SIGNIFICANT CHANGE UP (ref 1–3.3)
LYMPHOCYTES # BLD AUTO: 26.6 % — SIGNIFICANT CHANGE UP (ref 13–44)
MCHC RBC-ENTMCNC: 30 PG — SIGNIFICANT CHANGE UP (ref 27–34)
MCHC RBC-ENTMCNC: 33 GM/DL — SIGNIFICANT CHANGE UP (ref 32–36)
MCV RBC AUTO: 90.7 FL — SIGNIFICANT CHANGE UP (ref 80–100)
MONOCYTES # BLD AUTO: 0.5 K/UL — SIGNIFICANT CHANGE UP (ref 0–0.9)
MONOCYTES NFR BLD AUTO: 8 % — SIGNIFICANT CHANGE UP (ref 2–14)
NEUTROPHILS # BLD AUTO: 3.96 K/UL — SIGNIFICANT CHANGE UP (ref 1.8–7.4)
NEUTROPHILS NFR BLD AUTO: 63.1 % — SIGNIFICANT CHANGE UP (ref 43–77)
NITRITE UR-MCNC: NEGATIVE — SIGNIFICANT CHANGE UP
NRBC # BLD: 0 /100 WBCS — SIGNIFICANT CHANGE UP (ref 0–0)
NRBC # FLD: 0 K/UL — SIGNIFICANT CHANGE UP (ref 0–0)
PH UR: 6 — SIGNIFICANT CHANGE UP (ref 5–8)
PLATELET # BLD AUTO: 242 K/UL — SIGNIFICANT CHANGE UP (ref 150–400)
POTASSIUM SERPL-MCNC: 4.7 MMOL/L — SIGNIFICANT CHANGE UP (ref 3.5–5.3)
POTASSIUM SERPL-SCNC: 4.7 MMOL/L — SIGNIFICANT CHANGE UP (ref 3.5–5.3)
PROT SERPL-MCNC: 7.5 G/DL — SIGNIFICANT CHANGE UP (ref 6–8.3)
PROT UR-MCNC: SIGNIFICANT CHANGE UP MG/DL
PROTHROM AB SERPL-ACNC: 10.5 SEC — SIGNIFICANT CHANGE UP (ref 9.5–13)
RBC # BLD: 4.54 M/UL — SIGNIFICANT CHANGE UP (ref 3.8–5.2)
RBC # FLD: 13.2 % — SIGNIFICANT CHANGE UP (ref 10.3–14.5)
RBC CASTS # UR COMP ASSIST: 1 /HPF — SIGNIFICANT CHANGE UP (ref 0–4)
REVIEW: SIGNIFICANT CHANGE UP
RSV RNA NPH QL NAA+NON-PROBE: SIGNIFICANT CHANGE UP
SARS-COV-2 RNA SPEC QL NAA+PROBE: SIGNIFICANT CHANGE UP
SODIUM SERPL-SCNC: 137 MMOL/L — SIGNIFICANT CHANGE UP (ref 135–145)
SP GR SPEC: 1.03 — SIGNIFICANT CHANGE UP (ref 1–1.03)
SQUAMOUS # UR AUTO: 18 /HPF — HIGH (ref 0–5)
TROPONIN T, HIGH SENSITIVITY RESULT: <6 NG/L — SIGNIFICANT CHANGE UP
TSH SERPL-MCNC: 1.93 UIU/ML — SIGNIFICANT CHANGE UP (ref 0.27–4.2)
UROBILINOGEN FLD QL: 1 MG/DL — SIGNIFICANT CHANGE UP (ref 0.2–1)
WBC # BLD: 6.27 K/UL — SIGNIFICANT CHANGE UP (ref 3.8–10.5)
WBC # FLD AUTO: 6.27 K/UL — SIGNIFICANT CHANGE UP (ref 3.8–10.5)
WBC UR QL: 2 /HPF — SIGNIFICANT CHANGE UP (ref 0–5)

## 2024-09-15 PROCEDURE — 99285 EMERGENCY DEPT VISIT HI MDM: CPT

## 2024-09-15 PROCEDURE — 93010 ELECTROCARDIOGRAM REPORT: CPT

## 2024-09-15 PROCEDURE — 71046 X-RAY EXAM CHEST 2 VIEWS: CPT | Mod: 26

## 2024-09-15 RX ORDER — ONDANSETRON 2 MG/ML
4 INJECTION, SOLUTION INTRAMUSCULAR; INTRAVENOUS ONCE
Refills: 0 | Status: COMPLETED | OUTPATIENT
Start: 2024-09-15 | End: 2024-09-15

## 2024-09-15 RX ADMIN — ONDANSETRON 4 MILLIGRAM(S): 2 INJECTION, SOLUTION INTRAMUSCULAR; INTRAVENOUS at 17:12

## 2024-09-15 NOTE — ED PROVIDER NOTE - CLINICAL SUMMARY MEDICAL DECISION MAKING FREE TEXT BOX
Zeferino: H/o hypothyroid and pregnancy-assoc. PE. P/w hot/cold spells and nausea and CP. Likely viral syndrome. Check trop, EKG, CXR, and RVP. Nausea meds.

## 2024-09-15 NOTE — ED PROVIDER NOTE - PATIENT PORTAL LINK FT
You can access the FollowMyHealth Patient Portal offered by [facility] by registering at the following website: [email or web address]. By joining [facility]’s FollowMyHealth portal, you will also be able to view your health information using other applications (apps) compatible with our system.

## 2024-09-15 NOTE — ED ADULT TRIAGE NOTE - CHIEF COMPLAINT QUOTE
pt c/o 3 days sob/ dizziness/ weakness with feeling cold/ chills 1 day. pt calm and social in triage, speaking full clear sentences.  past med hx PE 4 years ago, anxiety/ depression, hypothyroidism, ance on doxy

## 2024-09-15 NOTE — ED PROVIDER NOTE - QTC
How Many Skin Cancers Have You Had?: more than one What Is The Reason For Today's Visit?: History of Non-Melanoma Skin Cancer When Was Your Last Cancer Diagnosed?: 2018 408

## 2024-09-15 NOTE — ED PROVIDER NOTE - OBJECTIVE STATEMENT
38-year-old Female with a history of hypothyroidism, PE 4 years ago likely from pregnancy, no longer on Eliquis, who presents with shortness of breath for the past 3 days. She reports chest heaviness when lying flat. The patient also mentions associated fatigue, chills, dry cough, nausea. Denies abdominal pain, vomiting, or diarrhea. She states no exacerbating or alleviating factors. 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 Denies recent travels, prolonged immobilization, leg pain or swelling.

## 2024-09-15 NOTE — ED PROVIDER NOTE - WR ORDER STATUS 1
Performed Resulted O-Z Flap Text: The defect edges were debeveled with a #15 scalpel blade. Given the location of the defect, shape of the defect and the proximity to free margins an O-Z flap was deemed most appropriate. Using a sterile surgical marker, an appropriate transposition flap was drawn incorporating the defect and placing the expected incisions within the relaxed skin tension lines where possible. The area thus outlined was incised deep to adipose tissue with a #15 scalpel blade. The skin margins were undermined to an appropriate distance in all directions utilizing iris scissors. Following this, the designed flap was carried over into the primary defect and sutured into place.

## 2024-09-15 NOTE — ED PROVIDER NOTE - NSFOLLOWUPINSTRUCTIONS_ED_ALL_ED_FT
You were seen in our department for Shortness of Breath   Follow up with your Primary Care Doctor in 48-72 hours for further monitoring.  Follow up with Pulmonologist   within 1-2  week for further evaluation.  Follow up with Cardiologist within 1-2 weeks for further evaluation,    if you develop any chest pain, dizziness, high fevers, weakness, numbness, tingling, vision changes, or any worsening symptoms return to our ED for evaluation.

## 2024-09-15 NOTE — ED PROVIDER NOTE - PROGRESS NOTE DETAILS
Attending MD Ramsay.  Pt signed out to me in stable condition pending fluids, sxs control, screening labs, reassess, dispo, 39 yo fem nausea, pre syncopal, hypothyroidism hx, preg related PE, no continued AC, screening, hydrating. Attending MD Ramsay.  Pt very concerned re: PE 2/2 hx of PE.  PT previously provoked by pregnancy.  No unprovoked PE or DVT.  Pt prefers CTA to eval for PE.  Will obtain and likely dc with close outpt f/u if non-actionable. YOGI Fontaine: Received signout from YOGI Boyer.  Patient pending CT angio for DVT.  RN made me aware that patient states she would like to go home without CTA.  Patient vital signs stable, no history of unprovoked PE or DVT.  Patient is alert and oriented x 4 and capable to make medical decisions.  Discussed with patient follow-up for further management.  Labs and imaging discussed and reviewed with patient.  Patient hemodynamically stable, afebrile, non-hypoxic, tolerating p.o.  Strict return precautions given upon discharge.

## 2024-09-15 NOTE — ED PROVIDER NOTE - ATTENDING CONTRIBUTION TO CARE
I performed a face-to-face evaluation of the patient and performed a history and physical examination. I agree with the history and physical examination. If this was a PA visit, I personally saw the patient with the PA and performed a substantive portion of the visit including all aspects of the medical decision making.    H/o hypothyroid and pregnancy-assoc. PE. P/w hot/cold spells and nausea and CP. Likely viral syndrome. Check trop, EKG, CXR, and RVP. Nausea meds.

## 2024-09-15 NOTE — ED PROVIDER NOTE - PHYSICAL EXAMINATION
INITIAL VITAL SIGNS: Reviewed by me.  GENERAL: In no acute distress.  HEAD: Normocephalic. Atraumatic.  EYES: EOMI. PERRL.  ENT: Moist mucous membranes. Oropharynx is clear.   NECK: Supple. No meningismus.   CV: Regular rate and rhythm. No murmurs, rubs, or gallops.  RESPIRATORY: Unlabored respirations. Clear to ascultation bilaterally.  ABDOMEN: Soft, non-distended, non-tender. No guarding or rebound.  BACK: No CVA tenderness.  EXTREMITIES: No deformities. No edema. No unilateral swelling. No calf tenderness.   SKIN: Warm and dry. No rashes or petechiae.  NEURO: Grossly non-focal.

## 2024-09-16 LAB
CULTURE RESULTS: SIGNIFICANT CHANGE UP
SPECIMEN SOURCE: SIGNIFICANT CHANGE UP

## 2024-09-19 ENCOUNTER — APPOINTMENT (OUTPATIENT)
Dept: OBGYN | Facility: CLINIC | Age: 39
End: 2024-09-19
Payer: COMMERCIAL

## 2024-09-19 VITALS — SYSTOLIC BLOOD PRESSURE: 127 MMHG | HEART RATE: 67 BPM | DIASTOLIC BLOOD PRESSURE: 82 MMHG

## 2024-09-19 VITALS
HEIGHT: 60 IN | SYSTOLIC BLOOD PRESSURE: 129 MMHG | WEIGHT: 145 LBS | HEART RATE: 67 BPM | DIASTOLIC BLOOD PRESSURE: 87 MMHG | BODY MASS INDEX: 28.47 KG/M2

## 2024-09-19 PROCEDURE — 99395 PREV VISIT EST AGE 18-39: CPT

## 2024-09-19 NOTE — PHYSICAL EXAM
[Chaperone Present] : A chaperone was present in the examining room during all aspects of the physical examination [Appropriately responsive] : appropriately responsive [Alert] : alert [No Acute Distress] : no acute distress [No Lymphadenopathy] : no lymphadenopathy [Soft] : soft [Non-tender] : non-tender [Non-distended] : non-distended [No HSM] : No HSM [No Lesions] : no lesions [No Mass] : no mass [Oriented x3] : oriented x3 [Examination Of The Breasts] : a normal appearance [No Masses] : no breast masses were palpable [Labia Majora] : normal [Labia Minora] : normal [Normal] : normal [Uterine Adnexae] : normal [FreeTextEntry2] : Sonya

## 2024-09-20 LAB — HPV HIGH+LOW RISK DNA PNL CVX: DETECTED

## 2024-09-24 ENCOUNTER — TRANSCRIPTION ENCOUNTER (OUTPATIENT)
Age: 39
End: 2024-09-24

## 2024-09-25 ENCOUNTER — TRANSCRIPTION ENCOUNTER (OUTPATIENT)
Age: 39
End: 2024-09-25

## 2024-09-25 LAB — CYTOLOGY CVX/VAG DOC THIN PREP: ABNORMAL

## 2024-10-04 ENCOUNTER — TRANSCRIPTION ENCOUNTER (OUTPATIENT)
Age: 39
End: 2024-10-04

## 2024-10-18 ENCOUNTER — TRANSCRIPTION ENCOUNTER (OUTPATIENT)
Age: 39
End: 2024-10-18

## 2024-10-31 ENCOUNTER — APPOINTMENT (OUTPATIENT)
Dept: OBGYN | Facility: CLINIC | Age: 39
End: 2024-10-31
Payer: COMMERCIAL

## 2024-10-31 VITALS
BODY MASS INDEX: 28.47 KG/M2 | HEIGHT: 60 IN | SYSTOLIC BLOOD PRESSURE: 126 MMHG | DIASTOLIC BLOOD PRESSURE: 76 MMHG | WEIGHT: 145 LBS

## 2024-10-31 DIAGNOSIS — R87.618 OTHER ABNORMAL CYTOLOGICAL FINDINGS ON SPECIMENS FROM CERVIX UTERI: ICD-10-CM

## 2024-10-31 PROCEDURE — 57456 ENDOCERV CURETTAGE W/SCOPE: CPT

## 2024-11-05 ENCOUNTER — TRANSCRIPTION ENCOUNTER (OUTPATIENT)
Age: 39
End: 2024-11-05

## 2024-11-05 LAB — CORE LAB BIOPSY: NORMAL

## 2024-11-06 ENCOUNTER — TRANSCRIPTION ENCOUNTER (OUTPATIENT)
Age: 39
End: 2024-11-06

## 2024-11-25 NOTE — ED ADULT TRIAGE NOTE - SOURCE OF INFORMATION
Patient called and stated that at her last visit ( last Monday) the PCP was supposed to send   rizatriptan (MAXALT-MLT) 10 mg disintegrating tablet .  The pharmacy showed that this script was cancelled by her PCP but the PCP advised her that she did not cancel and would re send in a script.  As of today they do not have it.  Pls call if we can not honor this request.    Thank you   EMS

## 2024-12-17 ENCOUNTER — APPOINTMENT (OUTPATIENT)
Dept: INTERNAL MEDICINE | Facility: CLINIC | Age: 39
End: 2024-12-17
Payer: COMMERCIAL

## 2024-12-17 ENCOUNTER — LABORATORY RESULT (OUTPATIENT)
Age: 39
End: 2024-12-17

## 2024-12-17 VITALS
SYSTOLIC BLOOD PRESSURE: 112 MMHG | DIASTOLIC BLOOD PRESSURE: 72 MMHG | WEIGHT: 153 LBS | TEMPERATURE: 97.8 F | RESPIRATION RATE: 17 BRPM | HEART RATE: 61 BPM | OXYGEN SATURATION: 100 % | BODY MASS INDEX: 30.04 KG/M2 | HEIGHT: 60 IN

## 2024-12-17 DIAGNOSIS — Z00.00 ENCOUNTER FOR GENERAL ADULT MEDICAL EXAMINATION W/OUT ABNORMAL FINDINGS: ICD-10-CM

## 2024-12-17 DIAGNOSIS — E03.9 HYPOTHYROIDISM, UNSPECIFIED: ICD-10-CM

## 2024-12-17 DIAGNOSIS — F41.9 ANXIETY DISORDER, UNSPECIFIED: ICD-10-CM

## 2024-12-17 PROCEDURE — 90656 IIV3 VACC NO PRSV 0.5 ML IM: CPT

## 2024-12-17 PROCEDURE — G0008: CPT

## 2024-12-17 PROCEDURE — 99395 PREV VISIT EST AGE 18-39: CPT

## 2024-12-23 LAB
ALBUMIN SERPL ELPH-MCNC: 4.5 G/DL
ALP BLD-CCNC: 61 U/L
ALT SERPL-CCNC: 14 U/L
ANION GAP SERPL CALC-SCNC: 9 MMOL/L
APPEARANCE: CLEAR
AST SERPL-CCNC: 22 U/L
BILIRUB SERPL-MCNC: 0.4 MG/DL
BILIRUBIN URINE: NEGATIVE
BLOOD URINE: NEGATIVE
BUN SERPL-MCNC: 13 MG/DL
CALCIUM SERPL-MCNC: 9.9 MG/DL
CHLORIDE SERPL-SCNC: 106 MMOL/L
CHOLEST SERPL-MCNC: 198 MG/DL
CO2 SERPL-SCNC: 25 MMOL/L
COLOR: NORMAL
CREAT SERPL-MCNC: 0.74 MG/DL
EGFR: 105 ML/MIN/1.73M2
ESTIMATED AVERAGE GLUCOSE: 103 MG/DL
GLUCOSE QUALITATIVE U: NEGATIVE MG/DL
GLUCOSE SERPL-MCNC: 83 MG/DL
HBA1C MFR BLD HPLC: 5.2 %
HCT VFR BLD CALC: 38.6 %
HDLC SERPL-MCNC: 65 MG/DL
HGB BLD-MCNC: 12.3 G/DL
KETONES URINE: NEGATIVE MG/DL
LDLC SERPL CALC-MCNC: 121 MG/DL
LEUKOCYTE ESTERASE URINE: ABNORMAL
MCHC RBC-ENTMCNC: 30.1 PG
MCHC RBC-ENTMCNC: 31.9 G/DL
MCV RBC AUTO: 94.4 FL
NITRITE URINE: NEGATIVE
NONHDLC SERPL-MCNC: 133 MG/DL
PH URINE: 8
PLATELET # BLD AUTO: 300 K/UL
POTASSIUM SERPL-SCNC: 5.8 MMOL/L
PROT SERPL-MCNC: 7.1 G/DL
PROTEIN URINE: NEGATIVE MG/DL
RBC # BLD: 4.09 M/UL
RBC # FLD: 13.2 %
SODIUM SERPL-SCNC: 140 MMOL/L
SPECIFIC GRAVITY URINE: 1.01
T4 FREE SERPL-MCNC: 1.3 NG/DL
TRIGL SERPL-MCNC: 66 MG/DL
TSH SERPL-ACNC: 0.84 UIU/ML
UROBILINOGEN URINE: 0.2 MG/DL
WBC # FLD AUTO: 4.99 K/UL

## 2025-03-31 ENCOUNTER — TRANSCRIPTION ENCOUNTER (OUTPATIENT)
Age: 40
End: 2025-03-31

## 2025-04-01 ENCOUNTER — APPOINTMENT (OUTPATIENT)
Dept: PAIN MANAGEMENT | Facility: CLINIC | Age: 40
End: 2025-04-01
Payer: COMMERCIAL

## 2025-04-01 PROCEDURE — 99212 OFFICE O/P EST SF 10 MIN: CPT | Mod: 95

## 2025-04-02 ENCOUNTER — EMERGENCY (EMERGENCY)
Facility: HOSPITAL | Age: 40
LOS: 1 days | Discharge: HOME | End: 2025-04-02
Attending: EMERGENCY MEDICINE
Payer: COMMERCIAL

## 2025-04-02 ENCOUNTER — NON-APPOINTMENT (OUTPATIENT)
Age: 40
End: 2025-04-02

## 2025-04-02 VITALS
HEART RATE: 80 BPM | HEIGHT: 60 IN | WEIGHT: 156.09 LBS | DIASTOLIC BLOOD PRESSURE: 75 MMHG | TEMPERATURE: 99 F | OXYGEN SATURATION: 96 % | SYSTOLIC BLOOD PRESSURE: 116 MMHG | RESPIRATION RATE: 17 BRPM

## 2025-04-02 VITALS
SYSTOLIC BLOOD PRESSURE: 106 MMHG | OXYGEN SATURATION: 98 % | RESPIRATION RATE: 16 BRPM | TEMPERATURE: 98 F | DIASTOLIC BLOOD PRESSURE: 69 MMHG | HEART RATE: 68 BPM

## 2025-04-02 DIAGNOSIS — Z98.891 HISTORY OF UTERINE SCAR FROM PREVIOUS SURGERY: Chronic | ICD-10-CM

## 2025-04-02 LAB
ALBUMIN SERPL ELPH-MCNC: 4 G/DL — SIGNIFICANT CHANGE UP (ref 3.3–5)
ALP SERPL-CCNC: 54 U/L — SIGNIFICANT CHANGE UP (ref 40–120)
ALT FLD-CCNC: 18 U/L — SIGNIFICANT CHANGE UP (ref 10–45)
ANION GAP SERPL CALC-SCNC: 11 MMOL/L — SIGNIFICANT CHANGE UP (ref 5–17)
APPEARANCE UR: CLEAR — SIGNIFICANT CHANGE UP
APTT BLD: 28.6 SEC — SIGNIFICANT CHANGE UP (ref 24.5–35.6)
AST SERPL-CCNC: 24 U/L — SIGNIFICANT CHANGE UP (ref 10–40)
BACTERIA # UR AUTO: ABNORMAL /HPF
BASOPHILS # BLD AUTO: 0.03 K/UL — SIGNIFICANT CHANGE UP (ref 0–0.2)
BASOPHILS NFR BLD AUTO: 0.5 % — SIGNIFICANT CHANGE UP (ref 0–2)
BILIRUB SERPL-MCNC: 0.4 MG/DL — SIGNIFICANT CHANGE UP (ref 0.2–1.2)
BILIRUB UR-MCNC: NEGATIVE — SIGNIFICANT CHANGE UP
BUN SERPL-MCNC: 10 MG/DL — SIGNIFICANT CHANGE UP (ref 7–23)
CALCIUM SERPL-MCNC: 8.9 MG/DL — SIGNIFICANT CHANGE UP (ref 8.4–10.5)
CAST: 0 /LPF — SIGNIFICANT CHANGE UP (ref 0–4)
CHLORIDE SERPL-SCNC: 105 MMOL/L — SIGNIFICANT CHANGE UP (ref 96–108)
CO2 SERPL-SCNC: 23 MMOL/L — SIGNIFICANT CHANGE UP (ref 22–31)
COLOR SPEC: SIGNIFICANT CHANGE UP
CREAT SERPL-MCNC: 0.73 MG/DL — SIGNIFICANT CHANGE UP (ref 0.5–1.3)
DIFF PNL FLD: NEGATIVE — SIGNIFICANT CHANGE UP
EGFR: 107 ML/MIN/1.73M2 — SIGNIFICANT CHANGE UP
EGFR: 107 ML/MIN/1.73M2 — SIGNIFICANT CHANGE UP
EOSINOPHIL # BLD AUTO: 0.04 K/UL — SIGNIFICANT CHANGE UP (ref 0–0.5)
GAS PNL BLDV: SIGNIFICANT CHANGE UP
GLUCOSE SERPL-MCNC: 86 MG/DL — SIGNIFICANT CHANGE UP (ref 70–99)
HCG SERPL-ACNC: <2 MIU/ML — SIGNIFICANT CHANGE UP
HCG UR QL: NEGATIVE — SIGNIFICANT CHANGE UP
HCT VFR BLD CALC: 36.9 % — SIGNIFICANT CHANGE UP (ref 34.5–45)
HGB BLD-MCNC: 12 G/DL — SIGNIFICANT CHANGE UP (ref 11.5–15.5)
IMM GRANULOCYTES NFR BLD AUTO: 0.2 % — SIGNIFICANT CHANGE UP (ref 0–0.9)
INR BLD: 1.13 RATIO — SIGNIFICANT CHANGE UP (ref 0.85–1.16)
KETONES UR-MCNC: NEGATIVE MG/DL — SIGNIFICANT CHANGE UP
LEUKOCYTE ESTERASE UR-ACNC: ABNORMAL
LIDOCAIN IGE QN: 16 U/L — SIGNIFICANT CHANGE UP (ref 7–60)
LYMPHOCYTES # BLD AUTO: 1.04 K/UL — SIGNIFICANT CHANGE UP (ref 1–3.3)
LYMPHOCYTES # BLD AUTO: 18.7 % — SIGNIFICANT CHANGE UP (ref 13–44)
MCHC RBC-ENTMCNC: 30.1 PG — SIGNIFICANT CHANGE UP (ref 27–34)
MCHC RBC-ENTMCNC: 32.5 G/DL — SIGNIFICANT CHANGE UP (ref 32–36)
MCV RBC AUTO: 92.5 FL — SIGNIFICANT CHANGE UP (ref 80–100)
MONOCYTES # BLD AUTO: 0.47 K/UL — SIGNIFICANT CHANGE UP (ref 0–0.9)
MONOCYTES NFR BLD AUTO: 8.5 % — SIGNIFICANT CHANGE UP (ref 2–14)
NEUTROPHILS # BLD AUTO: 3.96 K/UL — SIGNIFICANT CHANGE UP (ref 1.8–7.4)
NEUTROPHILS NFR BLD AUTO: 71.4 % — SIGNIFICANT CHANGE UP (ref 43–77)
NITRITE UR-MCNC: NEGATIVE — SIGNIFICANT CHANGE UP
NRBC BLD AUTO-RTO: 0 /100 WBCS — SIGNIFICANT CHANGE UP (ref 0–0)
PH UR: >=9 (ref 5–8)
PLATELET # BLD AUTO: 203 K/UL — SIGNIFICANT CHANGE UP (ref 150–400)
POTASSIUM SERPL-MCNC: 3.9 MMOL/L — SIGNIFICANT CHANGE UP (ref 3.5–5.3)
POTASSIUM SERPL-SCNC: 3.9 MMOL/L — SIGNIFICANT CHANGE UP (ref 3.5–5.3)
PROT SERPL-MCNC: 6.7 G/DL — SIGNIFICANT CHANGE UP (ref 6–8.3)
PROT UR-MCNC: NEGATIVE MG/DL — SIGNIFICANT CHANGE UP
PROTHROM AB SERPL-ACNC: 12.9 SEC — SIGNIFICANT CHANGE UP (ref 9.9–13.4)
RBC # BLD: 3.99 M/UL — SIGNIFICANT CHANGE UP (ref 3.8–5.2)
RBC # FLD: 13.2 % — SIGNIFICANT CHANGE UP (ref 10.3–14.5)
RBC CASTS # UR COMP ASSIST: 1 /HPF — SIGNIFICANT CHANGE UP (ref 0–4)
SODIUM SERPL-SCNC: 139 MMOL/L — SIGNIFICANT CHANGE UP (ref 135–145)
SP GR SPEC: 1.02 — SIGNIFICANT CHANGE UP (ref 1–1.03)
UROBILINOGEN FLD QL: 0.2 MG/DL — SIGNIFICANT CHANGE UP (ref 0.2–1)
WBC # BLD: 5.55 K/UL — SIGNIFICANT CHANGE UP (ref 3.8–10.5)
WBC # FLD AUTO: 5.55 K/UL — SIGNIFICANT CHANGE UP (ref 3.8–10.5)
WBC UR QL: 0 /HPF — SIGNIFICANT CHANGE UP (ref 0–5)

## 2025-04-02 PROCEDURE — 93975 VASCULAR STUDY: CPT

## 2025-04-02 PROCEDURE — 83605 ASSAY OF LACTIC ACID: CPT

## 2025-04-02 PROCEDURE — 81513 NFCT DS BV RNA VAG FLU ALG: CPT

## 2025-04-02 PROCEDURE — 76830 TRANSVAGINAL US NON-OB: CPT | Mod: 26

## 2025-04-02 PROCEDURE — 74177 CT ABD & PELVIS W/CONTRAST: CPT | Mod: MC

## 2025-04-02 PROCEDURE — 82947 ASSAY GLUCOSE BLOOD QUANT: CPT

## 2025-04-02 PROCEDURE — 85025 COMPLETE CBC W/AUTO DIFF WBC: CPT

## 2025-04-02 PROCEDURE — 99285 EMERGENCY DEPT VISIT HI MDM: CPT | Mod: 25

## 2025-04-02 PROCEDURE — 87086 URINE CULTURE/COLONY COUNT: CPT

## 2025-04-02 PROCEDURE — 82330 ASSAY OF CALCIUM: CPT

## 2025-04-02 PROCEDURE — 99285 EMERGENCY DEPT VISIT HI MDM: CPT

## 2025-04-02 PROCEDURE — 84295 ASSAY OF SERUM SODIUM: CPT

## 2025-04-02 PROCEDURE — 87591 N.GONORRHOEAE DNA AMP PROB: CPT

## 2025-04-02 PROCEDURE — 85014 HEMATOCRIT: CPT

## 2025-04-02 PROCEDURE — 87661 TRICHOMONAS VAGINALIS AMPLIF: CPT

## 2025-04-02 PROCEDURE — 84702 CHORIONIC GONADOTROPIN TEST: CPT

## 2025-04-02 PROCEDURE — 87491 CHLMYD TRACH DNA AMP PROBE: CPT

## 2025-04-02 PROCEDURE — 85018 HEMOGLOBIN: CPT

## 2025-04-02 PROCEDURE — 85610 PROTHROMBIN TIME: CPT

## 2025-04-02 PROCEDURE — 96374 THER/PROPH/DIAG INJ IV PUSH: CPT | Mod: XU

## 2025-04-02 PROCEDURE — 76830 TRANSVAGINAL US NON-OB: CPT

## 2025-04-02 PROCEDURE — 81025 URINE PREGNANCY TEST: CPT

## 2025-04-02 PROCEDURE — 83690 ASSAY OF LIPASE: CPT

## 2025-04-02 PROCEDURE — 84132 ASSAY OF SERUM POTASSIUM: CPT

## 2025-04-02 PROCEDURE — 87481 CANDIDA DNA AMP PROBE: CPT

## 2025-04-02 PROCEDURE — 74177 CT ABD & PELVIS W/CONTRAST: CPT | Mod: 26

## 2025-04-02 PROCEDURE — 85730 THROMBOPLASTIN TIME PARTIAL: CPT

## 2025-04-02 PROCEDURE — 81001 URINALYSIS AUTO W/SCOPE: CPT

## 2025-04-02 PROCEDURE — 82435 ASSAY OF BLOOD CHLORIDE: CPT

## 2025-04-02 PROCEDURE — 82803 BLOOD GASES ANY COMBINATION: CPT

## 2025-04-02 PROCEDURE — 93975 VASCULAR STUDY: CPT | Mod: 26

## 2025-04-02 PROCEDURE — 80053 COMPREHEN METABOLIC PANEL: CPT

## 2025-04-02 RX ORDER — ACETAMINOPHEN 500 MG/5ML
1000 LIQUID (ML) ORAL ONCE
Refills: 0 | Status: COMPLETED | OUTPATIENT
Start: 2025-04-02 | End: 2025-04-02

## 2025-04-02 RX ORDER — SODIUM CHLORIDE 9 G/1000ML
1000 INJECTION, SOLUTION INTRAVENOUS ONCE
Refills: 0 | Status: COMPLETED | OUTPATIENT
Start: 2025-04-02 | End: 2025-04-02

## 2025-04-02 RX ADMIN — Medication 400 MILLIGRAM(S): at 10:58

## 2025-04-02 RX ADMIN — SODIUM CHLORIDE 1000 MILLILITER(S): 9 INJECTION, SOLUTION INTRAVENOUS at 11:32

## 2025-04-02 NOTE — ED ADULT NURSE NOTE - OBJECTIVE STATEMENT
38 y/o female A&Ox4 presents from urgent care BIBEMS c/o lower abdominal pain. PMH of PE (2022), hypothyroid, migraines, anxiety, HPV (negative biopsy a few months ago). allergies to PCN and aspirin. pt has multiple bouts of diarrhea since yesterday, Nausea and bloating. pt claims she took tylenol at midnight. not passing gas. denies blood in stool, fevers, urinary complaints.   at bedside, bed in lowest position, call bell within reach. 38 y/o female A&Ox4 presents from urgent care BIBEMS c/o lower abdominal pain. PMH of PE (2022), hypothyroid, migraines, anxiety, HPV (negative biopsy a few months ago). allergies to PCN and aspirin. pt has multiple bouts of diarrhea since yesterday, Nausea and bloating. pt claims she took tylenol at midnight. not passing gas. denies blood in stool, fevers, urinary complaints.   at bedside, bed in lowest position, call bell within reach, clear lungs to auscultation, tender to palpation along lower abd, reports no pain when laying at rest

## 2025-04-02 NOTE — ED ADULT NURSE NOTE - AVIAN FLU SYMPTOMS
No
PAST MEDICAL HISTORY:  Fx distal radius NEC-closed     GERD (gastroesophageal reflux disease)     Hyperlipidemia     Hypertension     IBS (irritable bowel syndrome)

## 2025-04-02 NOTE — ED PROVIDER NOTE - NSFOLLOWUPINSTRUCTIONS_ED_ALL_ED_FT
Please follow-up with your primary care doctor in the next 2-3 days for re-evaluation.    Rest, stay hydrated.    Recommend bland diet and advance diet as tolerated.     For pain you may take over-the-counter Tylenol as needed.    Return to the Emergency Department immediately if you develop any new/worsening symptoms including but not limited to worsening pain, inability eat/drink, fever (temperature greater than 100.3 °F), vomiting, worsening diarrhea, blood in your stools or any other concerns. Please follow-up with your primary care doctor in the next 2-3 days for re-evaluation. We are attempting to arrange follow up for you with a gastroenterologist for follow up. You will receive a call from a referral coordinator to discuss an appointment.     Rest, stay hydrated.    Recommend bland diet and advance diet as tolerated.     For pain you may take over-the-counter Tylenol as needed.    Return to the Emergency Department immediately if you develop any new/worsening symptoms including but not limited to worsening pain, inability eat/drink, fever (temperature greater than 100.3 °F), vomiting, worsening diarrhea, blood in your stools or any other concerns.

## 2025-04-02 NOTE — ED PROVIDER NOTE - PATIENT PORTAL LINK FT
You can access the FollowMyHealth Patient Portal offered by [facility] by registering at the following website: [email or web address]. By joining [employer]’s FollowMyHealth portal, you will also be able to view your health information using other applications (apps) compatible with our system.

## 2025-04-02 NOTE — ED PROVIDER NOTE - CERVIX
Cervix visualized, closed, no erythema. whitish discharge near cervical os and in vaginal vault. No cervical motion tenderness./DISCHARGE/non tender

## 2025-04-02 NOTE — ED ADULT NURSE NOTE - NSFALLUNIVINTERV_ED_ALL_ED
Bed/Stretcher in lowest position, wheels locked, appropriate side rails in place/Call bell, personal items and telephone in reach/Instruct patient to call for assistance before getting out of bed/chair/stretcher/Non-slip footwear applied when patient is off stretcher/[place] to call system/Physically safe environment - no spills, clutter or unnecessary equipment/Purposeful proactive rounding/Room/bathroom lighting operational, light cord in reach

## 2025-04-02 NOTE — ED PROVIDER NOTE - OBJECTIVE STATEMENT
38 yo female PMHx hypothyroidism, PE (pregnancy provoked ~4 years ago, no longer on AC) presents to the ED c/o abdominal pain and diarrhea for the past 2 days.  Reports has had a cramping abdominal pain in the past related to her ovulation, usually goes away after several hours, this pain felt different, LMP was 3/23. Pain felt in lower abdomen and she has had associated nonbloody looser stools since onset of pain as well. Pain has been constant since onset taken Tylenol with minimal relief. 38 yo female PMHx hypothyroidism, PE (pregnancy provoked ~4 years ago, no longer on AC) presents to the ED c/o abdominal pain and diarrhea for the past 2 days.  Reports has had a cramping abdominal pain in the past related to her ovulation, usually goes away after several hours, this pain felt different, LMP was 3/23. Pain felt in lower abdomen and she has had associated nonbloody looser stools since onset of pain as well. Pain has been constant since onset taken Tylenol with minimal relief. Mild associated nausea but no vomiting. +no appetite the last 2 days. Went to urgent care today and was sent to ED for eval. Denies chest pain, shortness of breath, fever/chills, recent travel, BRBPR, melena, urinary sxs. 40 yo female PMHx hypothyroidism, PE (pregnancy provoked ~4 years ago, no longer on AC) presents to the ED c/o abdominal pain and diarrhea for the past 2 days.  Reports has had a cramping abdominal pain in the past related to her ovulation, usually goes away after several hours, this pain felt different, LMP was 3/23. Pain felt in lower abdomen and she has had associated nonbloody looser stools since onset of pain as well. Pain has been constant since onset taken Tylenol with minimal relief. Mild associated nausea but no vomiting. +no appetite the last 2 days. Went to urgent care today and was sent to ED for eval. Denies chest pain, shortness of breath, fever/chills, recent travel, BRBPR, melena, urinary sxs, STI hx. Sexually active 1 partner. 40 yo female PMHx hypothyroidism, PE (pregnancy provoked ~4 years ago, no longer on AC) presents to the ED c/o abdominal pain and diarrhea for the past 2 days.  Reports has had a cramping abdominal pain in the past related to her ovulation, usually goes away after several hours, this pain feels similar but lasting longer, LMP was 3/23. Pain felt in lower abdomen and she has had associated nonbloody looser stools since onset of pain as well. Pain has been constant since onset taken Tylenol with minimal relief. Mild associated nausea but no vomiting. +no appetite the last 2 days. Went to urgent care today and was sent to ED for eval. Denies chest pain, shortness of breath, fever/chills, recent travel, BRBPR, melena, urinary sxs, STI hx. Sexually active one partner.

## 2025-04-02 NOTE — ED PROVIDER NOTE - PROGRESS NOTE DETAILS
Offered patient analgesia but she declines at this time.  Will reassess need. - Lakhwinder Benítez PA-C CT negative for acute pathology.  Appendix not well-visualized although there is no inflammation in the pericecal region. Patient still with lower abdominal pain from previous. Discussed with attending, though initial concern was low for pelvic pathology, given CT non-actionable will perform detailed pelvic exam and TVUS. - Lakhwinder Benítez PA-C Pelvic exam chaperoned by LEIF Edwards.  Slight whitish discharge noted near endocervical region, swab taken though patient denies any history of STI.  No CMT or adnexal tenderness on exam. - Lakhwinder Benítez PA-C CT negative for acute pathology.  Appendix not well-visualized although there is no inflammation in the pericecal region. No leukocytosis, no fever. Patient still with lower abdominal pain from previous. Discussed with attending, though initial concern was low for pelvic pathology, given CT non-actionable will perform pelvic exam and TVUS. - Lakhwinder Benítez PA-C Pelvic exam chaperoned by LEIF Edwards.  Slight whitish discharge noted near endocervical region, swab/sample obtained, though patient denies any history of STI and is not concerned for one. No CMT or adnexal tenderness on exam. ED attending aware, agrees low suspicion for STI/pelvic infection, will hold off on treatment. - Lakhwinder Benítez PA-C Pelvic sonogram unremarkable, did note small cyst with thin internal septation in right ovary but no evidence of ovarian torsion. patient made aware of all results. No obvious etiology of patient's symptoms. She has remained hemodynamically stable in the ED. still with pain in lower abdomen but controlled. Offered patient CDU for observation including serial abdominal exams and pain control however patient wishes to go home at this time. Feel this is reasonable given unremarkable labs and imaging. She was extensively counseled by me on strict return precautions including but not limited to worsening pain, localized pain in RLQ, fever, vomiting, inability eat/drink or any other concerns.  She was made aware to follow-up with her PMD within the next 2-3 days for re-evaluation.  Patient feels comfortable with this plan at this time and is stable for discharge.  ED attending aware.- Lakhwinder Benítez PA-C

## 2025-04-02 NOTE — ED PROVIDER NOTE - ABDOMINAL TENDER
+tenderness b/l lower abdominal quadrants, R>L. +ttp McBurney's point. No rebound/guarding/rigidity noted. No CVA ttp.

## 2025-04-02 NOTE — ED PROVIDER NOTE - CLINICAL SUMMARY MEDICAL DECISION MAKING FREE TEXT BOX
38 yo female PMHx hypothyroidism, PE (pregnancy provoked ~4 years ago, no longer on AC) presents to the ED c/o abdominal pain and diarrhea for the past 2 days. Patient slightly uncomfortable appearing. Abdomen soft but tenderness elicited in the right lower and left lower quadrant, R>L. +ttp over McBurney's point.  Given decreased appetite, RLQ tenderness and diarrhea concern for intra-abdominal process such as appendicitis, colitis or diverticulitis (Less likely ovarian pathology given associated GI symptoms).   Will obtain basic labs and CTAP to evaluate for above, analgesia and reassess.

## 2025-04-03 LAB
N GONORRHOEA RRNA SPEC QL NAA+PROBE: SIGNIFICANT CHANGE UP
SPECIMEN SOURCE: SIGNIFICANT CHANGE UP

## 2025-06-10 ENCOUNTER — APPOINTMENT (OUTPATIENT)
Dept: INTERNAL MEDICINE | Facility: CLINIC | Age: 40
End: 2025-06-10
Payer: COMMERCIAL

## 2025-06-10 PROCEDURE — G2211 COMPLEX E/M VISIT ADD ON: CPT | Mod: NC,93

## 2025-06-10 PROCEDURE — 99213 OFFICE O/P EST LOW 20 MIN: CPT | Mod: 93

## 2025-06-11 PROBLEM — F41.8 SITUATIONAL ANXIETY: Status: ACTIVE | Noted: 2025-06-11

## 2025-08-25 ENCOUNTER — APPOINTMENT (OUTPATIENT)
Dept: ORTHOPEDIC SURGERY | Facility: CLINIC | Age: 40
End: 2025-08-25

## 2025-08-26 ENCOUNTER — APPOINTMENT (OUTPATIENT)
Dept: ORTHOPEDIC SURGERY | Facility: CLINIC | Age: 40
End: 2025-08-26
Payer: COMMERCIAL

## 2025-08-26 VITALS — WEIGHT: 153 LBS | BODY MASS INDEX: 30.04 KG/M2 | HEIGHT: 60 IN

## 2025-08-26 DIAGNOSIS — M25.362 OTHER INSTABILITY, LEFT KNEE: ICD-10-CM

## 2025-08-26 DIAGNOSIS — S83.242A OTHER TEAR OF MEDIAL MENISCUS, CURRENT INJURY, LEFT KNEE, INITIAL ENCOUNTER: ICD-10-CM

## 2025-08-26 PROCEDURE — 73564 X-RAY EXAM KNEE 4 OR MORE: CPT | Mod: LT

## 2025-08-26 PROCEDURE — 99204 OFFICE O/P NEW MOD 45 MIN: CPT

## 2025-09-04 ENCOUNTER — APPOINTMENT (OUTPATIENT)
Dept: ORTHOPEDIC SURGERY | Facility: CLINIC | Age: 40
End: 2025-09-04

## 2025-09-04 VITALS — HEIGHT: 60 IN | WEIGHT: 153 LBS | BODY MASS INDEX: 30.04 KG/M2

## 2025-09-04 DIAGNOSIS — M76.892 OTHER SPECIFIED ENTHESOPATHIES OF LEFT LOWER LIMB, EXCLUDING FOOT: ICD-10-CM

## 2025-09-04 DIAGNOSIS — M23.92 UNSPECIFIED INTERNAL DERANGEMENT OF LEFT KNEE: ICD-10-CM

## 2025-09-08 PROBLEM — M76.892 TENDINITIS OF LEFT QUADRICEPS TENDON: Status: ACTIVE | Noted: 2025-09-08
